# Patient Record
Sex: MALE | Race: BLACK OR AFRICAN AMERICAN | Employment: OTHER | RURAL
[De-identification: names, ages, dates, MRNs, and addresses within clinical notes are randomized per-mention and may not be internally consistent; named-entity substitution may affect disease eponyms.]

---

## 2017-01-04 ENCOUNTER — TELEPHONE (OUTPATIENT)
Dept: CARDIOLOGY CLINIC | Age: 52
End: 2017-01-04

## 2017-01-04 NOTE — TELEPHONE ENCOUNTER
Pt advised (after 2 identifiers) per Dr Cristina Alvarenga \" Needs to go to ER for evaluation\". Pt acknowledged understanding and will go to the ER for evaluation.

## 2017-01-04 NOTE — TELEPHONE ENCOUNTER
Pt called w/cx of \"falling, stumbling, dizziness\" x past 3-4 days. Pt also states lying in bed - everything \"spinning\". Pt has hx of previous clot and heart attack in Jan.    Pt requesting to be \"put in hospital and having testing done\" to find out what is causing these sx's as \"nothing was found to the cause of heart attack in Jan\". Pt requesting c/b.

## 2018-01-30 ENCOUNTER — DOCUMENTATION ONLY (OUTPATIENT)
Dept: CARDIOLOGY CLINIC | Age: 53
End: 2018-01-30

## 2018-01-30 NOTE — PROGRESS NOTES
Per verbal request ( and 2 identifiers) last office note and ekg faxed to [facility]ins.     Ph   [phone]  Fax  [phone]

## 2018-02-20 ENCOUNTER — HOSPITAL ENCOUNTER (OUTPATIENT)
Dept: ULTRASOUND IMAGING | Age: 53
Discharge: HOME | End: 2018-02-20
Attending: ORTHOPAEDIC SURGERY
Payer: MEDICAID

## 2018-02-20 DIAGNOSIS — R20.0 HAND NUMBNESS: ICD-10-CM

## 2018-02-20 PROCEDURE — 76882 US LMTD JT/FCL EVL NVASC XTR: CPT

## 2018-03-06 ENCOUNTER — HOSPITAL ENCOUNTER (OUTPATIENT)
Dept: MRI IMAGING | Age: 53
Discharge: HOME | End: 2018-03-06
Attending: ORTHOPAEDIC SURGERY
Payer: MEDICAID

## 2018-03-06 DIAGNOSIS — R20.0 HAND NUMBNESS: ICD-10-CM

## 2018-03-06 PROCEDURE — 73218 MRI UPPER EXTREMITY W/O DYE: CPT

## 2018-10-18 ENCOUNTER — DOCUMENTATION ONLY (OUTPATIENT)
Dept: CARDIOLOGY CLINIC | Age: 53
End: 2018-10-18

## 2018-10-18 NOTE — PROGRESS NOTES
Per fax request (2 identifiers) last ofc note and ekg faxed to Dr Anitha Gamboa, [facility] and [address], ATTN: Janette.          [phone]  Fax  [phone]

## 2020-03-25 ENCOUNTER — PATIENT OUTREACH (OUTPATIENT)
Dept: FAMILY MEDICINE CLINIC | Age: 55
End: 2020-03-25

## 2020-03-25 NOTE — PROGRESS NOTES
COVID-19 Screening Initial Follow-up Note    Patient contacted regarding COVID-19  risk. Care Transition Nurse/ Ambulatory Care Manager contacted the patient by telephone to perform post discharge assessment. Verified name and  with patient as identifiers. Provided introduction to self, and explanation of the CTN/ACM role, and reason for call due to risk factors for infection and/or exposure to COVID-19. Symptoms reviewed with patient who verbalized the following symptoms: cough, shortness of breath, no new/worsening symptoms       Due to no new or worsening symptoms encounter was not routed to provider for escalation. Patient has following risk factors of: COPD. CTN/ACM reviewed discharge instructions, medical action plan and red flags such as increased shortness of breath, increasing fever and signs of decompensation with patient who verbalized understanding. Discussed exposure protocols and quarantine with CDC Guidelines What to do if you are sick with coronavirus disease 2019 Patient who was given an opportunity for questions and concerns. The patient agrees to contact the [facility] exposure line [phone], [facility] department  [facility]  [phone] and PCP office for questions related to their healthcare. CTN/ACM provided contact information for future reference.     Reviewed and educated patient on any new and changed medications related to discharge diagnosis     Plan for follow-up call in 14 days based on severity of symptoms and risk factors

## 2020-04-08 ENCOUNTER — PATIENT OUTREACH (OUTPATIENT)
Dept: FAMILY MEDICINE CLINIC | Age: 55
End: 2020-04-08

## 2020-04-08 NOTE — PROGRESS NOTES
Patient resolved from Transition of Care episode on 04/08/20  Patient/family has been provided the following resources and education related to COVID-19:                         Signs, symptoms and red flags related to COVID-19            CDC exposure and quarantine guidelines            [facility] exposure contact - [phone]            Contact for their local Department of Health                 Patient currently reports that the following symptoms have improved:  shortness of breath, no new/worsening symptoms     No further outreach scheduled with this CTN/ACM. Episode of Care resolved. Patient has this CTN/ACM contact information if future needs arise.

## 2020-06-16 PROBLEM — M17.0 TRICOMPARTMENT OSTEOARTHRITIS OF BOTH KNEES: Status: ACTIVE | Noted: 2018-11-01

## 2020-06-16 PROBLEM — G89.28 CHRONIC PAIN FOLLOWING SURGERY OR PROCEDURE: Status: ACTIVE | Noted: 2017-03-11

## 2020-07-07 ENCOUNTER — PATIENT OUTREACH (OUTPATIENT)
Dept: FAMILY MEDICINE CLINIC | Age: 55
End: 2020-07-07

## 2020-07-07 NOTE — PROGRESS NOTES
Patient contacted regarding recent discharge and COVID-19 risk. Discussed COVID-19 related testing which was not done at this time. Test results were not done. Patient informed of results, if available? n/a    Care Transition Nurse/ Ambulatory Care Manager contacted the patient by telephone to perform post discharge assessment. Verified name and  with patient as identifiers. Patient has following risk factors of:HTN, Asthma, COPD . CTN/ACM reviewed discharge instructions, medical action plan and red flags related to discharge diagnosis. Reviewed and educated them on any new and changed medications related to discharge diagnosis. Advised obtaining a 90-day supply of all daily and as-needed medications. Education provided regarding infection prevention, and signs and symptoms of COVID-19 and when to seek medical attention with patient who verbalized understanding. Discussed exposure protocols and quarantine from [address] you at higher risk for severe illness  and given an opportunity for questions and concerns. The patient agrees to contact the COVID-19 hotline [phone] or PCP office for questions related to their healthcare. CTN/ACM provided contact information for future reference. From CDC: Are you at higher risk for severe illness?  Wash your hands often.  Avoid close contact (6 feet, which is about two arm lengths) with people who are sick.  Put distance between yourself and other people if COVID-19 is spreading in your community.  Clean and disinfect frequently touched surfaces.  Avoid all cruise travel and non-essential air travel.  Call your healthcare professional if you have concerns about COVID-19 and your underlying condition or if you are sick.     For more information on steps you can take to protect yourself, see CDC's How to Protect Yourself      Patient/family/caregiver given information for Ros[facility] Malave and agrees to enroll yes  Patient's preferred e-mail:  [email or web address]  Patient's preferred phone number: [phone]  Based on Loop alert triggers, patient will be contacted by nurse care manager for worsening symptoms. Pt will be further monitored by COVID Loop Team based on severity of symptoms and risk factors. Patient will call for follow up today.

## 2021-04-26 ENCOUNTER — HOSPITAL ENCOUNTER (EMERGENCY)
Age: 56
Discharge: HOME | End: 2021-04-26
Attending: EMERGENCY MEDICINE
Payer: MEDICAID

## 2021-04-26 ENCOUNTER — APPOINTMENT (OUTPATIENT)
Dept: CT IMAGING | Age: 56
End: 2021-04-26
Attending: EMERGENCY MEDICINE
Payer: MEDICAID

## 2021-04-26 VITALS
TEMPERATURE: 98.3 F | BODY MASS INDEX: 24 KG/M2 | SYSTOLIC BLOOD PRESSURE: 138 MMHG | HEIGHT: 74 IN | RESPIRATION RATE: 16 BRPM | DIASTOLIC BLOOD PRESSURE: 86 MMHG | HEART RATE: 62 BPM | WEIGHT: 187 LBS | OXYGEN SATURATION: 95 %

## 2021-04-26 DIAGNOSIS — R10.31 ABDOMINAL PAIN, RIGHT LOWER QUADRANT: ICD-10-CM

## 2021-04-26 DIAGNOSIS — K59.00 CONSTIPATION, UNSPECIFIED CONSTIPATION TYPE: Primary | ICD-10-CM

## 2021-04-26 LAB
ALBUMIN SERPL-MCNC: 3.4 G/DL (ref 3.5–5)
ALBUMIN/GLOB SERPL: 1.1 {RATIO} (ref 1.1–2.2)
ALP SERPL-CCNC: 60 U/L (ref 45–117)
ALT SERPL-CCNC: 20 U/L (ref 12–78)
ANION GAP SERPL CALC-SCNC: 10 MMOL/L (ref 5–15)
AST SERPL-CCNC: 13 U/L (ref 15–37)
BASOPHILS # BLD: 0 K/UL (ref 0–0.1)
BASOPHILS NFR BLD: 1 % (ref 0–1)
BILIRUB SERPL-MCNC: 0.3 MG/DL (ref 0.2–1)
BUN SERPL-MCNC: 12 MG/DL (ref 6–20)
BUN/CREAT SERPL: 8 (ref 12–20)
CALCIUM SERPL-MCNC: 8.4 MG/DL (ref 8.5–10.1)
CHLORIDE SERPL-SCNC: 104 MMOL/L (ref 97–108)
CO2 SERPL-SCNC: 27 MMOL/L (ref 21–32)
COMMENT, HOLDF: NORMAL
CREAT SERPL-MCNC: 1.46 MG/DL (ref 0.7–1.3)
DIFFERENTIAL METHOD BLD: NORMAL
EOSINOPHIL # BLD: 0.2 K/UL (ref 0–0.4)
EOSINOPHIL NFR BLD: 3 % (ref 0–7)
ERYTHROCYTE [DISTWIDTH] IN BLOOD BY AUTOMATED COUNT: 11.8 % (ref 11.5–14.5)
GLOBULIN SER CALC-MCNC: 3 G/DL (ref 2–4)
GLUCOSE SERPL-MCNC: 95 MG/DL (ref 65–100)
HCT VFR BLD AUTO: 41.4 % (ref 36.6–50.3)
HGB BLD-MCNC: 14 G/DL (ref 12.1–17)
IMM GRANULOCYTES # BLD AUTO: 0 K/UL (ref 0–0.04)
IMM GRANULOCYTES NFR BLD AUTO: 0 % (ref 0–0.5)
INR PPP: 1 (ref 0.9–1.1)
LIPASE SERPL-CCNC: 137 U/L (ref 73–393)
LYMPHOCYTES # BLD: 2.1 K/UL (ref 0.8–3.5)
LYMPHOCYTES NFR BLD: 42 % (ref 12–49)
MAGNESIUM SERPL-MCNC: 1.8 MG/DL (ref 1.6–2.4)
MCH RBC QN AUTO: 32 PG (ref 26–34)
MCHC RBC AUTO-ENTMCNC: 33.8 G/DL (ref 30–36.5)
MCV RBC AUTO: 94.7 FL (ref 80–99)
MONOCYTES # BLD: 0.6 K/UL (ref 0–1)
MONOCYTES NFR BLD: 11 % (ref 5–13)
NEUTS SEG # BLD: 2.2 K/UL (ref 1.8–8)
NEUTS SEG NFR BLD: 43 % (ref 32–75)
NRBC # BLD: 0 K/UL (ref 0–0.01)
NRBC BLD-RTO: 0 PER 100 WBC
PLATELET # BLD AUTO: 220 K/UL (ref 150–400)
PMV BLD AUTO: 9.5 FL (ref 8.9–12.9)
POTASSIUM SERPL-SCNC: 4 MMOL/L (ref 3.5–5.1)
PROT SERPL-MCNC: 6.4 G/DL (ref 6.4–8.2)
PROTHROMBIN TIME: 10.2 SEC (ref 9–11.1)
RBC # BLD AUTO: 4.37 M/UL (ref 4.1–5.7)
SAMPLES BEING HELD,HOLD: NORMAL
SODIUM SERPL-SCNC: 141 MMOL/L (ref 136–145)
WBC # BLD AUTO: 5.1 K/UL (ref 4.1–11.1)

## 2021-04-26 PROCEDURE — 96376 TX/PRO/DX INJ SAME DRUG ADON: CPT

## 2021-04-26 PROCEDURE — 96374 THER/PROPH/DIAG INJ IV PUSH: CPT

## 2021-04-26 PROCEDURE — 96375 TX/PRO/DX INJ NEW DRUG ADDON: CPT

## 2021-04-26 PROCEDURE — 74011250637 HC RX REV CODE- 250/637: Performed by: EMERGENCY MEDICINE

## 2021-04-26 PROCEDURE — 74177 CT ABD & PELVIS W/CONTRAST: CPT

## 2021-04-26 PROCEDURE — 85025 COMPLETE CBC W/AUTO DIFF WBC: CPT

## 2021-04-26 PROCEDURE — 36415 COLL VENOUS BLD VENIPUNCTURE: CPT

## 2021-04-26 PROCEDURE — 83735 ASSAY OF MAGNESIUM: CPT

## 2021-04-26 PROCEDURE — 74011000636 HC RX REV CODE- 636: Performed by: EMERGENCY MEDICINE

## 2021-04-26 PROCEDURE — 83690 ASSAY OF LIPASE: CPT

## 2021-04-26 PROCEDURE — 85610 PROTHROMBIN TIME: CPT

## 2021-04-26 PROCEDURE — 99284 EMERGENCY DEPT VISIT MOD MDM: CPT

## 2021-04-26 PROCEDURE — 74011250636 HC RX REV CODE- 250/636: Performed by: EMERGENCY MEDICINE

## 2021-04-26 PROCEDURE — 80053 COMPREHEN METABOLIC PANEL: CPT

## 2021-04-26 RX ORDER — DICYCLOMINE HYDROCHLORIDE 20 MG/1
20 TABLET ORAL
Status: COMPLETED | OUTPATIENT
Start: 2021-04-26 | End: 2021-04-26

## 2021-04-26 RX ORDER — ONDANSETRON 4 MG/1
4 TABLET, ORALLY DISINTEGRATING ORAL
Qty: 6 TAB | Refills: 0 | Status: SHIPPED | OUTPATIENT
Start: 2021-04-26 | End: 2022-03-09

## 2021-04-26 RX ORDER — MORPHINE SULFATE 4 MG/ML
8 INJECTION INTRAVENOUS ONCE
Status: COMPLETED | OUTPATIENT
Start: 2021-04-26 | End: 2021-04-26

## 2021-04-26 RX ORDER — DICYCLOMINE HYDROCHLORIDE 10 MG/1
10 CAPSULE ORAL
Qty: 12 CAP | Refills: 0 | Status: SHIPPED | OUTPATIENT
Start: 2021-04-26 | End: 2022-01-28

## 2021-04-26 RX ORDER — SODIUM CHLORIDE 0.9 % (FLUSH) 0.9 %
5-10 SYRINGE (ML) INJECTION ONCE
Status: DISCONTINUED | OUTPATIENT
Start: 2021-04-26 | End: 2021-04-26 | Stop reason: HOSPADM

## 2021-04-26 RX ORDER — ONDANSETRON 2 MG/ML
8 INJECTION INTRAMUSCULAR; INTRAVENOUS ONCE
Status: COMPLETED | OUTPATIENT
Start: 2021-04-26 | End: 2021-04-26

## 2021-04-26 RX ORDER — POLYETHYLENE GLYCOL 3350 17 G/17G
17 POWDER, FOR SOLUTION ORAL DAILY
Qty: 235 G | Refills: 0 | Status: SHIPPED | OUTPATIENT
Start: 2021-04-26 | End: 2022-03-09

## 2021-04-26 RX ADMIN — MORPHINE SULFATE 8 MG: 4 INJECTION INTRAVENOUS at 16:11

## 2021-04-26 RX ADMIN — ONDANSETRON 8 MG: 2 INJECTION INTRAMUSCULAR; INTRAVENOUS at 14:40

## 2021-04-26 RX ADMIN — DICYCLOMINE HYDROCHLORIDE 20 MG: 20 TABLET ORAL at 16:12

## 2021-04-26 RX ADMIN — IOPAMIDOL 100 ML: 612 INJECTION, SOLUTION INTRAVENOUS at 15:05

## 2021-04-26 RX ADMIN — SODIUM CHLORIDE 1000 ML: 9 INJECTION, SOLUTION INTRAVENOUS at 14:35

## 2021-04-26 RX ADMIN — MORPHINE SULFATE 8 MG: 4 INJECTION INTRAVENOUS at 14:40

## 2021-04-26 NOTE — ED PROVIDER NOTES
EMERGENCY DEPARTMENT HISTORY AND PHYSICAL EXAM          Date: 4/26/2021  Patient Name: Dariana Grant    History of Presenting Illness     Chief Complaint   Patient presents with    Abdominal Pain       History Provided By: Patient    HPI: Dariana Grant is a 60 y.o. male, pmhx hypertension, COPD, CAD status post MI with stents, PTSD, chronic pain, who presents dilatory to the ED c/o abdominal pain    Patient explains that recently has been having pain for about a few months which is currently being evaluated by GI and colorectal specialists for diagnosis. He was diagnosed with diverticulitis in December. He states over the past few days his symptoms of worsening and his girlfriend decided to bring him to the ER today. Patient explains he has diffuse pain which seems worse on the right side and he notes he has been having bloody stools. He states the stools are dark in color with some bright blood on it and sometimes blood in the toilet with clots. He denies any fevers, chills, shortness of breath, vomiting as well as any chest pain with the symptoms. Pain is currently listed at a 9/10. PCP: Tricia White MD    Allergies: Penicillin  Social Hx: +tobacco, +EtOH, -Illicit Drugs; There are no other complaints, changes, or physical findings at this time. Current Outpatient Medications   Medication Sig Dispense Refill    dicyclomine (BENTYL) 10 mg capsule Take 1 Cap by mouth four (4) times daily as needed for Abdominal Cramps. 12 Cap 0    ondansetron (ZOFRAN ODT) 4 mg disintegrating tablet Take 1 Tab by mouth every eight (8) hours as needed for Nausea. 6 Tab 0    polyethylene glycol (Miralax) 17 gram/dose powder Take 17 g by mouth daily. 1 tablespoon with 8 oz of water daily 235 g 0    cyclobenzaprine (FLEXERIL) 10 mg tablet Take 1 Tab by mouth three (3) times daily as needed for Muscle Spasm(s).  21 Tab 0    predniSONE (DELTASONE) 10 mg tablet 5 tab p.o. daily x2 days then 4 tabs daily p.o. x2 days, then 3 tabs daily p.o. x2 days then 2 tabs daily p.o. x2 days then 1 tab daily for 3 days  Indications: worsening asthma 30 Tab 0    ipratropium (ATROVENT HFA) 17 mcg/actuation inhaler Take 1 Puff by inhalation three (3) times daily. 1 Inhaler 5    ipratropium (ATROVENT) 0.02 % soln 2.5 mL by Nebulization route every four (4) hours as needed. 30 Vial 0    benzonatate (TESSALON PERLES) 100 mg capsule Take 100 mg by mouth three (3) times daily as needed for Cough.  lansoprazole (PREVACID PO) Take 1 Each by mouth daily.  carvedilol (COREG) 6.25 mg tablet Take 1 Tab by mouth two (2) times daily (with meals). 60 Tab 0    clopidogrel (PLAVIX) 75 mg tab Take 1 Tab by mouth daily. 30 Tab 0    isosorbide mononitrate ER (IMDUR) 30 mg tablet Take 1 Tab by mouth daily. 30 Tab 0    budesonide-formoterol (SYMBICORT) 160-4.5 mcg/actuation HFA inhaler Take 2 Puffs by inhalation two (2) times a day. 1 Inhaler 5    albuterol (PROVENTIL HFA, VENTOLIN HFA, PROAIR HFA) 90 mcg/actuation inhaler Take 1 Puff by inhalation every six (6) hours as needed for Wheezing. 1 Inhaler 5    albuterol (PROVENTIL VENTOLIN) 2.5 mg /3 mL (0.083 %) nebulizer solution 3 mL by Nebulization route as needed. 2 Package 0    aspirin delayed-release 81 mg tablet Take 1 Tab by mouth daily. 30 Tab 0    atorvastatin (LIPITOR) 80 mg tablet Take 1 Tab by mouth daily. 30 Tab 0    lisinopril (PRINIVIL, ZESTRIL) 20 mg tablet Take 1 Tab by mouth daily. 30 Tab 0    lidocaine (LIDODERM) 5 % 3 Patches by TransDERmal route every twelve (12) hours. 90 Patch 0    traZODone (DESYREL) 150 mg tablet Take 2 Tabs by mouth nightly. 60 Tab 3    nitroglycerin (NITROSTAT) 0.4 mg SL tablet 1 Tab by SubLINGual route every five (5) minutes as needed for Chest Pain.  1 Bottle 2    Blood Pressure Monitor kit Use as directed 1 Kit 0       Past History     Past Medical History:  Past Medical History:   Diagnosis Date    Acute MI, anterior wall ([address] 1/9/16    VF arrest/lytics/[facility]    Asthma     Chronic obstructive pulmonary disease (HCC)     Chronic pain     Coronary heart disease     GERD (gastroesophageal reflux disease)     Hypertension     Pneumonia     PTSD (post-traumatic stress disorder)     Shingles        Past Surgical History:  Past Surgical History:   Procedure Laterality Date    HX KNEE ARTHROSCOPY Right     HX ORTHOPAEDIC      removed a mass from right palm oct 13 2017right wrist fxr repair 2016 dec    HX ROTATOR CUFF REPAIR         Family History:  Family History   Family history unknown: Yes       Social History:  Social History     Tobacco Use    Smoking status: Current Every Day Smoker     Packs/day: 0.10     Types: Cigarettes    Smokeless tobacco: Never Used   Substance Use Topics    Alcohol use: Yes     Alcohol/week: 1.0 standard drinks     Types: 1 Shots of liquor per week     Comment: social    Drug use: No       Allergies: Allergies   Allergen Reactions    Pcn [Penicillins] Unknown (comments)         Review of Systems   Review of Systems   Constitutional: Negative for activity change, appetite change, chills, fever and unexpected weight change. HENT: Negative for congestion. Eyes: Negative for pain and visual disturbance. Respiratory: Negative for cough and shortness of breath. Cardiovascular: Negative for chest pain. Gastrointestinal: Positive for abdominal pain, anal bleeding and blood in stool. Negative for diarrhea, nausea and vomiting. Genitourinary: Negative for dysuria. Musculoskeletal: Negative for back pain. Skin: Negative for rash. Neurological: Negative for headaches. Physical Exam   Physical Exam  Vitals signs and nursing note reviewed. Constitutional:       Appearance: He is well-developed. He is not diaphoretic. Comments: This is a thin middle-aged male who appears uncomfortable currently in moderate distress   HENT:      Head: Normocephalic and atraumatic.    Eyes: General:         Right eye: No discharge. Left eye: No discharge. Conjunctiva/sclera: Conjunctivae normal.      Pupils: Pupils are equal, round, and reactive to light. Neck:      Musculoskeletal: Normal range of motion and neck supple. Cardiovascular:      Rate and Rhythm: Normal rate and regular rhythm. Heart sounds: Normal heart sounds. No murmur. Pulmonary:      Effort: Pulmonary effort is normal. No respiratory distress. Breath sounds: Normal breath sounds. No wheezing or rales. Abdominal:      General: Abdomen is flat. Bowel sounds are normal. There is no distension. Palpations: Abdomen is soft. There is no fluid wave or hepatomegaly. Tenderness: There is abdominal tenderness in the right lower quadrant. There is guarding. Musculoskeletal: Normal range of motion. Skin:     General: Skin is warm and dry. Capillary Refill: Capillary refill takes less than 2 seconds. Findings: No rash. Neurological:      Mental Status: He is alert and oriented to person, place, and time. Cranial Nerves: No cranial nerve deficit. Motor: No abnormal muscle tone. Diagnostic Study Results     Labs -   No results found for this or any previous visit (from the past 12 hour(s)). Radiologic Studies -   CT ABD PELV W CONT   Final Result   1. Evidence of fatty infiltration involving the liver. 2. Relative enlargement of the prostate gland. CT Results  (Last 48 hours)               04/26/21 1507  CT ABD PELV W CONT Final result    Impression:  1. Evidence of fatty infiltration involving the liver. 2. Relative enlargement of the prostate gland. Narrative:  EXAM: CT ABD PELV W CONT       INDICATION: RLQ pain       COMPARISON: CT abdomen and pelvis dated 9/9/2018        CONTRAST: 100 mL of Isovue-300.        TECHNIQUE:    Following the uneventful intravenous administration of contrast, thin axial   images were obtained through the abdomen and pelvis. Coronal and sagittal   reconstructions were generated. Oral contrast was not administered. CT dose   reduction was achieved through use of a standardized protocol tailored for this   examination and automatic exposure control for dose modulation. FINDINGS:    Lung bases: Clear. Incidentally imaged heart and mediastinum: Unremarkable. Liver: Decreased attenuation compatible with fatty infiltration. No focal   hepatic lesions identified. No mass or biliary dilatation. Gallbladder: Unremarkable. Spleen: Within normal limits. Pancreas: No mass or ductal dilatation. Adrenals: Unremarkable. Kidneys: No mass or hydronephrosis. Stomach: Unremarkable. Small bowel: No dilatation or wall thickening. Colon: No dilatation or wall thickening. A moderately large amount of gas and   fecal material is noted within the colon. There is no evidence of intestinal   obstruction or free intraperitoneal air. Appendix: Normal in appearance (see axial image 68). Peritoneum: No ascites or pneumoperitoneum. Retroperitoneum: No lymphadenopathy or aortic aneurysm. Reproductive organs: Relative enlargement of the prostate gland. Urinary bladder: Suboptimal distention of the urinary bladder. Bones: No destructive bone lesion. Additional comments: N/A               CXR Results  (Last 48 hours)    None            Medical Decision Making   I am the first provider for this patient. I reviewed the vital signs, available nursing notes, past medical history, past surgical history, family history and social history. Vital Signs-Reviewed the patient's vital signs.   Patient Vitals for the past 24 hrs:   Temp Pulse Resp BP SpO2   04/26/21 1634  62 16 138/86 95 %   04/26/21 1605  62 18 (!) 137/92 98 %   04/26/21 1522  60 18 137/77 99 %   04/26/21 1437  71 20 110/77 99 %   04/26/21 1356 98.3 °F (36.8 °C) 76 18 118/87 95 %     Pulse Oximetry Analysis - 95% on RA    Records Reviewed: Nursing Notes, Old Medical Records, Previous Radiology Studies and Previous Laboratory Studies    Provider Notes (Medical Decision Making):   MDM: Middle-aged male presenting with known history of diverticulitis for rectal bleeding and abdominal pain. Exam concerning for guarding with right lower quadrant tenderness. However, his vitals are normal without any concern for sepsis or bacteremia. Will initiate symptom management with broad evaluation to rule out abdominal pathology. ED Course:   Initial assessment performed. The patients presenting problems have been discussed, and they are in agreement with the care plan formulated and outlined with them. I have encouraged them to ask questions as they arise throughout their visit. PROGRESS NOTE:  3:30 PM  Pt continues with pain. Second dose of medication to be provided. Labs reviewed and notable for appearing at baseline without any evidence of white blood cell elevation or increase in creatinine. Electrolytes appear normal.  Await CT scan results. 4 PM  Patient continues with pain but has yet to receive medication. Bentyl ordered on attempt to prevent cramping given the amount of constipation. Discussed with patient the importance of bowel regimen with him while awaiting for his colorectal appointment. Return precautions reviewed for GI bleeding/anemia or worsening abdominal pain. Discharge note:  Pt re-evaluated and noted to be feeling better, ready for discharge. Updated pt on all final lab and CT findings. Will follow up as instructed. All questions have been answered, pt voiced understanding and agreement with plan. Specific return precautions provided as well as instructions to return to the ED should sx worsen at any time. Vital signs stable for discharge. Critical Care Time:   0      Diagnosis     Clinical Impression:   1. Constipation, unspecified constipation type    2. Abdominal pain, right lower quadrant        PLAN:  1.    Discharge Medication List as of 4/26/2021  4:33 PM      START taking these medications    Details   dicyclomine (BENTYL) 10 mg capsule Take 1 Cap by mouth four (4) times daily as needed for Abdominal Cramps., Normal, Disp-12 Cap, R-0      ondansetron (ZOFRAN ODT) 4 mg disintegrating tablet Take 1 Tab by mouth every eight (8) hours as needed for Nausea., Normal, Disp-6 Tab, R-0      polyethylene glycol (Miralax) 17 gram/dose powder Take 17 g by mouth daily. 1 tablespoon with 8 oz of water daily, Normal, Disp-235 g, R-0         CONTINUE these medications which have NOT CHANGED    Details   cyclobenzaprine (FLEXERIL) 10 mg tablet Take 1 Tab by mouth three (3) times daily as needed for Muscle Spasm(s). , Normal, Disp-21 Tab, R-0      predniSONE (DELTASONE) 10 mg tablet 5 tab p.o. daily x2 days then 4 tabs daily p.o. x2 days, then 3 tabs daily p.o. x2 days then 2 tabs daily p.o. x2 days then 1 tab daily for 3 days  Indications: worsening asthma, Print, Disp-30 Tab, R-0      ipratropium (ATROVENT HFA) 17 mcg/actuation inhaler Take 1 Puff by inhalation three (3) times daily. , Normal, Disp-1 Inhaler, R-5      ipratropium (ATROVENT) 0.02 % soln 2.5 mL by Nebulization route every four (4) hours as needed. , Print, Disp-30 Vial, R-0      benzonatate (TESSALON PERLES) 100 mg capsule Take 100 mg by mouth three (3) times daily as needed for Cough., Historical Med      lansoprazole (PREVACID PO) Take 1 Each by mouth daily. , Historical Med      carvedilol (COREG) 6.25 mg tablet Take 1 Tab by mouth two (2) times daily (with meals). , Normal, Disp-60 Tab, R-0      clopidogrel (PLAVIX) 75 mg tab Take 1 Tab by mouth daily. , Normal, Disp-30 Tab, R-0      isosorbide mononitrate ER (IMDUR) 30 mg tablet Take 1 Tab by mouth daily. , Normal, Disp-30 Tab, R-0      budesonide-formoterol (SYMBICORT) 160-4.5 mcg/actuation HFA inhaler Take 2 Puffs by inhalation two (2) times a day., Normal, Disp-1 Inhaler, R-5      albuterol (PROVENTIL HFA, VENTOLIN HFA, PROAIR HFA) 90 mcg/actuation inhaler Take 1 Puff by inhalation every six (6) hours as needed for Wheezing., Normal, Disp-1 Inhaler, R-5      albuterol (PROVENTIL VENTOLIN) 2.5 mg /3 mL (0.083 %) nebulizer solution 3 mL by Nebulization route as needed., Normal, Disp-2 Package, R-0      aspirin delayed-release 81 mg tablet Take 1 Tab by mouth daily. , Normal, Disp-30 Tab, R-0      atorvastatin (LIPITOR) 80 mg tablet Take 1 Tab by mouth daily. , Normal, Disp-30 Tab, R-0      lisinopril (PRINIVIL, ZESTRIL) 20 mg tablet Take 1 Tab by mouth daily. , Normal, Disp-30 Tab, R-0      lidocaine (LIDODERM) 5 % 3 Patches by TransDERmal route every twelve (12) hours. , Normal, Disp-90 Patch, R-0      traZODone (DESYREL) 150 mg tablet Take 2 Tabs by mouth nightly., Normal, Disp-60 Tab, R-3      nitroglycerin (NITROSTAT) 0.4 mg SL tablet 1 Tab by SubLINGual route every five (5) minutes as needed for Chest Pain., Normal, Disp-1 Bottle, R-2      Blood Pressure Monitor kit Use as directed, Normal, Disp-1 Kit, R-0           2. Follow-up Information     Follow up With Specialties Details Why Contact Info    Mariano Castro MD Family Medicine Schedule an appointment as soon as possible for a visit   [facility] [address]  [phone]      [address] [facility]  If symptoms worsen [address]  [address]        Return to ED if worse     Disposition:  Home       Please note, this dictation was completed with [employer], the [employer] voice recognition software. Quite often unanticipated grammatical, syntax, homophones, and other interpretive errors are inadvertently transcribed by the computer software. Please disregard these errors. Please excuse any errors that have escaped final proof reading.

## 2021-04-26 NOTE — ED NOTES
Discharge instructions are reviewed with patient who denies questions or concerns. Leaves amb to care of girlfriend.

## 2021-06-16 ENCOUNTER — OFFICE VISIT (OUTPATIENT)
Dept: CARDIOLOGY CLINIC | Age: 56
End: 2021-06-16
Payer: MEDICAID

## 2021-06-16 VITALS
SYSTOLIC BLOOD PRESSURE: 110 MMHG | WEIGHT: 189 LBS | TEMPERATURE: 98.3 F | BODY MASS INDEX: 24.26 KG/M2 | HEART RATE: 80 BPM | RESPIRATION RATE: 16 BRPM | HEIGHT: 74 IN | OXYGEN SATURATION: 98 % | DIASTOLIC BLOOD PRESSURE: 80 MMHG

## 2021-06-16 DIAGNOSIS — J41.0 SIMPLE CHRONIC BRONCHITIS (HCC): ICD-10-CM

## 2021-06-16 DIAGNOSIS — I25.118 CORONARY ARTERY DISEASE INVOLVING NATIVE CORONARY ARTERY OF NATIVE HEART WITH OTHER FORM OF ANGINA PECTORIS (HCC): Primary | ICD-10-CM

## 2021-06-16 DIAGNOSIS — I25.83 CORONARY ARTERY DISEASE DUE TO LIPID RICH PLAQUE: ICD-10-CM

## 2021-06-16 DIAGNOSIS — G89.28 CHRONIC PAIN FOLLOWING SURGERY OR PROCEDURE: ICD-10-CM

## 2021-06-16 DIAGNOSIS — E78.5 DYSLIPIDEMIA: ICD-10-CM

## 2021-06-16 DIAGNOSIS — I25.10 CORONARY ARTERY DISEASE DUE TO LIPID RICH PLAQUE: ICD-10-CM

## 2021-06-16 DIAGNOSIS — I10 ESSENTIAL HYPERTENSION: ICD-10-CM

## 2021-06-16 DIAGNOSIS — J45.20 MILD INTERMITTENT ASTHMA WITHOUT COMPLICATION: ICD-10-CM

## 2021-06-16 DIAGNOSIS — K21.9 GASTROESOPHAGEAL REFLUX DISEASE WITHOUT ESOPHAGITIS: ICD-10-CM

## 2021-06-16 PROCEDURE — 99204 OFFICE O/P NEW MOD 45 MIN: CPT | Performed by: INTERNAL MEDICINE

## 2021-06-16 PROCEDURE — 93000 ELECTROCARDIOGRAM COMPLETE: CPT | Performed by: INTERNAL MEDICINE

## 2021-06-16 RX ORDER — ASPIRIN 81 MG/1
81 TABLET ORAL DAILY
Qty: 90 TABLET | Refills: 1
Start: 2021-06-16 | End: 2022-02-11

## 2021-06-16 NOTE — PROGRESS NOTES
Ousmane Tovar is a 60 y.o. male is here for cardiac evaluation, last seen by me 11/16. S/p anterior wall STEMI 1/9/16, VF arrest [facility] ER, lytics/medflight to [facility], cath but no PCI (apparently non-obstructive CAD).   Had LifeVest and on meds as listed--ASA, plavix, high dose statin, ACEI, diltiazem (UDS + cocaine)--changed to Coreg at last visit.  Echo 2/5/16--mild LVH, LVEF 55-60, D1, trace MRDanuta  Multiple co-morbidities--hypertension, dyslipidemia, GERD, chronic back pain, COPD/asthma, tobacco, pneumonia. Stress MPI 6/28/16--no ischemia, fixed inf wall defect, LVEF 58%. , hypetension, dyslipidemia, asthma/COPD, DJD, cervical radiculopathy, chronic pain, GERD, tubular adenoma, diveriticulosis. Mild HOLLY, occ chest tightness. No recent cardiac testing. Continues to see GI. The patient denies orthopnea, PND, LE edema, palpitations, syncope, presyncope or fatigue.        Patient Active Problem List    Diagnosis Date Noted    Tubular adenoma     Cervical radiculopathy     Diverticulosis     Tricompartment osteoarthritis of both knees 11/01/2018    Chronic pain following surgery or procedure 03/11/2017    Closed head injury 08/09/2016    CAD (coronary artery disease) 02/04/2016    MI (myocardial infarction) ([address] 02/04/2016    Essential hypertension 02/04/2016    COPD (chronic obstructive pulmonary disease) ([address] 02/04/2016    Dyslipidemia 02/04/2016    GERD (gastroesophageal reflux disease) 02/04/2016    Asthma 02/23/2010      Jeremiah Blank MD  Past Medical History:   Diagnosis Date    Acute MI, anterior wall ([address] 01/09/2016    VF arrest/lytics/medflight [facility]/cath/no PCI    Asthma     Cervical radiculopathy     Chronic obstructive pulmonary disease (HCC)     Chronic pain     Coronary heart disease     Diverticulosis     GERD (gastroesophageal reflux disease)     Hypertension     Pneumonia     PTSD (post-traumatic stress disorder)     Shingles     Tubular adenoma       Past Surgical History:   Procedure Laterality Date    HX KNEE ARTHROSCOPY Right     HX ORTHOPAEDIC      removed a mass from right palm oct 13 2017right wrist fxr repair 2016 dec    HX ROTATOR CUFF REPAIR       Allergies   Allergen Reactions    Pcn [Penicillins] Unknown (comments)      Family History   Family history unknown: Yes      Social History     Socioeconomic History    Marital status: SINGLE     Spouse name: Not on file    Number of children: 10    Years of education: Not on file    Highest education level: Not on file   Occupational History    Occupation: disability   Tobacco Use    Smoking status: Current Every Day Smoker     Packs/day: 0.25     Types: Cigarettes    Smokeless tobacco: Never Used    Tobacco comment: 4-5 ciggs per day. Substance and Sexual Activity    Alcohol use: Yes     Alcohol/week: 1.0 standard drinks     Types: 1 Shots of liquor per week     Comment: social    Drug use: No    Sexual activity: Not Currently     Partners: Female   Other Topics Concern    Not on file   Social History Narrative    Not on file     Social Determinants of Health     Financial Resource Strain:     Difficulty of Paying Living Expenses:    Food Insecurity:     Worried About Running Out of Food in the Last Year:     [address] in the Last Year:    Transportation Needs:     Lack of Transportation (Medical):      Lack of Transportation (Non-Medical):    Physical Activity:     Days of Exercise per Week:     Minutes of Exercise per Session:    Stress:     Feeling of Stress :    Social Connections:     Frequency of Communication with Friends and Family:     Frequency of Social Gatherings with Friends and Family:     Attends Voodoo Services:     Active Member of Clubs or Organizations:     Attends Club or Organization Meetings:     Marital Status:    Intimate Partner Violence:     Fear of Current or Ex-Partner:     Emotionally Abused:     Physically Abused:     Sexually Abused: Current Outpatient Medications   Medication Sig    dicyclomine (BENTYL) 10 mg capsule Take 1 Cap by mouth four (4) times daily as needed for Abdominal Cramps.  ondansetron (ZOFRAN ODT) 4 mg disintegrating tablet Take 1 Tab by mouth every eight (8) hours as needed for Nausea.  polyethylene glycol (Miralax) 17 gram/dose powder Take 17 g by mouth daily. 1 tablespoon with 8 oz of water daily    cyclobenzaprine (FLEXERIL) 10 mg tablet Take 1 Tab by mouth three (3) times daily as needed for Muscle Spasm(s).  ipratropium (ATROVENT HFA) 17 mcg/actuation inhaler Take 1 Puff by inhalation three (3) times daily.  ipratropium (ATROVENT) 0.02 % soln 2.5 mL by Nebulization route every four (4) hours as needed.  benzonatate (TESSALON PERLES) 100 mg capsule Take 100 mg by mouth three (3) times daily as needed for Cough.  lansoprazole (PREVACID PO) Take 1 Each by mouth daily.  carvedilol (COREG) 6.25 mg tablet Take 1 Tab by mouth two (2) times daily (with meals).  isosorbide mononitrate ER (IMDUR) 30 mg tablet Take 1 Tab by mouth daily.  budesonide-formoterol (SYMBICORT) 160-4.5 mcg/actuation HFA inhaler Take 2 Puffs by inhalation two (2) times a day.  albuterol (PROVENTIL HFA, VENTOLIN HFA, PROAIR HFA) 90 mcg/actuation inhaler Take 1 Puff by inhalation every six (6) hours as needed for Wheezing.  albuterol (PROVENTIL VENTOLIN) 2.5 mg /3 mL (0.083 %) nebulizer solution 3 mL by Nebulization route as needed.  atorvastatin (LIPITOR) 80 mg tablet Take 1 Tab by mouth daily.  lisinopril (PRINIVIL, ZESTRIL) 20 mg tablet Take 1 Tab by mouth daily.  traZODone (DESYREL) 150 mg tablet Take 2 Tabs by mouth nightly.  nitroglycerin (NITROSTAT) 0.4 mg SL tablet 1 Tab by SubLINGual route every five (5) minutes as needed for Chest Pain.  Blood Pressure Monitor kit Use as directed    aspirin delayed-release 81 mg tablet Take 1 Tab by mouth daily.  (Patient not taking: Reported on 6/16/2021)     No current facility-administered medications for this visit. Review of Symptoms:    CONST  No weight change. No fever, chills, sweats    ENT No visual changes, URI sx, sore throat    CV  See HPI   RESP  No cough, or sputum, wheezing. Also see HPI   GI  No abdominal pain or change in bowel habits. No heartburn or dysphagia. No melena or rectal bleeding.   No dysuria, urgency, frequency, hematuria   MSKEL  No joint pain, swelling. No muscle pain. SKIN  No rash or lesions. NEURO  No headache, syncope, or seizure. No weakness, loss of sensation, or paresthesias. PSYCH  No low mood or depression  No anxiety. HE/LYMPH  No easy bruising, abnormal bleeding, or enlarged glands. Physical ExamPhysical Exam:    Visit Vitals  /80 (BP 1 Location: Left upper arm, BP Patient Position: Sitting, BP Cuff Size: Adult)   Pulse 80   Temp 98.3 °F (36.8 °C) (Temporal)   Resp 16   Ht 6' 2\" (1.88 m)   Wt 189 lb (85.7 kg)   SpO2 98% Comment: ra   BMI 24.27 kg/m²     Gen: NAD  HEENT:  PERRL, throat clear  Neck: no adenopathy, no thyromegaly, no JVD   Heart:  Regular,Nl S1S2,  no murmur, gallop or rub. Lungs:  clear  Abdomen:   Soft, non-tender, bowel sounds are active.    Extremities:  No edema  Pulse: symmetric  Neuro: A&O times 3, No focal neuro deficits    Cardiographics    ECG: normal EKG, normal sinus rhythm, unchanged from previous tracings      Labs:   Lab Results   Component Value Date/Time    Sodium 141 04/26/2021 02:24 PM    Sodium 139 07/06/2020 12:05 AM    Sodium 139 10/18/2019 03:49 PM    Sodium 140 09/09/2018 09:15 PM    Sodium 136 09/01/2018 12:30 PM    Potassium 4.0 04/26/2021 02:24 PM    Potassium 3.8 07/06/2020 12:05 AM    Potassium 4.1 10/18/2019 03:49 PM    Potassium 4.0 09/09/2018 09:15 PM    Potassium 3.5 09/01/2018 12:30 PM    Chloride 104 04/26/2021 02:24 PM    Chloride 104 07/06/2020 12:05 AM    Chloride 102 10/18/2019 03:49 PM    Chloride 104 09/09/2018 09:15 PM    Chloride 101 09/01/2018 12:30 PM    CO2 27 04/26/2021 02:24 PM    CO2 27 07/06/2020 12:05 AM    CO2 26 10/18/2019 03:49 PM    CO2 29 09/09/2018 09:15 PM    CO2 25 09/01/2018 12:30 PM    Anion gap 10 04/26/2021 02:24 PM    Anion gap 8 07/06/2020 12:05 AM    Anion gap 11 10/18/2019 03:49 PM    Anion gap 7 09/09/2018 09:15 PM    Anion gap 10 09/01/2018 12:30 PM    Glucose 95 04/26/2021 02:24 PM    Glucose 89 07/06/2020 12:05 AM    Glucose 85 10/18/2019 03:49 PM    Glucose 91 09/09/2018 09:15 PM    Glucose 115 (H) 09/01/2018 12:30 PM    BUN 12 04/26/2021 02:24 PM    BUN 13 07/06/2020 12:05 AM    BUN 11 10/18/2019 03:49 PM    BUN 9 09/09/2018 09:15 PM    BUN 11 09/01/2018 12:30 PM    Creatinine 1.46 (H) 04/26/2021 02:24 PM    Creatinine 1.41 (H) 07/06/2020 12:05 AM    Creatinine 1.60 (H) 10/18/2019 03:49 PM    Creatinine 1.33 (H) 09/09/2018 09:15 PM    Creatinine 1.36 (H) 09/01/2018 12:30 PM    BUN/Creatinine ratio 8 (L) 04/26/2021 02:24 PM    BUN/Creatinine ratio 9 (L) 07/06/2020 12:05 AM    BUN/Creatinine ratio 7 (L) 10/18/2019 03:49 PM    BUN/Creatinine ratio 7 (L) 09/09/2018 09:15 PM    BUN/Creatinine ratio 8 (L) 09/01/2018 12:30 PM    GFR est AA >60 04/26/2021 02:24 PM    GFR est AA >60 07/06/2020 12:05 AM    GFR est AA 54 (L) 10/18/2019 03:49 PM    GFR est AA >60 09/09/2018 09:15 PM    GFR est AA >60 09/01/2018 12:30 PM    GFR est non-AA 50 (L) 04/26/2021 02:24 PM    GFR est non-AA 52 (L) 07/06/2020 12:05 AM    GFR est non-AA 45 (L) 10/18/2019 03:49 PM    GFR est non-AA 56 (L) 09/09/2018 09:15 PM    GFR est non-AA 55 (L) 09/01/2018 12:30 PM    Calcium 8.4 (L) 04/26/2021 02:24 PM    Calcium 8.4 (L) 07/06/2020 12:05 AM    Calcium 8.9 10/18/2019 03:49 PM    Calcium 8.7 09/09/2018 09:15 PM    Calcium 9.5 09/01/2018 12:30 PM    Bilirubin, total 0.3 04/26/2021 02:24 PM    Bilirubin, total 0.4 07/06/2020 12:05 AM    Bilirubin, total 0.8 10/18/2019 03:49 PM    Bilirubin, total 0.2 09/09/2018 09:15 PM    Bilirubin, total 0.7 09/01/2018 12:30 PM    Alk. phosphatase 60 04/26/2021 02:24 PM    Alk. phosphatase 66 07/06/2020 12:05 AM    Alk. phosphatase 64 10/18/2019 03:49 PM    Alk. phosphatase 68 09/09/2018 09:15 PM    Alk. phosphatase 79 09/01/2018 12:30 PM    Protein, total 6.4 04/26/2021 02:24 PM    Protein, total 7.0 07/06/2020 12:05 AM    Protein, total 7.2 10/18/2019 03:49 PM    Protein, total 6.7 09/09/2018 09:15 PM    Protein, total 7.5 09/01/2018 12:30 PM    Albumin 3.4 (L) 04/26/2021 02:24 PM    Albumin 3.8 07/06/2020 12:05 AM    Albumin 4.1 10/18/2019 03:49 PM    Albumin 3.5 09/09/2018 09:15 PM    Albumin 3.9 09/01/2018 12:30 PM    Globulin 3.0 04/26/2021 02:24 PM    Globulin 3.2 07/06/2020 12:05 AM    Globulin 3.1 10/18/2019 03:49 PM    Globulin 3.2 09/09/2018 09:15 PM    Globulin 3.6 09/01/2018 12:30 PM    A-G Ratio 1.1 04/26/2021 02:24 PM    A-G Ratio 1.2 07/06/2020 12:05 AM    A-G Ratio 1.3 10/18/2019 03:49 PM    A-G Ratio 1.1 09/09/2018 09:15 PM    A-G Ratio 1.1 09/01/2018 12:30 PM    ALT (SGPT) 20 04/26/2021 02:24 PM    ALT (SGPT) 40 07/06/2020 12:05 AM    ALT (SGPT) 25 10/18/2019 03:49 PM    ALT (SGPT) 29 09/09/2018 09:15 PM    ALT (SGPT) 33 09/01/2018 12:30 PM     Lab Results   Component Value Date/Time     10/18/2019 03:49 PM     Lab Results   Component Value Date/Time    Cholesterol, total 287 (H) 02/23/2010 09:06 AM    HDL Cholesterol 37 (L) 02/23/2010 09:06 AM    LDL, calculated 205.6 (H) 02/23/2010 09:06 AM    Triglyceride 222 (H) 02/23/2010 09:06 AM    CHOL/HDL Ratio 7.8 (H) 02/23/2010 09:06 AM     No results found for this or any previous visit.     Assessment:         Patient Active Problem List    Diagnosis Date Noted    Tubular adenoma     Cervical radiculopathy     Diverticulosis     Tricompartment osteoarthritis of both knees 11/01/2018    Chronic pain following surgery or procedure 03/11/2017    Closed head injury 08/09/2016    CAD (coronary artery disease) 02/04/2016    MI [facility]) [address] [facility] 02/04/2016    Essential hypertension 02/04/2016    COPD (chronic obstructive pulmonary disease) ([facility] 02/04/2016    Dyslipidemia 02/04/2016    GERD (gastroesophageal reflux disease) 02/04/2016    Asthma 02/23/2010     60 y.o. male is here for cardiac evaluation, last seen by me 11/16. S/p anterior wall STEMI 1/9/16, VF arrest [facility] ER, lytics/medflight to [facility], cath but no PCI (apparently non-obstructive CAD).   Had LifeVest and on meds as listed--ASA, plavix, high dose statin, ACEI, diltiazem (UDS + cocaine)--changed to Coreg at last visit.  Echo 2/5/16--mild LVH, LVEF 55-60, D1, trace MR.  Multiple co-morbidities--hypertension, dyslipidemia, GERD, chronic back pain, COPD/asthma, tobacco, pneumonia. Stress MPI 6/28/16--no ischemia, fixed inf wall defect, LVEF 58%. , hypetension, dyslipidemia, asthma/COPD, DJD, cervical radiculopathy, chronic pain, GERD, tubular adenoma, diveriticulosis. Mild HOLLY, occ chest tightness. No recent cardiac testing. Continues to see GI.       Plan:     Restart ASA 80  Echo/doppler  Stress MPI (HOLLY, prior MI/VF arrest, CAD)  Continue current meds/rx (consider stopping imdur)  GI complaints with w/u in progress--hx does not sound like mesenteric ischemia (could consider CTA or angio but unlikely)  Smoking cessation discussed  Fu after testing    Jennifer Rodgers MD

## 2021-06-16 NOTE — PROGRESS NOTES
Identified pt with two pt identifiers(name and ). Reviewed record in preparation for visit and have obtained necessary documentation. Chief Complaint   Patient presents with    New Patient     Referred by Dr. Richa Lake (gastroenterologist)    Coronary Artery Disease     \"massive heart attack in 2016\"    Hypertension    Cholesterol Problem      Vitals:    21 1013   BP: 110/80   Pulse: 80   Resp: 16   Temp: 98.3 °F (36.8 °C)   TempSrc: Temporal   SpO2: 98%   Weight: 189 lb (85.7 kg)   Height: 6' 2\" (1.88 m)   PainSc:   9   PainLoc: Abdomen       Medications reviewed/approved by Dr. Quynh Armenta. A verbal order from Dr. Quynh Armenta was received with [facility]B to remove any medications that were deleted during the visit. Medication(s) removed  predniSONE (DELTASONE) 10 mg tablet      Health Maintenance Review: Patient reminded of \"due or due soon\" health maintenance. I have asked the patient to contact his/her primary care provider (PCP) for follow-up on his/her health maintenance. Coordination of Care Questionnaire:  :   1) Have you been to an emergency room, urgent care, or hospitalized since your last visit? If yes, where when, and reason for visit? no - new pt      2. Have seen or consulted any other health care provider since your last visit? If yes, where when, and reason for visit? NO - new pt      Patient is accompanied by self I have received verbal consent from Jesse Arvizu. to discuss any/all medical information while they are present in the room.

## 2021-06-23 ENCOUNTER — HOSPITAL ENCOUNTER (OUTPATIENT)
Dept: ULTRASOUND IMAGING | Age: 56
Discharge: HOME | End: 2021-06-23
Attending: INTERNAL MEDICINE
Payer: MEDICAID

## 2021-06-23 DIAGNOSIS — I10 ESSENTIAL HYPERTENSION: ICD-10-CM

## 2021-06-23 DIAGNOSIS — E78.5 DYSLIPIDEMIA: ICD-10-CM

## 2021-06-23 DIAGNOSIS — I25.118 CORONARY ARTERY DISEASE INVOLVING NATIVE CORONARY ARTERY OF NATIVE HEART WITH OTHER FORM OF ANGINA PECTORIS (HCC): ICD-10-CM

## 2021-06-23 DIAGNOSIS — J41.0 SIMPLE CHRONIC BRONCHITIS (HCC): ICD-10-CM

## 2021-06-23 LAB
ECHO AO ROOT DIAM: 3.95 CM
ECHO AV PEAK GRADIENT: 3.58 MMHG
ECHO AV PEAK VELOCITY: 94.65 CM/S
ECHO EST RA PRESSURE: 10 MMHG
ECHO LA MAJOR AXIS: 3.28 CM
ECHO LV E' SEPTAL VELOCITY: 7.87 CM/S
ECHO LV INTERNAL DIMENSION DIASTOLIC: 4.1 CM (ref 4.2–5.9)
ECHO LV INTERNAL DIMENSION SYSTOLIC: 2.83 CM
ECHO LV IVSD: 1.3 CM (ref 0.6–1)
ECHO LV MASS 2D: 187.9 G (ref 88–224)
ECHO LV POSTERIOR WALL DIASTOLIC: 1.25 CM (ref 0.6–1)
ECHO LVOT DIAM: 2.22 CM
ECHO MV A VELOCITY: 62.68 CM/S
ECHO MV E DECELERATION TIME (DT): 275.95 MS
ECHO MV E VELOCITY: 92.69 CM/S
ECHO MV E/A RATIO: 1.48
ECHO MV E/E' SEPTAL: 11.78
ECHO RIGHT VENTRICULAR SYSTOLIC PRESSURE (RVSP): 25.94 MMHG
ECHO TV REGURGITANT MAX VELOCITY: 199.19 CM/S
ECHO TV REGURGITANT PEAK GRADIENT: 15.94 MMHG

## 2021-06-23 PROCEDURE — 93306 TTE W/DOPPLER COMPLETE: CPT

## 2021-06-23 PROCEDURE — 93306 TTE W/DOPPLER COMPLETE: CPT | Performed by: INTERNAL MEDICINE

## 2021-06-24 ENCOUNTER — TELEPHONE (OUTPATIENT)
Dept: CARDIOLOGY CLINIC | Age: 56
End: 2021-06-24

## 2021-06-24 NOTE — TELEPHONE ENCOUNTER
Spoke with the patient. Verified patient with two patient identifiers. Results given and questions answered. Patient verbalized understanding. NMST scheduled for July.

## 2021-06-24 NOTE — TELEPHONE ENCOUNTER
----- Message from Kd Ty MD sent at 6/23/2021 12:58 PM EDT -----  Regarding: echo  Advise echo shows normal LV pumping function, only mild tricuspid valve regurg--no concerns. Stress MPI not yet done.   Thanks [facility]

## 2021-07-15 ENCOUNTER — TELEPHONE (OUTPATIENT)
Dept: NON INVASIVE DIAGNOSTICS | Age: 56
End: 2021-07-15

## 2021-09-20 ENCOUNTER — HOSPITAL ENCOUNTER (EMERGENCY)
Age: 56
Discharge: HOME | End: 2021-09-20
Attending: FAMILY MEDICINE
Payer: MEDICAID

## 2021-09-20 ENCOUNTER — APPOINTMENT (OUTPATIENT)
Dept: GENERAL RADIOLOGY | Age: 56
End: 2021-09-20
Attending: EMERGENCY MEDICINE
Payer: MEDICAID

## 2021-09-20 VITALS
BODY MASS INDEX: 23.61 KG/M2 | HEIGHT: 74 IN | SYSTOLIC BLOOD PRESSURE: 146 MMHG | TEMPERATURE: 98.9 F | OXYGEN SATURATION: 97 % | WEIGHT: 184 LBS | RESPIRATION RATE: 16 BRPM | HEART RATE: 68 BPM | DIASTOLIC BLOOD PRESSURE: 97 MMHG

## 2021-09-20 DIAGNOSIS — S22.42XA CLOSED FRACTURE OF MULTIPLE RIBS OF LEFT SIDE, INITIAL ENCOUNTER: Primary | ICD-10-CM

## 2021-09-20 PROCEDURE — 74011250637 HC RX REV CODE- 250/637: Performed by: EMERGENCY MEDICINE

## 2021-09-20 PROCEDURE — 71101 X-RAY EXAM UNILAT RIBS/CHEST: CPT

## 2021-09-20 PROCEDURE — 77030027138 HC INCENT SPIROMETER -A

## 2021-09-20 PROCEDURE — 99283 EMERGENCY DEPT VISIT LOW MDM: CPT

## 2021-09-20 RX ORDER — OXYCODONE HYDROCHLORIDE 5 MG/1
5 TABLET ORAL
Status: COMPLETED | OUTPATIENT
Start: 2021-09-20 | End: 2021-09-20

## 2021-09-20 RX ORDER — OXYCODONE AND ACETAMINOPHEN 5; 325 MG/1; MG/1
1 TABLET ORAL
Qty: 12 TABLET | Refills: 0 | Status: SHIPPED | OUTPATIENT
Start: 2021-09-20 | End: 2021-09-23

## 2021-09-20 RX ADMIN — OXYCODONE HYDROCHLORIDE 5 MG: 5 TABLET ORAL at 07:45

## 2021-09-20 NOTE — ED NOTES
I have reviewed discharge instructions with the patient. The patient verbalized understanding. Discharge medications discussed with patient. No questions at this time. Able to dress self with discomfort. Encouraged the splinting and no smoking and limitations with the narcotics.  To car via wheelchair at patient's request

## 2021-09-20 NOTE — ED NOTES
Patient educated on medications to be administered. Verified  Allergies. Pain medication administered as ordered. Bed rails up and call bell within reach.
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
## 2021-12-28 ENCOUNTER — TRANSCRIBE ORDER (OUTPATIENT)
Dept: REGISTRATION | Age: 56
End: 2021-12-28

## 2021-12-28 ENCOUNTER — HOSPITAL ENCOUNTER (OUTPATIENT)
Dept: GENERAL RADIOLOGY | Age: 56
Discharge: HOME | End: 2021-12-28
Payer: MEDICAID

## 2021-12-28 DIAGNOSIS — M25.561 RIGHT KNEE PAIN: ICD-10-CM

## 2021-12-28 DIAGNOSIS — M25.561 RIGHT KNEE PAIN: Primary | ICD-10-CM

## 2021-12-28 PROCEDURE — 73564 X-RAY EXAM KNEE 4 OR MORE: CPT

## 2022-01-27 NOTE — PROGRESS NOTES
Sreekanth Renteria NP      Subjective/HPI:     Danielle Campbell is a 60 y.o. male is here for f/u appt and cardiac clearance for erika knee surgery with Dr Rafael Duarte. Past med hx of CAD with anterior wall STEMI 1/9/16, VF arrest [facility] ER, lytics/medflight to [facility], cath but no PCI (apparently non-obstructive CAD). [facility] had a LifeVest and on meds- Echo 2/5/16--mild LVH, LVEF 55-60%, D1, trace MRDanuta  Multiple co-morbidities--hypertension, dyslipidemia, GERD, chronic back pain, COPD/asthma, and tobacco use. At his last appt 6/16/21 he was reporting mild HOLLY. Echo obtained with EF 55-60%. Today  He denies chest pain, SOB/HOLLY, orthopnea, PND, or LE edema. Denies palpitations, syncope, presyncope or fatigue. PCP Provider  Heather Arrieta MD  Past Medical History:   Diagnosis Date    Acute MI, anterior wall ([address] 01/09/2016    VF arrest/lytics/[facility]flight [facility]/cath/no PCI    Asthma     Cervical radiculopathy     Chronic obstructive pulmonary disease (HCC)     Chronic pain     Coronary heart disease     Diverticulosis     GERD (gastroesophageal reflux disease)     Hypertension     Pneumonia     PTSD (post-traumatic stress disorder)     Shingles     Tubular adenoma       Past Surgical History:   Procedure Laterality Date    HX KNEE ARTHROSCOPY Right     HX ORTHOPAEDIC      removed a mass from right palm oct 13 2017right wrist fxr repair 2016 dec    HX ROTATOR CUFF REPAIR       Allergies   Allergen Reactions    Pcn [Penicillins] Unknown (comments)      Family History   Family history unknown: Yes      Current Outpatient Medications   Medication Sig    aspirin delayed-release 81 mg tablet Take 1 Tablet by mouth daily.  dicyclomine (BENTYL) 10 mg capsule Take 1 Cap by mouth four (4) times daily as needed for Abdominal Cramps.  ondansetron (ZOFRAN ODT) 4 mg disintegrating tablet Take 1 Tab by mouth every eight (8) hours as needed for Nausea.     polyethylene glycol (Miralax) 17 gram/dose powder Take 17 g by mouth daily. 1 tablespoon with 8 oz of water daily    cyclobenzaprine (FLEXERIL) 10 mg tablet Take 1 Tab by mouth three (3) times daily as needed for Muscle Spasm(s).  ipratropium (ATROVENT HFA) 17 mcg/actuation inhaler Take 1 Puff by inhalation three (3) times daily.  ipratropium (ATROVENT) 0.02 % soln 2.5 mL by Nebulization route every four (4) hours as needed.  benzonatate (TESSALON PERLES) 100 mg capsule Take 100 mg by mouth three (3) times daily as needed for Cough.  lansoprazole (PREVACID PO) Take 1 Each by mouth daily.  carvedilol (COREG) 6.25 mg tablet Take 1 Tab by mouth two (2) times daily (with meals).  isosorbide mononitrate ER (IMDUR) 30 mg tablet Take 1 Tab by mouth daily.  budesonide-formoterol (SYMBICORT) 160-4.5 mcg/actuation HFA inhaler Take 2 Puffs by inhalation two (2) times a day.  albuterol (PROVENTIL HFA, VENTOLIN HFA, PROAIR HFA) 90 mcg/actuation inhaler Take 1 Puff by inhalation every six (6) hours as needed for Wheezing.  albuterol (PROVENTIL VENTOLIN) 2.5 mg /3 mL (0.083 %) nebulizer solution 3 mL by Nebulization route as needed.  atorvastatin (LIPITOR) 80 mg tablet Take 1 Tab by mouth daily.  lisinopril (PRINIVIL, ZESTRIL) 20 mg tablet Take 1 Tab by mouth daily.  traZODone (DESYREL) 150 mg tablet Take 2 Tabs by mouth nightly.  nitroglycerin (NITROSTAT) 0.4 mg SL tablet 1 Tab by SubLINGual route every five (5) minutes as needed for Chest Pain.  Blood Pressure Monitor kit Use as directed     No current facility-administered medications for this visit. There were no vitals filed for this visit.   Social History     Socioeconomic History    Marital status: SINGLE     Spouse name: Not on file    Number of children: 10    Years of education: Not on file    Highest education level: Not on file   Occupational History    Occupation: disability   Tobacco Use    Smoking status: Current Every Day Smoker Packs/day: 0.25     Types: Cigarettes    Smokeless tobacco: Never Used    Tobacco comment: 4-5 ciggs per day. Substance and Sexual Activity    Alcohol use: Yes     Alcohol/week: 1.0 standard drink     Types: 1 Shots of liquor per week     Comment: social    Drug use: No    Sexual activity: Not Currently     Partners: Female   Other Topics Concern    Not on file   Social History Narrative    Not on file     Social Determinants of Health     Financial Resource Strain:     Difficulty of Paying Living Expenses: Not on file   Food Insecurity:     Worried About Running Out of Food in the Last Year: Not on file    [place] of Food in the Last Year: Not on file   Transportation Needs:     Lack of Transportation (Medical): Not on file    Lack of Transportation (Non-Medical): Not on file   Physical Activity:     Days of Exercise per Week: Not on file    Minutes of Exercise per Session: Not on file   Stress:     Feeling of Stress : Not on file   Social Connections:     Frequency of Communication with Friends and Family: Not on file    Frequency of Social Gatherings with Friends and Family: Not on file    Attends Jew Services: Not on file    Active Member of [address] [place] [address] or Organizations: Not on file    Attends Club or Organization Meetings: Not on file    Marital Status: Not on file   Intimate Partner Violence:     Fear of Current or Ex-Partner: Not on file    Emotionally Abused: Not on file    Physically Abused: Not on file    Sexually Abused: Not on file   Housing Stability:     Unable to Pay for Housing in the Last Year: Not on file    Number of [place] in the Last Year: Not on file    Unstable Housing in the Last Year: Not on file       I have reviewed the nurses notes, vitals, problem list, allergy list, medical history, family, social history and medications.     Review of Symptoms:  11 systems reviewed, negative other than as stated in the HPI    VS obtained in office  98.3 HR 87 BP 132/80 weight 199lbs sat 100%    Physical Exam:    General PE  Gen:  NAD  Mental Status - Alert. General Appearance - Not in acute distress. HEENT:  PER, EOM, no JVD  Chest and Lung Exam   Inspection: Accessory muscles - No use of accessory muscles in breathing. No audible wheezing. Normal effort in breathing. Symmetric excursion. Cardiovascular:  No JVD  Upper Extremity: Inspection - Bilateral - No Cyanotic nailbeds or Digital clubbing. Lower Extremity:   Palpation: Edema - Bilateral - No edema. Neuro: A&O times 3, CN and motor grossly WNL  Skin: no rashes or lesions on exposed skin, dry, intact  Psych: normal mood, affect. Speech clear. Cardiographics    ECG: SR     6/23/21 echo EF 55-60%, mild concentric hypertrophy. Mild TR    Stress MPI 6/28/16--no ischemia, fixed inf wall defect, LVEF 58%. Cardiology Labs:  Lab Results   Component Value Date/Time    Cholesterol, total 287 (H) 02/23/2010 09:06 AM    HDL Cholesterol 37 (L) 02/23/2010 09:06 AM    LDL, calculated 205.6 (H) 02/23/2010 09:06 AM    VLDL, calculated 44.4 02/23/2010 09:06 AM    CHOL/HDL Ratio 7.8 (H) 02/23/2010 09:06 AM     Lab Results   Component Value Date/Time    Sodium 141 04/26/2021 02:24 PM    Potassium 4.0 04/26/2021 02:24 PM    Chloride 104 04/26/2021 02:24 PM    CO2 27 04/26/2021 02:24 PM    Glucose 95 04/26/2021 02:24 PM    BUN 12 04/26/2021 02:24 PM    Creatinine 1.46 (H) 04/26/2021 02:24 PM    BUN/Creatinine ratio 8 (L) 04/26/2021 02:24 PM    GFR est AA >60 04/26/2021 02:24 PM    GFR est non-AA 50 (L) 04/26/2021 02:24 PM    Calcium 8.4 (L) 04/26/2021 02:24 PM    Anion gap 10 04/26/2021 02:24 PM    Bilirubin, total 0.3 04/26/2021 02:24 PM    ALT (SGPT) 20 04/26/2021 02:24 PM    Alk.  phosphatase 60 04/26/2021 02:24 PM    Protein, total 6.4 04/26/2021 02:24 PM    Albumin 3.4 (L) 04/26/2021 02:24 PM    Globulin 3.0 04/26/2021 02:24 PM    A-G Ratio 1.1 04/26/2021 02:24 PM     No results found for: HBA1C, HOL3TFIA, WIM8ZRUL, TAR9IDKJ     Assessment:         ICD-10-CM ICD-9-CM    1. Coronary artery disease involving native coronary artery of native heart without angina pectoris  I25.10 414.01    2. Essential hypertension  I10 401.9    3. Dyslipidemia  E78.5 272.4    4. Gastroesophageal reflux disease without esophagitis  K21.9 530.81    5. Mild intermittent asthma without complication  X06.79 994.01    6. Hx of ventricular fibrillation  Z86.79 V12.59    7. Hx of cardiac arrest  Z86.74 V12.53           Plan:     Denies angina or anginal equivalent complaints. He is able to perform his normal activities without symptoms. ECG SR. Echo 2021 with EF 55-60%. He is taking ASA,  Lipitor, and Lisinopril. 4/26/21 Creatinine 1.46. Lipids and labs managed by PCP. He has been off Coreg for years d/t side effect, at this time he cant remember what that was. No longer on Imdur. He can be cleared for his surgery holding asa for 7 days prior to procedure, resuming once stable to do so. F/u in 6 months, sooner NAZ Ordoñez, was evaluated through a hybrid synchronous (real-time) audio-video encounter. The patient (or guardian if applicable) is aware that this is a billable service. Verbal consent to proceed has been obtained within the past 12 months. The visit was conducted pursuant to the emergency declaration under the [address] [place] [address] and the [email or web address] and [employer] General Act. Patient identification was verified, and a caregiver was present when appropriate. The patient was located in our [place] [address] office where a nurse obtained VS and EKG and I was present in our [address] location where I am credentialed to provide care.     On this date 1/27/2022  I have spent 33 minutes reviewing previous notes, test results and face to face with the patient discussing the diagnosis and importance of compliance with the treatment plan as well as documenting on the day of the visit.

## 2022-01-28 ENCOUNTER — VIRTUAL VISIT (OUTPATIENT)
Dept: CARDIOLOGY CLINIC | Age: 57
End: 2022-01-28
Payer: MEDICAID

## 2022-01-28 DIAGNOSIS — I25.10 CORONARY ARTERY DISEASE INVOLVING NATIVE CORONARY ARTERY OF NATIVE HEART WITHOUT ANGINA PECTORIS: Primary | ICD-10-CM

## 2022-01-28 DIAGNOSIS — Z86.79 HX OF VENTRICULAR FIBRILLATION: ICD-10-CM

## 2022-01-28 DIAGNOSIS — J45.20 MILD INTERMITTENT ASTHMA WITHOUT COMPLICATION: ICD-10-CM

## 2022-01-28 DIAGNOSIS — E78.5 DYSLIPIDEMIA: ICD-10-CM

## 2022-01-28 DIAGNOSIS — Z86.74 HX OF CARDIAC ARREST: ICD-10-CM

## 2022-01-28 DIAGNOSIS — K21.9 GASTROESOPHAGEAL REFLUX DISEASE WITHOUT ESOPHAGITIS: ICD-10-CM

## 2022-01-28 DIAGNOSIS — I10 ESSENTIAL HYPERTENSION: ICD-10-CM

## 2022-01-28 PROCEDURE — 93000 ELECTROCARDIOGRAM COMPLETE: CPT | Performed by: NURSE PRACTITIONER

## 2022-01-28 PROCEDURE — 99214 OFFICE O/P EST MOD 30 MIN: CPT | Performed by: NURSE PRACTITIONER

## 2022-01-28 NOTE — PROGRESS NOTES
Identified pt with two pt identifiers(name and ). Reviewed record in preparation for visit and have obtained necessary documentation. Chief Complaint   Patient presents with    Surgical Clearance     knee surery  Dr. Andrea Florez Systolic (Top) [id number] Systolic (Top). 132. The comment is Patient in office. Taken on 22 1005   Patient-Reported Diastolic (Bottom) [id number] Diastolic (Bottom). 80. The comment is Patient in office. Taken on 22 1005   BP Observation    Patient-Reported Pulse 87Patient-Reported Pulse. 87. The comment is Patient in office. Taken on 22 1005   Patient-Reported Temperature 98.3Patient-Reported Temperature. 98.3. The comment is Patient in office. Taken on 22 1005   Patient-Reported Weight 199Patient-Reported Weight. 199. The comment is Patient in office. Taken on 22 1005   Patient-Reported Pulse Oximetry 100Patient-Reported Pulse Oximetry. 100. The comment is Patient in office. Taken on 22 1005   Patient-Reported Peak Flow            Medications reviewed/approved by provider. Health Maintenance Review: Patient reminded of \"due or due soon\" health maintenance. I have asked the patient to contact his/her primary care provider (PCP) for follow-up on his/her health maintenance. Coordination of Care Questionnaire:  :   1) Have you been to an emergency room, urgent care, or hospitalized since your last visit? If yes, where when, and reason for visit? no    2. Have seen or consulted any other health care provider since your last visit? If yes, where when, and reason for visit? YES      Patient is accompanied by self I have received verbal consent from Bobbi Anna to discuss any/all medical information while they are present in the room.

## 2022-02-08 ENCOUNTER — TRANSCRIBE ORDER (OUTPATIENT)
Dept: SCHEDULING | Age: 57
End: 2022-02-08

## 2022-02-08 DIAGNOSIS — M25.561 RIGHT KNEE PAIN: Primary | ICD-10-CM

## 2022-02-08 DIAGNOSIS — M25.562 LEFT KNEE PAIN: ICD-10-CM

## 2022-02-11 ENCOUNTER — HOSPITAL ENCOUNTER (OUTPATIENT)
Dept: PREADMISSION TESTING | Age: 57
Discharge: HOME | End: 2022-02-11

## 2022-02-11 RX ORDER — PROMETHAZINE HYDROCHLORIDE AND CODEINE PHOSPHATE 6.25; 1 MG/5ML; MG/5ML
5 SOLUTION ORAL
COMMUNITY
End: 2022-03-09

## 2022-02-11 RX ORDER — PREGABALIN 150 MG/1
150 CAPSULE ORAL 2 TIMES DAILY
COMMUNITY
Start: 2021-06-04 | End: 2022-03-09

## 2022-02-11 RX ORDER — DICLOFENAC SODIUM 10 MG/G
2 GEL TOPICAL
COMMUNITY
Start: 2021-09-24

## 2022-02-16 ENCOUNTER — ANESTHESIA EVENT (OUTPATIENT)
Dept: SURGERY | Age: 57
End: 2022-02-16
Payer: MEDICAID
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
## 2022-02-18 ENCOUNTER — HOSPITAL ENCOUNTER (OUTPATIENT)
Dept: PREADMISSION TESTING | Age: 57
Discharge: HOME | End: 2022-02-18
Attending: ORTHOPAEDIC SURGERY
Payer: MEDICAID

## 2022-02-18 PROCEDURE — U0005 INFEC AGEN DETEC AMPLI PROBE: HCPCS

## 2022-02-19 LAB
SARS-COV-2, XPLCVT: NOT DETECTED
SOURCE, COVRS: NORMAL

## 2022-02-20 PROBLEM — M17.11 PRIMARY OSTEOARTHRITIS OF RIGHT KNEE: Status: ACTIVE | Noted: 2022-02-20

## 2022-02-20 NOTE — H&P
History and Physical    Patient: Mary Lou Peoples MRN: [id number]  SSN: [id number]    YOB: 1965  Age: 60 y.o. Sex: male      Subjective:      Mary Lou Peoples is a 60 y.o. male who  I have evaluated in my office on 01/17/2022. He presented to the office with complaints of bilateral knee pain. Right greater than left. He has had a history of osteoarthritis of both knees. He has had trials of anti-inflammatory medication without relief. He has undergone intermittent steroid injections providing very limited relief of his knee pain. He has had progressive worsening activity-related pain both knees to the point where he is unable to get around but for short distances at a time. He has a significant cardiac history and has undergone cardiac evaluation and clearance for surgical treatment. He is also to undergo arterial Dopplers to the lower extremity since he does have a history of vascular disease preoperatively. I have documented palpable pulses in the office in both lower extremities. He has been on opiate pain medications in the past and we have discussed the adverse effects of these medications on postoperative pain control. He is a daily smoker. We have discussed the adverse effects of tobacco smoking on healing and vascular disease. We have discussed the risk of surgery including infection, damage to nerves and blood vessels, blood loss possibly requiring transfusion, blood clots in the lower extremities, loosening of the components, and potential loss of limb. Arterial dopplers show satisfactory flow in B LE. Reports a piece of glass in right foot. Small epithilialzed area, no infection. Does not appear to represent anything that would contraindicate surgery.     Past Medical History:   Diagnosis Date    Acute MI, anterior wall ([facility] [address] 01/09/2016    VF arrest/lytics/[facility]/cath/no PCI    Asthma     Cervical radiculopathy     Chronic obstructive pulmonary disease ([facility] [address]     Chronic pain     Coronary heart disease     Diverticulosis     GERD (gastroesophageal reflux disease)     Hypertension     Pneumonia     PTSD (post-traumatic stress disorder)     Shingles     Tubular adenoma      Past Surgical History:   Procedure Laterality Date    HX KNEE ARTHROSCOPY Right     HX ORTHOPAEDIC      removed a mass from right palm oct 13 2017right wrist fxr repair 2016 dec    HX ROTATOR CUFF REPAIR        Family History   Family history unknown: Yes     Social History     Tobacco Use    Smoking status: Current Every Day Smoker     Packs/day: 0.25     Types: Cigarettes    Smokeless tobacco: Never Used    Tobacco comment: 4-5 ciggs per day. Substance Use Topics    Alcohol use: Yes     Alcohol/week: 1.0 standard drink     Types: 1 Shots of liquor per week     Comment: social      Prior to Admission medications    Medication Sig Start Date End Date Taking? Authorizing Provider   diclofenac (VOLTAREN) 1 % gel Apply 2 g to affected area. 9/24/21   Provider, Historical   pregabalin (LYRICA) 150 mg capsule Take 150 mg by mouth two (2) times a day. 6/4/21   Provider, Historical   promethazine-codeine (PHENERGAN with CODEINE) 6.25-10 mg/5 mL syrup Take 5 mL by mouth every four (4) hours as needed. Provider, Historical   ondansetron (ZOFRAN ODT) 4 mg disintegrating tablet Take 1 Tab by mouth every eight (8) hours as needed for Nausea. 4/26/21   Pretty Mckinney MD   polyethylene glycol (Miralax) 17 gram/dose powder Take 17 g by mouth daily. 1 tablespoon with 8 oz of water daily 4/26/21   Pretty Mckinney MD   ipratropium (ATROVENT HFA) 17 mcg/actuation inhaler Take 1 Puff by inhalation three (3) times daily. 8/8/18   Genie Villalpando MD   ipratropium (ATROVENT) 0.02 % soln 2.5 mL by Nebulization route every four (4) hours as needed.  8/8/18   Genie Villalpando MD   budesonide-formoterol [facility]) 160-4.5 mcg/actuation HFA inhaler Take 2 Puffs by inhalation two (2) times a day. 8/1/16   Janet Bolton MD   albuterol (PROVENTIL HFA, VENTOLIN HFA, PROAIR HFA) 90 mcg/actuation inhaler Take 1 Puff by inhalation every six (6) hours as needed for Wheezing. 7/26/16   Janet Bolton MD   albuterol (PROVENTIL VENTOLIN) 2.5 mg /3 mL (0.083 %) nebulizer solution 3 mL by Nebulization route as needed. 7/1/16   Casimiro Jones NP   atorvastatin (LIPITOR) 80 mg tablet Take 1 Tab by mouth daily. 7/1/16   Casimiro Jones NP   lisinopril (PRINIVIL, ZESTRIL) 20 mg tablet Take 1 Tab by mouth daily. 7/1/16   Casimiro Jones NP   traZODone (DESYREL) 150 mg tablet Take 2 Tabs by mouth nightly. 6/23/16   Casimiro Jones NP   nitroglycerin (NITROSTAT) 0.4 mg SL tablet 1 Tab by SubLINGual route every five (5) minutes as needed for Chest Pain. 6/1/16   Desiree Moran MD   Blood Pressure Monitor kit Use as directed 2/10/16   Desiree Moran MD        Allergies   Allergen Reactions    Pcn [Penicillins] Unknown (comments)       Review of Systems:  A comprehensive review of systems was negative. Objective: There were no vitals filed for this visit. Physical Exam:  GENERAL: alert, cooperative, no distress, appears stated age  LUNG: clear to auscultation bilaterally  HEART: regular rate and rhythm, S1, S2 normal, no murmur, click, rub or gallop  ABDOMEN: soft, non-tender. Bowel sounds normal. No masses,  no organomegaly  EXTREMITIES:  extremities normal, atraumatic, no cyanosis or edema, Strong palpable pulses, Foot xray - for foreign body. SKIN: Normal. Intact over foot with no open areas  NEUROLOGIC: negative    Assessment:     Hospital Problems  Date Reviewed: 1/28/2022          Codes Class Noted POA    * (Principal) Primary osteoarthritis of right knee ICD-10-CM: M17.11  ICD-9-CM: 715.16  2/20/2022 Yes              Plan:       Right total knee arthroplasty.     Signed By: Taylor Jacobs MD     February 20, 2022

## 2022-02-21 NOTE — PROGRESS NOTES
Contacted the patient to discuss discharge plans following TRKR scheduled for 2/22/22, but patient informed me that he had to have more tests done and the surgery was cancelled and will be rescheduled in 2 weeks.

## 2022-02-22 ENCOUNTER — ANESTHESIA (OUTPATIENT)
Dept: SURGERY | Age: 57
End: 2022-02-22
Payer: MEDICAID

## 2022-02-24 ENCOUNTER — HOSPITAL ENCOUNTER (OUTPATIENT)
Dept: ULTRASOUND IMAGING | Age: 57
Discharge: HOME | End: 2022-02-24
Attending: ORTHOPAEDIC SURGERY
Payer: MEDICAID

## 2022-02-24 DIAGNOSIS — M25.562 LEFT KNEE PAIN: ICD-10-CM

## 2022-02-24 DIAGNOSIS — M25.561 RIGHT KNEE PAIN: ICD-10-CM

## 2022-02-24 LAB
LEFT ABI: 1.04
LEFT ARM BP: 148 MMHG
LEFT CALF PRESSURE: 204 MMHG
LEFT HIGH THIGH PRESSURE: 195 MMHG
LEFT LOW THIGH PRESSURE: 177 MMHG
LEFT POSTERIOR TIBIAL: 163 MMHG
RIGHT ABI: 1.11
RIGHT ARM BP: 156 MMHG
RIGHT CALF PRESSURE: 182 MMHG
RIGHT HIGH THIGH PRESSURE: 195 MMHG
RIGHT LOW THIGH PRESSURE: 190 MMHG
RIGHT POSTERIOR TIBIAL: 173 MMHG
VAS LEFT DORSALIS PEDIS BP: 161 MMHG
VAS RIGHT DORSALIS PEDIS BP: 170 MMHG

## 2022-02-24 PROCEDURE — 93923 UPR/LXTR ART STDY 3+ LVLS: CPT

## 2022-03-04 ENCOUNTER — APPOINTMENT (OUTPATIENT)
Dept: GENERAL RADIOLOGY | Age: 57
End: 2022-03-04
Attending: EMERGENCY MEDICINE
Payer: MEDICAID

## 2022-03-04 ENCOUNTER — HOSPITAL ENCOUNTER (OUTPATIENT)
Dept: PREADMISSION TESTING | Age: 57
Discharge: HOME | End: 2022-03-04
Attending: ORTHOPAEDIC SURGERY
Payer: MEDICAID

## 2022-03-04 ENCOUNTER — HOSPITAL ENCOUNTER (EMERGENCY)
Age: 57
Discharge: HOME | End: 2022-03-04
Attending: EMERGENCY MEDICINE
Payer: MEDICAID

## 2022-03-04 VITALS
SYSTOLIC BLOOD PRESSURE: 120 MMHG | DIASTOLIC BLOOD PRESSURE: 77 MMHG | RESPIRATION RATE: 16 BRPM | HEART RATE: 75 BPM | OXYGEN SATURATION: 100 % | TEMPERATURE: 97.9 F

## 2022-03-04 DIAGNOSIS — M79.671 RIGHT FOOT PAIN: Primary | ICD-10-CM

## 2022-03-04 PROCEDURE — U0005 INFEC AGEN DETEC AMPLI PROBE: HCPCS

## 2022-03-04 PROCEDURE — 99283 EMERGENCY DEPT VISIT LOW MDM: CPT

## 2022-03-04 PROCEDURE — 73620 X-RAY EXAM OF FOOT: CPT

## 2022-03-04 RX ORDER — CEPHALEXIN 500 MG/1
500 CAPSULE ORAL 3 TIMES DAILY
Qty: 15 CAPSULE | Refills: 0 | Status: SHIPPED | OUTPATIENT
Start: 2022-03-04 | End: 2022-03-09

## 2022-03-04 RX ORDER — BACITRACIN ZINC 500 [USP'U]/G
1 CREAM TOPICAL 2 TIMES DAILY
Status: DISCONTINUED | OUTPATIENT
Start: 2022-03-04 | End: 2022-03-04 | Stop reason: HOSPADM

## 2022-03-04 NOTE — ED PROVIDER NOTES
EMERGENCY DEPARTMENT HISTORY AND PHYSICAL EXAM          Date: 3/4/2022  Patient Name: Rashmi Cervantes. History of Presenting Illness     Chief Complaint   Patient presents with    Foreign Body Removal       History Provided By: Patient    HPI: Rashmi Rodriguez is a 60 y.o. male, pmhx hypertension, COPD, asthma, CAD, PTSD, presents ambulatory to the ER complaining of right foot pain. Patient states a month ago he stepped on some and he thought his wife got out the foreign body. He noticed for the past week its been hurting every time he steps on it. He has not had any fevers, chills or redness of the skin. Pt was not sure what he stepped on he was walking barefoot on carpet. PCP: Zofia Young MD    Allergies: Penicillin    There are no other complaints, changes, or physical findings at this time. Current Facility-Administered Medications   Medication Dose Route Frequency Provider Last Rate Last Admin    diph,Pertuss(AC),Tet Vac-PF (BOOSTRIX) suspension 0.5 mL  0.5 mL IntraMUSCular PRIOR TO DISCHARGE TermeerTessy MD         Current Outpatient Medications   Medication Sig Dispense Refill    cephALEXin (Keflex) 500 mg capsule Take 1 Capsule by mouth three (3) times daily. 15 Capsule 0    diclofenac (VOLTAREN) 1 % gel Apply 2 g to affected area.  pregabalin (LYRICA) 150 mg capsule Take 150 mg by mouth two (2) times a day.  promethazine-codeine (PHENERGAN with CODEINE) 6.25-10 mg/5 mL syrup Take 5 mL by mouth every four (4) hours as needed.  ondansetron (ZOFRAN ODT) 4 mg disintegrating tablet Take 1 Tab by mouth every eight (8) hours as needed for Nausea. 6 Tab 0    polyethylene glycol (Miralax) 17 gram/dose powder Take 17 g by mouth daily. 1 tablespoon with 8 oz of water daily 235 g 0    ipratropium (ATROVENT HFA) 17 mcg/actuation inhaler Take 1 Puff by inhalation three (3) times daily.  1 Inhaler 5    ipratropium (ATROVENT) 0.02 % soln 2.5 mL by Nebulization route every four (4) hours as needed. 30 Vial 0    budesonide-formoterol (SYMBICORT) 160-4.5 mcg/actuation HFA inhaler Take 2 Puffs by inhalation two (2) times a day. 1 Inhaler 5    albuterol (PROVENTIL HFA, VENTOLIN HFA, PROAIR HFA) 90 mcg/actuation inhaler Take 1 Puff by inhalation every six (6) hours as needed for Wheezing. 1 Inhaler 5    albuterol (PROVENTIL VENTOLIN) 2.5 mg /3 mL (0.083 %) nebulizer solution 3 mL by Nebulization route as needed. 2 Package 0    atorvastatin (LIPITOR) 80 mg tablet Take 1 Tab by mouth daily. 30 Tab 0    lisinopril (PRINIVIL, ZESTRIL) 20 mg tablet Take 1 Tab by mouth daily. 30 Tab 0    traZODone (DESYREL) 150 mg tablet Take 2 Tabs by mouth nightly. 60 Tab 3    nitroglycerin (NITROSTAT) 0.4 mg SL tablet 1 Tab by SubLINGual route every five (5) minutes as needed for Chest Pain. 1 Bottle 2    Blood Pressure Monitor kit Use as directed 1 Kit 0       Past History     Past Medical History:  Past Medical History:   Diagnosis Date    Acute MI, anterior wall ([facility] [address] 01/09/2016    VF arrest/lytics/[facility]/cath/no PCI    Asthma     Cervical radiculopathy     Chronic obstructive pulmonary disease (HCC)     Chronic pain     Coronary heart disease     Diverticulosis     GERD (gastroesophageal reflux disease)     Hypertension     Pneumonia     PTSD (post-traumatic stress disorder)     Shingles     Tubular adenoma        Past Surgical History:  Past Surgical History:   Procedure Laterality Date    HX KNEE ARTHROSCOPY Right     HX ORTHOPAEDIC      removed a mass from right palm oct 13 2017right wrist fxr repair 2016 dec    HX ROTATOR CUFF REPAIR         Family History:  Family History   Family history unknown: Yes       Social History:  Social History     Tobacco Use    Smoking status: Current Every Day Smoker     Packs/day: 0.25     Types: Cigarettes    Smokeless tobacco: Never Used    Tobacco comment: 4-5 ciggs per day. Substance Use Topics    Alcohol use:  Yes     Alcohol/week: 1.0 standard drink     Types: 1 Shots of liquor per week     Comment: social    Drug use: No       Allergies: Allergies   Allergen Reactions    Pcn [Penicillins] Unknown (comments)         Review of Systems   Review of Systems   Constitutional: Negative for activity change, appetite change, chills, fever and unexpected weight change. HENT: Negative for congestion. Eyes: Negative for pain and visual disturbance. Respiratory: Negative for cough and shortness of breath. Cardiovascular: Negative for chest pain. Gastrointestinal: Negative for abdominal pain, diarrhea, nausea and vomiting. Genitourinary: Negative for dysuria. Musculoskeletal: Negative for back pain. Skin: Positive for wound. Negative for rash. Neurological: Negative for headaches. Physical Exam   Physical Exam  Vitals and nursing note reviewed. Constitutional:       Appearance: He is well-developed. He is not diaphoretic. Comments: Thin male appearing comfortable slightly elevated blood pressure in minimal acute distress   HENT:      Head: Normocephalic and atraumatic. Eyes:      General:         Right eye: No discharge. Left eye: No discharge. Conjunctiva/sclera: Conjunctivae normal.      Pupils: Pupils are equal, round, and reactive to light. Cardiovascular:      Rate and Rhythm: Normal rate and regular rhythm. Pulmonary:      Effort: Pulmonary effort is normal. No respiratory distress. Musculoskeletal:         General: Normal range of motion. Cervical back: Normal range of motion and neck supple. Skin:     General: Skin is warm and dry. Findings: No rash. Comments: Plantar surface of the right foot with a small 0.25 cm round area where the skin is slightly raised, hard and tender. There is no surrounding erythema. Neurological:      Mental Status: He is alert and oriented to person, place, and time. Cranial Nerves: No cranial nerve deficit.       Motor: No abnormal muscle tone.       Diagnostic Study Results     Labs -   No results found for this or any previous visit (from the past 12 hour(s)). Radiologic Studies -   XR FOOT RT AP/LAT   Final Result   No acute abnormality. CT Results  (Last 48 hours)    None        CXR Results  (Last 48 hours)    None            Medical Decision Making   I am the first provider for this patient. I reviewed the vital signs, available nursing notes, past medical history, past surgical history, family history and social history. Vital Signs-Reviewed the patient's vital signs. Patient Vitals for the past 12 hrs:   Temp Pulse Resp BP SpO2   03/04/22 0946 97.9 °F (36.6 °C) 75 16 120/77 100 %       Pulse Oximetry Analysis - 100% on RA    Records Reviewed: Nursing Notes and Old Medical Records    Provider Notes (Medical Decision Making):   MDM: Middle-aged male presenting ambulatory to the ER with right foot pain on ambulation. Feels he has a foreign body in his skin. There is a small area that is raised and slightly hard but without erythema. We will send him over to x-ray to see if is anything that is radiopaque but I have explained that unless this is superficial, without having the appropriate equipment, I cannot doubt foreign bodies in the emergency room. ED Course:   Initial assessment performed. The patients presenting problems have been discussed, and they are in agreement with the care plan formulated and outlined with them. I have encouraged them to ask questions as they arise throughout their visit. PROGRESS NOTE:  10:23 AM   Pt sitting comfortably in bed. Discussed x-ray results. Covered foot in Betadine and used 1 mL of 1% lidocaine with epi to numb up the area. Opening the superficial layers of the skin does not appear to be any superficial foreign body can be removed. There does not appear to be anything that looks like a plantar wart.   Recommend patient follow-up with general surgery for exploration if his pain worsens. In the meantime we will provide him antibiotics for couple days given his increasing pain over the past week          Discharge note:  Pt re-evaluated, ready for discharge. Updated pt on all final x-ray findings. Will follow up as instructed. All questions have been answered, pt voiced understanding and agreement with plan. Abx were prescribed, pt advised that diarrhea and rash are possible side effects of the medications. Specific return precautions provided as well as instructions to return to the ED should sx worsen at any time. Vital signs stable for discharge. Critical Care Time:   0      Diagnosis     Clinical Impression:   1. Right foot pain        PLAN:  1. Current Discharge Medication List      START taking these medications    Details   cephALEXin (Keflex) 500 mg capsule Take 1 Capsule by mouth three (3) times daily. Qty: 15 Capsule, Refills: 0  Start date: 3/4/2022           2. Follow-up Information     Follow up With Specialties Details Why Contact Brannon Bunch MD General Surgery Schedule an appointment as soon as possible for a visit   [address] [address]  [phone]      [address] [place] [address] Emergency Medicine  If symptoms worsen with sign of infection like redness, swelling or fever [address]  [phone]        Return to ED if worse     Disposition:  Home       Please note, this dictation was completed with Pentaho, the computer voice recognition software. Quite often unanticipated grammatical, syntax, homophones, and other interpretive errors are inadvertently transcribed by the computer software. Please disregard these errors. Please excuse any errors that have escaped final proof reading.

## 2022-03-05 LAB
SARS-COV-2, XPLCVT: NOT DETECTED
SOURCE, COVRS: NORMAL

## 2022-03-08 ENCOUNTER — HOSPITAL ENCOUNTER (OUTPATIENT)
Age: 57
Discharge: HOME | End: 2022-03-09
Attending: ORTHOPAEDIC SURGERY | Admitting: ORTHOPAEDIC SURGERY
Payer: MEDICAID

## 2022-03-08 ENCOUNTER — APPOINTMENT (OUTPATIENT)
Dept: GENERAL RADIOLOGY | Age: 57
End: 2022-03-08
Attending: ORTHOPAEDIC SURGERY
Payer: MEDICAID

## 2022-03-08 DIAGNOSIS — M17.11 PRIMARY OSTEOARTHRITIS OF RIGHT KNEE: Primary | ICD-10-CM

## 2022-03-08 PROCEDURE — 77030008463 HC STPLR SKN PROX J&J -B: Performed by: ORTHOPAEDIC SURGERY

## 2022-03-08 PROCEDURE — 77030028906 HC WRP KNEE W/GEL BGS SOLM -B: Performed by: ORTHOPAEDIC SURGERY

## 2022-03-08 PROCEDURE — 77030013708 HC HNDPC SUC IRR PULS STRY –B: Performed by: ORTHOPAEDIC SURGERY

## 2022-03-08 PROCEDURE — 74011250636 HC RX REV CODE- 250/636: Performed by: ORTHOPAEDIC SURGERY

## 2022-03-08 PROCEDURE — C1713 ANCHOR/SCREW BN/BN,TIS/BN: HCPCS | Performed by: ORTHOPAEDIC SURGERY

## 2022-03-08 PROCEDURE — 77030018673: Performed by: ORTHOPAEDIC SURGERY

## 2022-03-08 PROCEDURE — 76210000006 HC OR PH I REC 0.5 TO 1 HR: Performed by: ORTHOPAEDIC SURGERY

## 2022-03-08 PROCEDURE — 74011250637 HC RX REV CODE- 250/637: Performed by: ORTHOPAEDIC SURGERY

## 2022-03-08 PROCEDURE — 97165 OT EVAL LOW COMPLEX 30 MIN: CPT

## 2022-03-08 PROCEDURE — 77030000032 HC CUF TRNQT ZIMM -B: Performed by: ORTHOPAEDIC SURGERY

## 2022-03-08 PROCEDURE — 77030012935 HC DRSG AQUACEL BMS -B: Performed by: ORTHOPAEDIC SURGERY

## 2022-03-08 PROCEDURE — 76060000035 HC ANESTHESIA 2 TO 2.5 HR: Performed by: ORTHOPAEDIC SURGERY

## 2022-03-08 PROCEDURE — 88311 DECALCIFY TISSUE: CPT

## 2022-03-08 PROCEDURE — 74011000250 HC RX REV CODE- 250: Performed by: ORTHOPAEDIC SURGERY

## 2022-03-08 PROCEDURE — 74011250636 HC RX REV CODE- 250/636: Performed by: ANESTHESIOLOGY

## 2022-03-08 PROCEDURE — 74011000250 HC RX REV CODE- 250: Performed by: ANESTHESIOLOGY

## 2022-03-08 PROCEDURE — 97116 GAIT TRAINING THERAPY: CPT

## 2022-03-08 PROCEDURE — 74011000258 HC RX REV CODE- 258: Performed by: ORTHOPAEDIC SURGERY

## 2022-03-08 PROCEDURE — 77030010783 HC BOWL MX BN CEM J&J -B: Performed by: ORTHOPAEDIC SURGERY

## 2022-03-08 PROCEDURE — 73560 X-RAY EXAM OF KNEE 1 OR 2: CPT

## 2022-03-08 PROCEDURE — 76010000171 HC OR TIME 2 TO 2.5 HR INTENSV-TIER 1: Performed by: ORTHOPAEDIC SURGERY

## 2022-03-08 PROCEDURE — 88304 TISSUE EXAM BY PATHOLOGIST: CPT

## 2022-03-08 PROCEDURE — 74011000258 HC RX REV CODE- 258: Performed by: ANESTHESIOLOGY

## 2022-03-08 PROCEDURE — 77030020268 HC MISC GENERAL SUPPLY: Performed by: ORTHOPAEDIC SURGERY

## 2022-03-08 PROCEDURE — 77030031139 HC SUT VCRL2 J&J -A: Performed by: ORTHOPAEDIC SURGERY

## 2022-03-08 PROCEDURE — 97161 PT EVAL LOW COMPLEX 20 MIN: CPT

## 2022-03-08 PROCEDURE — 77030020813 HC INST SCULP CEM KT DISP S&N -B: Performed by: ORTHOPAEDIC SURGERY

## 2022-03-08 PROCEDURE — 77030040922 HC BLNKT HYPOTHRM STRY -A: Performed by: ORTHOPAEDIC SURGERY

## 2022-03-08 PROCEDURE — 2709999900 HC NON-CHARGEABLE SUPPLY: Performed by: ORTHOPAEDIC SURGERY

## 2022-03-08 PROCEDURE — C1776 JOINT DEVICE (IMPLANTABLE): HCPCS | Performed by: ORTHOPAEDIC SURGERY

## 2022-03-08 DEVICE — P.F.C. SIGMA OVAL DOME PATELLA 3-PEG 41MM CEMENTED
Type: IMPLANTABLE DEVICE | Site: KNEE | Status: FUNCTIONAL
Brand: P.F.C. SIGMA

## 2022-03-08 DEVICE — LCS SIGMA TIBIAL TRAY ROTATING PLATFORM MBT KEEL SIZE 5 CEMENTED
Type: IMPLANTABLE DEVICE | Site: KNEE | Status: FUNCTIONAL
Brand: LCS SIGMA

## 2022-03-08 DEVICE — SIGMA FEMORAL CRUCIATE RETAINING POROCOAT SIZE 5 RIGHT
Type: IMPLANTABLE DEVICE | Site: KNEE | Status: FUNCTIONAL
Brand: SIGMA

## 2022-03-08 DEVICE — CEMENT BNE 40GM FULL DOSE PMMA W/ GENT HI VISC RADPQ LNG: Type: IMPLANTABLE DEVICE | Site: KNEE | Status: FUNCTIONAL

## 2022-03-08 DEVICE — P.F.C. SIGMA TIBIAL INSERT FIXED BEARING CURVED 5 (CVD) 10MM GVF
Type: IMPLANTABLE DEVICE | Site: KNEE | Status: FUNCTIONAL
Brand: P.F.C. SIGMA

## 2022-03-08 DEVICE — KNEE K3 TOT HYBRID IMPL CAPPED SYNTHES: Type: IMPLANTABLE DEVICE | Site: KNEE | Status: FUNCTIONAL

## 2022-03-08 RX ORDER — ACETAMINOPHEN 325 MG/1
650 TABLET ORAL
Status: DISCONTINUED | OUTPATIENT
Start: 2022-03-08 | End: 2022-03-09 | Stop reason: HOSPADM

## 2022-03-08 RX ORDER — FENTANYL CITRATE 50 UG/ML
INJECTION, SOLUTION INTRAMUSCULAR; INTRAVENOUS AS NEEDED
Status: DISCONTINUED | OUTPATIENT
Start: 2022-03-08 | End: 2022-03-08 | Stop reason: HOSPADM

## 2022-03-08 RX ORDER — POLYETHYLENE GLYCOL 3350 17 G/17G
17 POWDER, FOR SOLUTION ORAL DAILY PRN
Status: DISCONTINUED | OUTPATIENT
Start: 2022-03-08 | End: 2022-03-09 | Stop reason: HOSPADM

## 2022-03-08 RX ORDER — PROPOFOL 10 MG/ML
INJECTION, EMULSION INTRAVENOUS AS NEEDED
Status: DISCONTINUED | OUTPATIENT
Start: 2022-03-08 | End: 2022-03-08 | Stop reason: HOSPADM

## 2022-03-08 RX ORDER — ACETAMINOPHEN 500 MG
1000 TABLET ORAL ONCE
Status: COMPLETED | OUTPATIENT
Start: 2022-03-08 | End: 2022-03-08

## 2022-03-08 RX ORDER — SODIUM CHLORIDE 0.9 % (FLUSH) 0.9 %
5-40 SYRINGE (ML) INJECTION EVERY 8 HOURS
Status: DISCONTINUED | OUTPATIENT
Start: 2022-03-08 | End: 2022-03-09 | Stop reason: HOSPADM

## 2022-03-08 RX ORDER — KETOROLAC TROMETHAMINE 30 MG/ML
INJECTION, SOLUTION INTRAMUSCULAR; INTRAVENOUS AS NEEDED
Status: DISCONTINUED | OUTPATIENT
Start: 2022-03-08 | End: 2022-03-08 | Stop reason: HOSPADM

## 2022-03-08 RX ORDER — HYDROMORPHONE HYDROCHLORIDE 1 MG/ML
1 INJECTION, SOLUTION INTRAMUSCULAR; INTRAVENOUS; SUBCUTANEOUS
Status: DISCONTINUED | OUTPATIENT
Start: 2022-03-08 | End: 2022-03-08

## 2022-03-08 RX ORDER — SODIUM CHLORIDE, SODIUM LACTATE, POTASSIUM CHLORIDE, CALCIUM CHLORIDE 600; 310; 30; 20 MG/100ML; MG/100ML; MG/100ML; MG/100ML
1000 INJECTION, SOLUTION INTRAVENOUS CONTINUOUS
Status: DISCONTINUED | OUTPATIENT
Start: 2022-03-08 | End: 2022-03-08

## 2022-03-08 RX ORDER — DEXAMETHASONE SODIUM PHOSPHATE 4 MG/ML
INJECTION, SOLUTION INTRA-ARTICULAR; INTRALESIONAL; INTRAMUSCULAR; INTRAVENOUS; SOFT TISSUE
Status: SHIPPED | OUTPATIENT
Start: 2022-03-08 | End: 2022-03-08

## 2022-03-08 RX ORDER — CEFAZOLIN SODIUM IN 0.9 % NACL 2 G/50 ML
2 INTRAVENOUS SOLUTION, PIGGYBACK (ML) INTRAVENOUS ONCE
Status: COMPLETED | OUTPATIENT
Start: 2022-03-08 | End: 2022-03-08

## 2022-03-08 RX ORDER — OXYCODONE AND ACETAMINOPHEN 5; 325 MG/1; MG/1
1 TABLET ORAL
Status: DISCONTINUED | OUTPATIENT
Start: 2022-03-08 | End: 2022-03-09 | Stop reason: HOSPADM

## 2022-03-08 RX ORDER — TRAZODONE HYDROCHLORIDE 100 MG/1
300 TABLET ORAL
Status: DISCONTINUED | OUTPATIENT
Start: 2022-03-08 | End: 2022-03-09 | Stop reason: HOSPADM

## 2022-03-08 RX ORDER — MIDAZOLAM HYDROCHLORIDE 1 MG/ML
INJECTION, SOLUTION INTRAMUSCULAR; INTRAVENOUS AS NEEDED
Status: DISCONTINUED | OUTPATIENT
Start: 2022-03-08 | End: 2022-03-08 | Stop reason: HOSPADM

## 2022-03-08 RX ORDER — ONDANSETRON 2 MG/ML
4 INJECTION INTRAMUSCULAR; INTRAVENOUS
Status: DISCONTINUED | OUTPATIENT
Start: 2022-03-08 | End: 2022-03-09 | Stop reason: HOSPADM

## 2022-03-08 RX ORDER — GUAIFENESIN 100 MG/5ML
81 LIQUID (ML) ORAL 2 TIMES DAILY
Status: DISCONTINUED | OUTPATIENT
Start: 2022-03-08 | End: 2022-03-09 | Stop reason: HOSPADM

## 2022-03-08 RX ORDER — FLUMAZENIL 0.1 MG/ML
INJECTION INTRAVENOUS AS NEEDED
Status: DISCONTINUED | OUTPATIENT
Start: 2022-03-08 | End: 2022-03-08 | Stop reason: HOSPADM

## 2022-03-08 RX ORDER — ONDANSETRON 2 MG/ML
INJECTION INTRAMUSCULAR; INTRAVENOUS AS NEEDED
Status: DISCONTINUED | OUTPATIENT
Start: 2022-03-08 | End: 2022-03-08 | Stop reason: HOSPADM

## 2022-03-08 RX ORDER — LISINOPRIL 20 MG/1
20 TABLET ORAL DAILY
Status: DISCONTINUED | OUTPATIENT
Start: 2022-03-08 | End: 2022-03-09 | Stop reason: HOSPADM

## 2022-03-08 RX ORDER — CELECOXIB 100 MG/1
200 CAPSULE ORAL ONCE
Status: COMPLETED | OUTPATIENT
Start: 2022-03-08 | End: 2022-03-08

## 2022-03-08 RX ORDER — GABAPENTIN 300 MG/1
900 CAPSULE ORAL ONCE
Status: COMPLETED | OUTPATIENT
Start: 2022-03-08 | End: 2022-03-08

## 2022-03-08 RX ORDER — ROPIVACAINE HYDROCHLORIDE 2 MG/ML
INJECTION, SOLUTION EPIDURAL; INFILTRATION; PERINEURAL
Status: SHIPPED | OUTPATIENT
Start: 2022-03-08 | End: 2022-03-08

## 2022-03-08 RX ORDER — SODIUM CHLORIDE 0.9 % (FLUSH) 0.9 %
5-40 SYRINGE (ML) INJECTION AS NEEDED
Status: DISCONTINUED | OUTPATIENT
Start: 2022-03-08 | End: 2022-03-09 | Stop reason: HOSPADM

## 2022-03-08 RX ORDER — ACETAMINOPHEN 650 MG/1
650 SUPPOSITORY RECTAL
Status: DISCONTINUED | OUTPATIENT
Start: 2022-03-08 | End: 2022-03-09 | Stop reason: HOSPADM

## 2022-03-08 RX ORDER — HYDROMORPHONE HYDROCHLORIDE 1 MG/ML
0.5 INJECTION, SOLUTION INTRAMUSCULAR; INTRAVENOUS; SUBCUTANEOUS
Status: DISCONTINUED | OUTPATIENT
Start: 2022-03-08 | End: 2022-03-09 | Stop reason: HOSPADM

## 2022-03-08 RX ORDER — ATORVASTATIN CALCIUM 40 MG/1
80 TABLET, FILM COATED ORAL DAILY
Status: DISCONTINUED | OUTPATIENT
Start: 2022-03-08 | End: 2022-03-09 | Stop reason: HOSPADM

## 2022-03-08 RX ORDER — HYDROCODONE BITARTRATE AND ACETAMINOPHEN 5; 325 MG/1; MG/1
1 TABLET ORAL
Status: DISCONTINUED | OUTPATIENT
Start: 2022-03-08 | End: 2022-03-08

## 2022-03-08 RX ORDER — CEFAZOLIN SODIUM IN 0.9 % NACL 2 G/50 ML
2 INTRAVENOUS SOLUTION, PIGGYBACK (ML) INTRAVENOUS EVERY 8 HOURS
Status: COMPLETED | OUTPATIENT
Start: 2022-03-08 | End: 2022-03-09

## 2022-03-08 RX ORDER — PROMETHAZINE HYDROCHLORIDE 25 MG/1
12.5 TABLET ORAL
Status: DISCONTINUED | OUTPATIENT
Start: 2022-03-08 | End: 2022-03-09 | Stop reason: HOSPADM

## 2022-03-08 RX ADMIN — MIDAZOLAM 4 MG: 1 INJECTION INTRAMUSCULAR; INTRAVENOUS at 07:45

## 2022-03-08 RX ADMIN — ASPIRIN 81 MG CHEWABLE TABLET 81 MG: 81 TABLET CHEWABLE at 17:02

## 2022-03-08 RX ADMIN — PROPOFOL 200 MG: 10 INJECTION, EMULSION INTRAVENOUS at 08:07

## 2022-03-08 RX ADMIN — SODIUM CHLORIDE, PRESERVATIVE FREE 10 ML: 5 INJECTION INTRAVENOUS at 22:20

## 2022-03-08 RX ADMIN — FENTANYL CITRATE 100 MCG: 50 INJECTION, SOLUTION INTRAMUSCULAR; INTRAVENOUS at 07:45

## 2022-03-08 RX ADMIN — HYDROMORPHONE HYDROCHLORIDE 1 MG: 1 INJECTION, SOLUTION INTRAMUSCULAR; INTRAVENOUS; SUBCUTANEOUS at 11:39

## 2022-03-08 RX ADMIN — GABAPENTIN 900 MG: 300 CAPSULE ORAL at 07:30

## 2022-03-08 RX ADMIN — SODIUM CHLORIDE, PRESERVATIVE FREE 10 ML: 5 INJECTION INTRAVENOUS at 14:49

## 2022-03-08 RX ADMIN — HYDROMORPHONE HYDROCHLORIDE 0.5 MG: 1 INJECTION, SOLUTION INTRAMUSCULAR; INTRAVENOUS; SUBCUTANEOUS at 20:06

## 2022-03-08 RX ADMIN — ATORVASTATIN CALCIUM 80 MG: 40 TABLET, FILM COATED ORAL at 11:35

## 2022-03-08 RX ADMIN — OXYCODONE AND ACETAMINOPHEN 1 TABLET: 5; 325 TABLET ORAL at 14:47

## 2022-03-08 RX ADMIN — SODIUM CHLORIDE, POTASSIUM CHLORIDE, SODIUM LACTATE AND CALCIUM CHLORIDE: 600; 310; 30; 20 INJECTION, SOLUTION INTRAVENOUS at 09:53

## 2022-03-08 RX ADMIN — LISINOPRIL 20 MG: 20 TABLET ORAL at 11:35

## 2022-03-08 RX ADMIN — ACETAMINOPHEN 1000 MG: 500 TABLET ORAL at 07:30

## 2022-03-08 RX ADMIN — HYDROMORPHONE HYDROCHLORIDE 0.5 MG: 1 INJECTION, SOLUTION INTRAMUSCULAR; INTRAVENOUS; SUBCUTANEOUS at 17:04

## 2022-03-08 RX ADMIN — TRAZODONE HYDROCHLORIDE 300 MG: 100 TABLET ORAL at 22:17

## 2022-03-08 RX ADMIN — TRANEXAMIC ACID 1 G: 1 INJECTION, SOLUTION INTRAVENOUS at 08:18

## 2022-03-08 RX ADMIN — CEFAZOLIN 2 G: 10 INJECTION, POWDER, FOR SOLUTION INTRAVENOUS at 07:55

## 2022-03-08 RX ADMIN — SODIUM CHLORIDE, PRESERVATIVE FREE 10 ML: 5 INJECTION INTRAVENOUS at 11:23

## 2022-03-08 RX ADMIN — SODIUM CHLORIDE, POTASSIUM CHLORIDE, SODIUM LACTATE AND CALCIUM CHLORIDE: 600; 310; 30; 20 INJECTION, SOLUTION INTRAVENOUS at 07:35

## 2022-03-08 RX ADMIN — KETOROLAC TROMETHAMINE 30 MG: 30 INJECTION, SOLUTION INTRAMUSCULAR at 09:58

## 2022-03-08 RX ADMIN — CEFAZOLIN 2 G: 10 INJECTION, POWDER, FOR SOLUTION INTRAVENOUS at 15:17

## 2022-03-08 RX ADMIN — CELECOXIB 200 MG: 100 CAPSULE ORAL at 07:30

## 2022-03-08 RX ADMIN — ROPIVACAINE HYDROCHLORIDE 60 MG: 2 INJECTION, SOLUTION EPIDURAL; INFILTRATION; PERINEURAL at 07:50

## 2022-03-08 RX ADMIN — ONDANSETRON 8 MG: 2 INJECTION INTRAMUSCULAR; INTRAVENOUS at 09:58

## 2022-03-08 RX ADMIN — FLUMAZENIL 0.4 MG: 0.1 INJECTION INTRAVENOUS at 10:15

## 2022-03-08 RX ADMIN — DEXAMETHASONE SODIUM PHOSPHATE 8 MG: 4 INJECTION, SOLUTION INTRAMUSCULAR; INTRAVENOUS at 07:50

## 2022-03-08 NOTE — PROGRESS NOTES
Problem: Patient Education: Go to Patient Education Activity  Goal: Patient/Family Education  Outcome: Progressing Towards Goal     Problem: Knee Replacement: Day of Surgery/Unit  Goal: Off Pathway (Use only if patient is Off Pathway)  Outcome: Resolved/Met  Goal: Activity/Safety  Outcome: Resolved/Met  Goal: Consults, if ordered  Outcome: Resolved/Met  Goal: Nutrition/Diet  Outcome: Resolved/Met  Goal: Medications  Outcome: Resolved/Met  Goal: Respiratory  Outcome: Resolved/Met  Goal: Treatments/Interventions/Procedures  Outcome: Resolved/Met  Goal: Psychosocial  Outcome: Resolved/Met  Goal: *Initiate mobility  Outcome: Resolved/Met  Goal: *Optimal pain control at patient's stated goal  Outcome: Resolved/Met  Goal: *Hemodynamically stable  Outcome: Resolved/Met     Problem: Knee Replacement: Post-Op Day 1  Goal: Off Pathway (Use only if patient is Off Pathway)  Outcome: Progressing Towards Goal  Goal: Activity/Safety  Outcome: Progressing Towards Goal  Goal: Diagnostic Test/Procedures  Outcome: Progressing Towards Goal  Goal: Nutrition/Diet  Outcome: Progressing Towards Goal  Goal: Medications  Outcome: Progressing Towards Goal  Goal: Respiratory  Outcome: Progressing Towards Goal  Goal: Treatments/Interventions/Procedures  Outcome: Progressing Towards Goal  Goal: Psychosocial  Outcome: Progressing Towards Goal  Goal: Discharge Planning  Outcome: Progressing Towards Goal  Goal: *Demonstrates progressive activity  Outcome: Progressing Towards Goal  Goal: *Optimal pain control at patient's stated goal  Outcome: Progressing Towards Goal  Goal: *Hemodynamically stable  Outcome: Progressing Towards Goal  Goal: *Discharge plan identified  Outcome: Progressing Towards Goal     Problem: Knee Replacement: Post-Op Day 2  Goal: Off Pathway (Use only if patient is Off Pathway)  Outcome: Progressing Towards Goal  Goal: Activity/Safety  Outcome: Progressing Towards Goal  Goal: Diagnostic Test/Procedures  Outcome: Progressing Towards Goal  Goal: Medications  Outcome: Progressing Towards Goal  Goal: Respiratory  Outcome: Progressing Towards Goal  Goal: Treatments/Interventions/Procedures  Outcome: Progressing Towards Goal  Goal: Psychosocial  Outcome: Progressing Towards Goal  Goal: *Met physical therapy criteria for discharge to the next level of care  Outcome: Progressing Towards Goal  Goal: *Optimal pain control with oral analgesia  Outcome: Progressing Towards Goal  Goal: *Hemodynamically stable  Outcome: Progressing Towards Goal  Goal: *Tolerating diet  Outcome: Progressing Towards Goal  Goal: *Patient verbalizes understanding of discharge instructions  Outcome: Progressing Towards Goal     Problem: Falls - Risk of  Goal: *Absence of Falls  Description: Document Abbi Fall Risk and appropriate interventions in the flowsheet.   Outcome: Progressing Towards Goal  Note: Fall Risk Interventions:            Medication Interventions: Patient to call before getting OOB,Teach patient to arise slowly                   Problem: Patient Education: Go to Patient Education Activity  Goal: Patient/Family Education  Outcome: Progressing Towards Goal     Problem: Pain  Goal: *Control of Pain  Outcome: Progressing Towards Goal     Problem: Patient Education: Go to Patient Education Activity  Goal: Patient/Family Education  Outcome: Progressing Towards Goal     Problem: Hypertension  Goal: *Blood pressure within specified parameters  Outcome: Progressing Towards Goal  Goal: *Fluid volume balance  Outcome: Progressing Towards Goal  Goal: *Labs within defined limits  Outcome: Progressing Towards Goal     Problem: Patient Education: Go to Patient Education Activity  Goal: Patient/Family Education  Outcome: Progressing Towards Goal

## 2022-03-08 NOTE — PROGRESS NOTES
OCCUPATIONAL THERAPY EVALUATION/DISCHARGE  Patient: Anatoly Patterson (57 y.o. male)  Date: 3/8/2022  Primary Diagnosis: Primary osteoarthritis of right knee [M17.11]  Procedure(s) (LRB):  RIGHT TOTAL KNEE ARTHROPLASTY (N/A) Day of Surgery   Precautions: R TKR 3/8/22       ASSESSMENT  Based on the objective data described below, the patient presents with s/p R TKR due to degenerative changes. His wife is present. Pt was independent in ADL/IADL tasks prior. He has never had a joint replaced. Experiencing some dizziness following pain med administration. Orders indicate \"up ad emma\" but no WB restrictions found in record. Pt was given socks and able to lean forward while in bed and get on both feet. He was able to get to EOB without physical assist. His BP was checked at 141/83 in supine and then at EOB was 117/75 which is greater than 10 point drop. Dizziness continued. OTR aborted attempt to stand at this time. Current Level of Function (ADLs/self-care): Unable to fully assess abilities due to inability to stand due to BP concerns. At this time he would need help getting clothing up/down for toileting and set-up sitting long in bed for bathing. Physical therapy will further assess needs based on transfer and ambulation. Functional Outcome Measure: The patient scored 45/100 on the Barthel Index outcome measure which is indicative of moderate need of assist for self care primarily due to inability to ambulate during eval.      Other factors to consider for discharge: lives with wife     PLAN :  Recommend with staff: BSC use if BP allows    Recommendation for discharge: (in order for the patient to meet his/her long term goals)  Occupational therapy at least 2 days/week in the home AND ensure assist and/or supervision for safety with position changes.     This discharge recommendation:  Has been made in collaboration with the attending provider and/or case management    IF patient discharges home will need the following DME: walker: rolling       SUBJECTIVE:   Patient stated I might need to sleep this one off.   Pt noting he's having dizziness and fatigue after pain medication. OBJECTIVE DATA SUMMARY:   HISTORY:   Past Medical History:   Diagnosis Date    Acute MI, anterior wall ([address] 01/09/2016    VF arrest/lytics/[facility]/cath/no PCI    Asthma     Cervical radiculopathy     Chronic obstructive pulmonary disease (HCC)     Chronic pain     Coronary heart disease     Diverticulosis     GERD (gastroesophageal reflux disease)     Hypertension     Pneumonia     PTSD (post-traumatic stress disorder)     Shingles     Tubular adenoma      Past Surgical History:   Procedure Laterality Date    HX KNEE ARTHROSCOPY Right     HX ORTHOPAEDIC      removed a mass from right palm oct 13 2017right wrist fxr repair 2016 dec    HX ROTATOR CUFF REPAIR         Prior Level of Function/Environment/Context: independent in ADL/IADL  Expanded or extensive additional review of patient history:   Home Situation  Home Environment: Private residence  # Steps to Enter: 4  One/Two Story Residence: One story  Living Alone: No  Support Systems: Spouse/Significant Other  Patient Expects to be Discharged to[de-identified] Home  Current DME Used/Available at Home: None  Tub or Shower Type: Shower    Hand dominance: Right    EXAMINATION OF PERFORMANCE DEFICITS:  Cognitive/Behavioral Status:     Perseveration: No perseveration noted     Skin: surgical wound covered    Edema: not significant in RLE and using ice    Hearing: Auditory  Auditory Impairment: None  Hearing Aids/Status: Does not own    Vision/Perceptual:           appears intact, no nystagmus      Range of Motion:    AROM: Within functional limits (BUE)    Strength:    Strength: Within functional limits (BUE)     Coordination:  Coordination: Within functional limits (BUE)  Fine Motor Skills-Upper: Left Intact; Right Intact    Gross Motor Skills-Upper: Left Intact; Right Intact    Tone & Sensation:     Sensation: Intact (BUE but only deep pressure RLE)    Balance:  Sitting: Intact  Standing:  (unbale to perform due to orthostatic change)    Functional Mobility and Transfers for ADLs:  Bed Mobility:  Rolling: Modified independent  Supine to Sit: Modified independent  Sit to Supine: Independent  Scooting: Independent    Transfers:   unable to perform due to BP concerns    ADL Assessment:  Feeding: Independent    Oral Facial Hygiene/Grooming: Setup    Bathing: Setup       Lower Body Dressing: Independent    Functional Measure:    Barthel Index:  Bathin  Bladder: 10  Bowels: 10  Groomin  Dressing: 10  Feeding: 10  Mobility: 0  Stairs: 0  Toilet Use: 5  Transfer (Bed to Chair and Back): 0  Total: 45/100      The Barthel ADL Index: Guidelines  1. The index should be used as a record of what a patient does, not as a record of what a patient could do. 2. The main aim is to establish degree of independence from any help, physical or verbal, however minor and for whatever reason. 3. The need for supervision renders the patient not independent. 4. A patient's performance should be established using the best available evidence. Asking the patient, friends/relatives and nurses are the usual sources, but direct observation and common sense are also important. However direct testing is not needed. 5. Usually the patient's performance over the preceding 24-48 hours is important, but occasionally longer periods will be relevant. 6. Middle categories imply that the patient supplies over 50 per cent of the effort. 7. Use of aids to be independent is allowed. Score Interpretation (from [address] [facility] [address])    Independent   60-79 Minimally independent   40-59 Partially dependent   20-39 Very dependent   <20 Totally dependent     -Luis Whitley., Barthel, D.W. (1965). Functional evaluation: the Barthel Index. [facility] ([address] (1997).  The Barthel activities of daily living index: self-reporting versus actual performance in the old (> or = 75 years). Journal of [address] [place] [address] 45(3), [facility], .J.KRISTINE.F, Audrey Sinclair.Rian. (). Measuring the change in disability after inpatient rehabilitation; comparison of the responsiveness of the Barthel Index and Functional Banks Measure. Journal of Neurology, Neurosurgery, and Psychiatry, 66(4), 362-389. ANATOLIY Muñoz, RODERICK Lind, & Viv Andres M.A. (2004) Assessment of post-stroke quality of life in cost-effectiveness studies: The usefulness of the Barthel Index and the EuroQoL-5D. Quality of Life Research, 15, 277-93         Occupational Therapy Evaluation Charge Determination   History Examination Decision-Making   LOW Complexity : Brief history review  LOW Complexity : 1-3 performance deficits relating to physical, cognitive , or psychosocial skils that result in activity limitations and / or participation restrictions  LOW Complexity : No comorbidities that affect functional and no verbal or physical assistance needed to complete eval tasks       Based on the above components, the patient evaluation is determined to be of the following complexity level: LOW   Pain Ratin-6 of 10 has had pain injection, feeling dizzy, nursing aware    Activity Tolerance:   Poor and signs and symptoms of orthostatic hypotension    After treatment patient left in no apparent distress:    Supine in bed, Call bell within reach and Caregiver / family present    COMMUNICATION/EDUCATION:   The patients plan of care was discussed with: Registered nurse.      Thank you for this referral.  Mona Figueredo OT  Time Calculation: 15 mins

## 2022-03-08 NOTE — ROUTINE PROCESS
TRANSFER - OUT REPORT:    Verbal report given to SUDHIR Calles RN(name) on Brian Ramesh.  being transferred to M/S(unit) for routine progression of care       Report consisted of patients Situation, Background, Assessment and   Recommendations(SBAR). Information from the following report(s) SBAR, Procedure Summary and MAR was reviewed with the receiving nurse. Lines:   Peripheral IV 03/08/22 Anterior;Distal;Right Wrist (Active)   Site Assessment Clean, dry, & intact 03/08/22 1026   Phlebitis Assessment 0 03/08/22 1026   Infiltration Assessment 0 03/08/22 1026   Dressing Status Clean, dry, & intact 03/08/22 1026   Dressing Type Transparent;Tape 03/08/22 1026   Hub Color/Line Status Pink 03/08/22 1026        Opportunity for questions and clarification was provided.       Patient transported with:   Registered Nurse

## 2022-03-08 NOTE — PROGRESS NOTES
Care Management Interventions  PCP Verified by CM: Yes Zayra Hargrove )  Last Visit to PCP: 09/24/21  Palliative Care Criteria Met (RRAT>21 & CHF Dx)?: No (No MD order)  Transition of Care Consult (CM Consult): Discharge Planning  MyChart Signup: No  Discharge Durable Medical Equipment: No  Physical Therapy Consult: Yes  Occupational Therapy Consult: Yes  Speech Therapy Consult: No  Support Systems: Spouse/Significant Other (Fiance)  Confirm Follow Up Transport: Family  The Plan for Transition of Care is Related to the Following Treatment Goals : Treat RTKR  Discharge Location  Patient Expects to be Discharged to[de-identified] Home with home health    Patient lives at home with his fiance Tamiko Bean who is a RN. Patient also states he has a son that is a doctor. Patient states he has NO ACP document but says he would choose his fiance Ms. Taiwo Waldron as his primary healthcare. Told him at this point, under law his son would be his primary healthcare decision maker under next of kin law unless he filled out an advanced care directive. Gave patient ACP pamphlet and template. Told him to look over options. Patient normally independent. Does not use any DME at home. Told him per OT, patient needs walker. Unable to order through [employer] due to out of service area. Told patient he could  a walker at a local pharmacy. Asked patient if he would like home health or outpatient therapy. He states \"I'm a little groggy right now. Sherryle Moder Sherryle Moder I'll think about it\". Will discuss this further with him tomorrow when less groggy. Patient told to care care management if he has any questions or concerns.     Reason for Admission:  RTKR                     RUR Score:   N/A PT IN OBS STATUS           RRAT: 8 LOW             Plan for utilizing home health:      TBD     PCP: First and Last name:  Eric Dueñas MD     Name of Practice: [facility]    Are you a current patient: Yes/No: yes   Approximate date of last visit: \"few months\"    Can you participate in a virtual visit with your PCP: Yes                    Current Advanced Directive/Advance Care Plan: Full Code      Healthcare Decision Maker:   Click here to complete [address] including selection of the Healthcare Decision Maker Relationship (ie \"Primary\")                             Transition of Care Plan:     Home w/HH vs outpatient PT                    Advance Care Planning     General Advance Care Planning (ACP) Conversation      Date of Conversation: 1/17/2022  Conducted with: Patient with Decision Making Capacity    Healthcare Decision Maker:   No healthcare decision makers have been documented. Click here to complete [address] including selection of the Healthcare Decision Maker Relationship (ie \"Primary\")    Today we documented desired Decision Maker(s), who is (are) NOT Legal Next of Kin. ACP documents are required for decision maker authority.     Content/Action Overview:   Has NO ACP documents/care preferences - requested patient complete ACP documents  Reviewed DNR/DNI and patient elects Full Code (Attempt Resuscitation)  Topics discussed: treatment goals  Additional Comments: n/a     Length of Voluntary ACP Conversation in minutes:  16 minutes    Kathia

## 2022-03-08 NOTE — PROGRESS NOTES
Problem: Mobility Impaired (Adult and Pediatric)  Goal: *Acute Goals and Plan of Care (Insert Text)  Note: FUNCTIONAL STATUS PRIOR TO ADMISSION: Patient was independent and active without use of DME.    HOME SUPPORT PRIOR TO ADMISSION: The patient lived with spouse but did not require assist.    Physical Therapy Goals  Initiated 3/8/2022      1. Patient will transfer from bed to chair and chair to bed with modified independence using the least restrictive device within 7 day(s). 2.  Patient will perform sit to stand with modified independence within 7 day(s). 4.  Patient will ambulate with modified independence for 200 feet with the least restrictive device within 7 day(s). 5.  Patient will ascend/descend 5 stairs with 1 handrail(s) with modified independence within 7 day(s). PHYSICAL THERAPY EVALUATION  Patient: Inessa Cooper (57 y.o. male)  Date: 3/8/2022  Primary Diagnosis: Primary osteoarthritis of right knee [M17.11]  Procedure(s) (LRB):  RIGHT TOTAL KNEE ARTHROPLASTY (N/A) Day of Surgery   Precautions: fall risk       ASSESSMENT  Based on the objective data described below, the patient presents with pain, pain limiting function, weakness of operative leg, . Current Level of Function Impacting Discharge (mobility/balance): pain prohibits ambulation    Functional Outcome Measure: The patient scored 55/100 on the barthel index outcome measure which is indicative of partial dependence. This is expected to improve with opportunities to be functionally mobile. Other factors to consider for discharge: wife is an RN, high prior level of function     Patient will benefit from skilled therapy intervention to address the above noted impairments. PLAN :  Recommendations and Planned Interventions: gait training and therapeutic exercises      Frequency/Duration: Patient will be followed by physical therapy: once or twice daily for 4- 6 times a week to address goals.     Recommendation for discharge: (in order for the patient to meet his/her long term goals)  Outpatient physical therapy follow up recommended for strength, and rom when medically approved    This discharge recommendation:  Has not yet been discussed the attending provider and/or case management    IF patient discharges home will need the following DME: rolling walker         SUBJECTIVE:   Patient stated Sue Ac was ready to try PT.    OBJECTIVE DATA SUMMARY:   HISTORY:    Past Medical History:   Diagnosis Date    Acute [address] 01/09/2016    VF arrest/lytics/[facility]/cath/no PCI    Asthma     Cervical radiculopathy     Chronic obstructive pulmonary disease ([facility]     Chronic pain     Coronary heart disease     Diverticulosis     GERD (gastroesophageal reflux disease)     Hypertension     Pneumonia     PTSD (post-traumatic stress disorder)     Shingles     Tubular adenoma      Past Surgical History:   Procedure Laterality Date    HX KNEE ARTHROSCOPY Right     HX ORTHOPAEDIC      removed a mass from right palm oct 13 2017right wrist fxr repair 2016 dec    HX [address] Environment: Private residence  # Steps to Enter: 4  One/Two Story Residence: One story  Living Alone: No  Support Systems: Spouse/Significant Other (Fiance)  Patient Expects to be Discharged to[de-identified] Home with home health  Current DME Used/Available at Home: None  Tub or Shower Type: Shower    EXAMINATION/PRESENTATION/DECISION MAKING:   Critical Behavior:  Neurologic State: Alert  Orientation Level: Oriented X4  Cognition: Follows commands,Appropriate decision making   Range Of Motion:  AROM: Within functional limits (BUE)                       Strength:    Strength:  Within functional limits (BUE)    Right dorsiflexion is less than fair  Right quad and glut are activated and demonstrate appropriate isometric                    Tone & Sensation:       Normotonic with intact protective sensation Sensation: Intact (BUE but only deep pressure RLE)               Coordination:  Coordination: Within functional limits (BUE)  Vision:      Functional Mobility:  Bed Mobility:  Rolling: Modified independent  Supine to Sit: Modified independent  Sit to Supine: Independent  Scooting: Modified independent  Transfers:  Sit to Stand: Contact guard assistance  Stand to Sit: Contact guard assistance  Stand Pivot Transfers: Contact guard assistance                    Balance:   Sitting: Intact  Standing: Impaired  Standing - Static: Good  Standing - Dynamic : Fair          Therapeutic Exercises: Ankle pumps, glut sets, quad sets         Physical Therapy Evaluation Charge Determination   History Examination Presentation Decision-Making   HIGH Complexity :3+ comorbidities / personal factors will impact the outcome/ POC  LOW Complexity : 1-2 Standardized tests and measures addressing body structure, function, activity limitation and / or participation in recreation  LOW Complexity : Stable, uncomplicated  LOW Complexity : FOTO score of       Based on the above components, the patient evaluation is determined to be of the following complexity level: LOW     Pain Ratin/10    Activity Tolerance:   Fair    After treatment patient left in no apparent distress:   Sitting in chair and Call bell within reach    COMMUNICATION/EDUCATION:   The patients plan of care was discussed with: Registered nurse. Patient/family have participated as able in goal setting and plan of care.     Thank you for this referral.  Verónica Bolden, PT   Time Calculation: 26 mins

## 2022-03-08 NOTE — ROUTINE PROCESS
TRANSFER - IN REPORT:    Verbal report received from Madeline Wasserman RN(name) on Charo Tahir.  being received from [employer]) for routine post - op      Report consisted of patients Situation, Background, Assessment and   Recommendations(SBAR). Information from the following report(s) SBAR, OR Summary, Procedure Summary and MAR was reviewed with the receiving nurse. Opportunity for questions and clarification was provided. Assessment completed upon patients arrival to unit and care assumed.

## 2022-03-08 NOTE — ANESTHESIA PROCEDURE NOTES
Peripheral Block    Start time: 3/8/2022 7:48 AM  End time: 3/8/2022 7:56 AM  Performed by: Sean Morrison MD  Authorized by: Sean Morrison MD       Pre-procedure: Indications: at surgeon's request and post-op pain management    Preanesthetic Checklist: patient identified, risks and benefits discussed, site marked, timeout performed, anesthesia consent given and patient being monitored    Timeout Time: 07:48 EST          Block Type:   Block Type:   Adductor canal block  Laterality:  Right  Monitoring:  Standard ASA monitoring, responsive to questions, continuous pulse ox, oxygen, frequent vital sign checks and heart rate  Injection Technique:  Single shot  Procedures: ultrasound guided    Patient Position: seated  Prep: chlorhexidine    Location:  Mid thigh  Needle Type:  Pajunk  Needle Gauge:  21 G  Needle Localization:  Ultrasound guidance  Medication Injected:  Ropivacaine (NAROPIN) 2 mg/mL (0.2 %) injection, 60 mg  dexamethasone (DECADRON) 4 mg/mL injection, 8 mg  Med Admin Time: 3/8/2022 7:50 AM    Assessment:  Number of attempts:  1  Injection Assessment:  Incremental injection every 5 mL, no paresthesia, ultrasound image on chart, local visualized surrounding nerve on ultrasound, negative aspiration for blood, no intravascular symptoms and low pressure verified by pressure monitor  Patient tolerance:  Patient tolerated the procedure well with no immediate complications  Precedex included in block injectate

## 2022-03-08 NOTE — PROGRESS NOTES
Progress Note  Date:3/8/2022       Room:[facility] [place]  Patient Zachary Linton. YOB: 1965     Age:56 y.o. Subjective    Subjective: Activity level: Impaired due to pain. Pain:  He complains of pain that is moderate. Pain is partially controlled. Dizzy postop trying to get to chair. Mildly hypotensive. Required dilaudid IV  Review of Systems Pain right knee  Objective         Vitals Last 24 Hours:  TEMPERATURE:  Temp  Av.6 °F (36.4 °C)  Min: 97.3 °F (36.3 °C)  Max: 98 °F (36.7 °C)  RESPIRATIONS RANGE: Resp  Av.1  Min: 10  Max: 17  PULSE OXIMETRY RANGE: SpO2  Av.7 %  Min: 94 %  Max: 100 %  PULSE RANGE: Pulse  Av.3  Min: 65  Max: 85  BLOOD PRESSURE RANGE: Systolic (18FSD), ZYU:103 , Min:101 , QIX:628   ; Diastolic (39IAT), XTE:80, Min:61, Max:100      I/O (24Hr): Intake/Output Summary (Last 24 hours) at 3/8/2022 1420  Last data filed at 3/8/2022 1243  Gross per 24 hour   Intake 1547 ml   Output --   Net 1547 ml     Objective:  General Appearance:  Uncomfortable. Vital signs: (most recent): Blood pressure 108/64, pulse 73, temperature 97.3 °F (36.3 °C), resp. rate 16, height 6' 2\" (1.88 m), weight 83.5 kg (184 lb), SpO2 97 %. HEENT: Normal HEENT exam.    Lungs:  Normal effort and normal respiratory rate. Heart: Normal rate. Abdomen: Abdomen is soft. Extremities: Decreased range of motion. Pulses: Distal pulses are intact. Neurological: Patient is alert and oriented to person, place and time. Skin:  Warm and dry. Labs/Imaging/Diagnostics    Labs:  CBC:No results for input(s): WBC, RBC, HGB, HCT, MCV, RDW, PLT, HGBEXT, HCTEXT, PLTEXT in the last 72 hours. CHEMISTRIES:No results for input(s): NA, K, CL, CO2, BUN, CREA, CA, PHOS, MG in the last 72 hours. No lab exists for component: GLUCOSEPT/INR:No results for input(s): INR, INREXT in the last 72 hours. No lab exists for component: PROTIME  APTT:No results for input(s):  APTT in the last 72 hours. LIVER PROFILE:No results for input(s): AST, ALT in the last 72 hours. No lab exists for component: ANISA SanchezPHOS  Lab Results   Component Value Date/Time    ALT (SGPT) 20 04/26/2021 02:24 PM    AST (SGOT) 13 (L) 04/26/2021 02:24 PM    Alk. phosphatase 60 04/26/2021 02:24 PM    Bilirubin, total 0.3 04/26/2021 02:24 PM       Imaging Last 24 Hours:  XR KNEE RT MAX 2 VWS    Result Date: 3/8/2022  EXAM: XR KNEE RT MAX 2 VWS INDICATION: postop. COMPARISON: Right knee dated 9/9/2018 FINDINGS: AP and crosstable lateral views of the right knee were obtained. There has been recent placement of a knee prosthesis. Metallic skin staples are noted anteriorly. Recent placement of a knee prosthesis. Assessment//Plan           Patient Active Problem List    Diagnosis Date Noted    Primary osteoarthritis of right knee 03/08/2022    Tubular adenoma     Cervical radiculopathy     Diverticulosis     Tricompartment osteoarthritis of both knees 11/01/2018    Chronic pain following surgery or procedure 03/11/2017    Closed head injury 08/09/2016    CAD (coronary artery disease) 02/04/2016    MI ([facility] infarction) [address] [address] 02/04/2016    Essential hypertension 02/04/2016    COPD (chronic obstructive pulmonary disease) ([facility] 02/04/2016    Dyslipidemia 02/04/2016    GERD (gastroesophageal reflux disease) 02/04/2016    Asthma 02/23/2010     Assessment:  (A: Stable post-op. Hx of prior opiate dependency which will likely make pain mo difficult to control. P: Change norco to percocet due to intolerance. Continue PT today. Looks a little over- medicated on Dilaudid 1mg. (falling asleep while talking), will drop to No flowsheet data found. Mg. Also pressure dropped after dilaudid  Dressing dry. Encourage PO pain meds. ).            Electronically signed by Fadumo Chopra MD on 3/8/2022 at 2:20 PM

## 2022-03-08 NOTE — OP NOTES
3/8/2022  OPERATIVE REPORT      PREOPERATIVE DIAGNOSIS: Osteoarthritis, right knee. POSTOPERATIVE DIAGNOSIS: Osteoarthritis, right knee. OPERATIVE PROCEDURE: right total knee replacement. SURGEON: Taylor Jacobs MD    ASSISTANT SURGEON: Ramiro    ANESTHESIA: regional/lma    INDICATIONS: : A 60 y.o.  male  with end stage osteoarthritis to the right knee, not responsive to conservative management. COMPLICATIONS:None    PROCEDURE: The patient was taken to the operating room, underwent  placement of anesthesia. The knee and leg were prepped and  draped in the usual fashion. The leg was exsanguinated with the Esmarch  bandage and tourniquet inflated to 350 mmHg. A longitudinal midline  incision made down through skin and subcutaneous tissue. A medial  parapatellar arthrotomy was performed, and extended proximally along the  medial border of the quadriceps tendon, distally along the medial border of  the insertion of the patella tendon. Medial release was performed. Retropatellar fat pad was sharply excised. The patella was subluxated  laterally, the knee was flexed to 90 degrees. Drill was used to enter the  intramedullary canal of the distal femur. The 5 degree intramedullary guide  was applied and the distal femoral cut was performed. The femur was sized  to a 5. A 5 cutting block was applied, and the anterior, posterior,  chamfer cuts were performed. The extramedullary tibial guide was applied, and the tibia was cut in neutral alignment. The tibia was sized to a 5. Knee was balanced to varus  and valgus stress. Flexion and extension gaps were equal. Trial reduction  was performed, using 10 mm insert. Excellent range of motion and stability  were noted. The patella was everted, and the patella was cut using   Patella cutting guide. Patella was sized to a 41. Drill holes were placed, and patella  button applied. The patella tracked normally.  All trial components were  removed, and surfaces were prepared using drill and punch. All residual  meniscal tissue was sharply excised. Hemostasis was obtained using Bovie  apparatus. Tibia and patella were cemented in place, taking care to remove all  excess cement. The femoral component was pressfit into place. The knee was maintained in full extension while the cement hardened. The tourniquet was let down after a total tourniquet time of 90 minutes. Hemostasis was obtained using the Bovie apparatus. The joint was extensively irrigated with antibiotic solution. The arthrotomy was repaired  using #2 Vicryl in interrupted figure-of-eight fashion. Subcutaneous tissue  was approximated using 2-0 Vicryl in interrupted fashion. Skin edges were  approximated using stapling apparatus. Joint was infiltrated with 60 mL of  0.5% Marcaine with epinephrine and Toradol. Bulky sterile dressing was applied, and the  patient was transferred to recovery room in stable condition. There were no  complications. ESTIMATED BLOOD LOSS: 30 cc.     SPECIMEN: Judi Ortega M.D.  3/8/2022  10:17 AM

## 2022-03-09 VITALS
OXYGEN SATURATION: 99 % | SYSTOLIC BLOOD PRESSURE: 142 MMHG | HEART RATE: 76 BPM | WEIGHT: 184 LBS | RESPIRATION RATE: 18 BRPM | BODY MASS INDEX: 23.61 KG/M2 | HEIGHT: 74 IN | DIASTOLIC BLOOD PRESSURE: 81 MMHG | TEMPERATURE: 97.2 F

## 2022-03-09 PROBLEM — M17.11 PRIMARY OSTEOARTHRITIS OF RIGHT KNEE: Status: RESOLVED | Noted: 2022-03-08 | Resolved: 2022-03-09

## 2022-03-09 PROCEDURE — 77030027138 HC INCENT SPIROMETER -A

## 2022-03-09 PROCEDURE — 74011000250 HC RX REV CODE- 250: Performed by: ORTHOPAEDIC SURGERY

## 2022-03-09 PROCEDURE — 97116 GAIT TRAINING THERAPY: CPT

## 2022-03-09 PROCEDURE — 74011250637 HC RX REV CODE- 250/637: Performed by: ORTHOPAEDIC SURGERY

## 2022-03-09 PROCEDURE — 74011250636 HC RX REV CODE- 250/636: Performed by: ORTHOPAEDIC SURGERY

## 2022-03-09 PROCEDURE — 74011000258 HC RX REV CODE- 258: Performed by: ORTHOPAEDIC SURGERY

## 2022-03-09 RX ORDER — OXYCODONE AND ACETAMINOPHEN 5; 325 MG/1; MG/1
1 TABLET ORAL
Qty: 20 TABLET | Refills: 0 | Status: SHIPPED | OUTPATIENT
Start: 2022-03-09 | End: 2022-03-12

## 2022-03-09 RX ORDER — GUAIFENESIN 100 MG/5ML
81 LIQUID (ML) ORAL 2 TIMES DAILY
Qty: 40 TABLET | Refills: 2 | Status: SHIPPED | OUTPATIENT
Start: 2022-03-09

## 2022-03-09 RX ORDER — NAPROXEN 500 MG/1
500 TABLET ORAL 2 TIMES DAILY WITH MEALS
Qty: 40 TABLET | Refills: 2 | Status: SHIPPED | OUTPATIENT
Start: 2022-03-09

## 2022-03-09 RX ADMIN — OXYCODONE AND ACETAMINOPHEN 1 TABLET: 5; 325 TABLET ORAL at 00:52

## 2022-03-09 RX ADMIN — CEFAZOLIN 2 G: 10 INJECTION, POWDER, FOR SOLUTION INTRAVENOUS at 00:44

## 2022-03-09 RX ADMIN — ATORVASTATIN CALCIUM 80 MG: 40 TABLET, FILM COATED ORAL at 09:14

## 2022-03-09 RX ADMIN — ASPIRIN 81 MG CHEWABLE TABLET 81 MG: 81 TABLET CHEWABLE at 09:15

## 2022-03-09 RX ADMIN — OXYCODONE AND ACETAMINOPHEN 1 TABLET: 5; 325 TABLET ORAL at 05:38

## 2022-03-09 RX ADMIN — HYDROMORPHONE HYDROCHLORIDE 0.5 MG: 1 INJECTION, SOLUTION INTRAMUSCULAR; INTRAVENOUS; SUBCUTANEOUS at 02:55

## 2022-03-09 RX ADMIN — OXYCODONE AND ACETAMINOPHEN 1 TABLET: 5; 325 TABLET ORAL at 09:14

## 2022-03-09 RX ADMIN — SODIUM CHLORIDE, PRESERVATIVE FREE 10 ML: 5 INJECTION INTRAVENOUS at 07:55

## 2022-03-09 RX ADMIN — LISINOPRIL 20 MG: 20 TABLET ORAL at 09:15

## 2022-03-09 NOTE — DISCHARGE SUMMARY
Discharge Summary     Patient: Bobbi Anna MRN: [id number]  SSN: [id number]    YOB: 1965  Age: 60 y.o. Sex: male       Admit Date: 3/8/2022    Discharge Date: 3/9/2022      Admission Diagnoses: Primary osteoarthritis of right knee [M17.11]    Discharge Diagnoses:   Problem List as of 3/9/2022 Date Reviewed: 3/8/2022          Codes Class Noted - Resolved    Tubular adenoma ICD-10-CM: D36.9  ICD-9-CM: 229.9  Unknown - Present        Cervical radiculopathy ICD-10-CM: M54.12  ICD-9-CM: 723.4  Unknown - Present        Diverticulosis ICD-10-CM: K57.90  ICD-9-CM: 562.10  Unknown - Present        Tricompartment osteoarthritis of both knees ICD-10-CM: M17.0  ICD-9-CM: 715.96  11/1/2018 - Present    Overview Signed 6/16/2020 12:45 PM by Reg Sánchez MD     Overview:   Added automatically from request for surgery [id number]             Chronic pain following surgery or procedure ICD-10-CM: G89.28  ICD-9-CM: 338.28  3/11/2017 - Present        Closed head injury ICD-10-CM: S09. 90XA  ICD-9-CM: 959.01  8/9/2016 - Present        CAD (coronary artery disease) ICD-10-CM: I25.10  ICD-9-CM: 414.00  2/4/2016 - Present        MI (myocardial infarction) [facility]) ICD-10-CM: I21.9  ICD-9-CM: 410.90  2/4/2016 - Present    Overview Signed 2/4/2016  9:23 AM by Mitzi Chahal MD     Ant STEMI 1/9/16--VF arrest, lytics, xfer [facility], PCI             Essential hypertension ICD-10-CM: I10  ICD-9-CM: 401.9  2/4/2016 - Present        COPD (chronic obstructive pulmonary disease) ([facility] [address] ICD-10-CM: J44.9  ICD-9-CM: 496  2/4/2016 - Present        Dyslipidemia ICD-10-CM: E78.5  ICD-9-CM: 272.4  2/4/2016 - Present        GERD (gastroesophageal reflux disease) ICD-10-CM: K21.9  ICD-9-CM: 530.81  2/4/2016 - Present        Asthma ICD-10-CM: J45.909  ICD-9-CM: 493.90  2/23/2010 - Present        RESOLVED: Primary osteoarthritis of right knee ICD-10-CM: M17.11  ICD-9-CM: 715.16  3/8/2022 - 3/9/2022        * (Principal) RESOLVED: Primary osteoarthritis of right knee ICD-10-CM: M17.11  ICD-9-CM: 715.16  2/20/2022 - 3/8/2022               Discharge Condition: Good    Hospital Course: Had right TKA yesterday. Having moderate pain today but feels that he is ready for discharge. N/V stable, xray looks good, dressing is dry and pulses are intact. Consults: None    Disposition: home    Discharge Medications:   Current Discharge Medication List      START taking these medications    Details   aspirin 81 mg chewable tablet Take 1 Tablet by mouth two (2) times a day. Qty: 40 Tablet, Refills: 2    Associated Diagnoses: Primary osteoarthritis of right knee      oxyCODONE-acetaminophen (PERCOCET) 5-325 mg per tablet Take 1 Tablet by mouth every four (4) hours as needed for Pain for up to 3 days. Max Daily Amount: 6 Tablets. Qty: 20 Tablet, Refills: 0    Associated Diagnoses: Primary osteoarthritis of right knee      naproxen (NAPROSYN) 500 mg tablet Take 1 Tablet by mouth two (2) times daily (with meals). Qty: 40 Tablet, Refills: 2    Associated Diagnoses: Primary osteoarthritis of right knee         CONTINUE these medications which have NOT CHANGED    Details   traZODone (DESYREL) 150 mg tablet Take 2 Tabs by mouth nightly. Qty: 60 Tab, Refills: 3    Associated Diagnoses: Insomnia, unspecified type; Anxiety      diclofenac (VOLTAREN) 1 % gel Apply 2 g to affected area. albuterol (PROVENTIL HFA, VENTOLIN HFA, PROAIR HFA) 90 mcg/actuation inhaler Take 1 Puff by inhalation every six (6) hours as needed for Wheezing. Qty: 1 Inhaler, Refills: 5      albuterol (PROVENTIL VENTOLIN) 2.5 mg /3 mL (0.083 %) nebulizer solution 3 mL by Nebulization route as needed. Qty: 2 Package, Refills: 0      atorvastatin (LIPITOR) 80 mg tablet Take 1 Tab by mouth daily. Qty: 30 Tab, Refills: 0    Associated Diagnoses: Coronary artery disease due to lipid rich plaque      lisinopril (PRINIVIL, ZESTRIL) 20 mg tablet Take 1 Tab by mouth daily.   Qty: 30 Tab, Refills: 0 Associated Diagnoses: Coronary artery disease involving native coronary artery of native heart with angina pectoris (HCC); SOB (shortness of breath) on exertion      Blood Pressure Monitor kit Use as directed  Qty: 1 Kit, Refills: 0    Associated Diagnoses: Coronary artery disease due to lipid rich plaque; Essential hypertension         STOP taking these medications       cephALEXin (Keflex) 500 mg capsule Comments:   Reason for Stopping:         pregabalin (LYRICA) 150 mg capsule Comments:   Reason for Stopping:         promethazine-codeine (PHENERGAN with CODEINE) 6.25-10 mg/5 mL syrup Comments:   Reason for Stopping:         ondansetron (ZOFRAN ODT) 4 mg disintegrating tablet Comments:   Reason for Stopping:         polyethylene glycol (Miralax) 17 gram/dose powder Comments:   Reason for Stopping:         ipratropium (ATROVENT HFA) 17 mcg/actuation inhaler Comments:   Reason for Stopping:         ipratropium (ATROVENT) 0.02 % soln Comments:   Reason for Stopping:         budesonide-formoterol (SYMBICORT) 160-4.5 mcg/actuation HFA inhaler Comments:   Reason for Stopping:         nitroglycerin (NITROSTAT) 0.4 mg SL tablet Comments:   Reason for Stopping:               Activity: Activity as tolerated  Diet: Regular Diet  Wound Care: Keep wound clean and dry    Follow-up Appointments   Procedures    FOLLOW UP VISIT Appointment in: Two Weeks     Standing Status:   Standing     Number of Occurrences:   1     Order Specific Question:   Appointment in     Answer:    Two Weeks       Signed By: Louie Ruiz MD     March 9, 2022

## 2022-03-09 NOTE — DISCHARGE INSTRUCTIONS
Giselle Bean MD    After [address]:    Discharge Instructions Knee Replacement- [facility]    Patient Name  Charo Hernandez Date of procedure  3/8/2022    Procedure  Procedure(s):  RIGHT TOTAL KNEE ARTHROPLASTY  Surgeon  Surgeon(s) and Role:     * William Araujo MD - Primary  Date of discharge: No discharge date for patient encounter. PCP: Cecelia Linn MD    Follow up appointments   Follow up with  [facility] in 2 weeks. Call [phone] to make an appointment.  If home health has been arranged for you the agency will contact you to arrange dates/times for visits. Please call them if you do not hear from them within 24 hours after you are discharged    When to call your Orthopaedic Surgeon: Call [phone]. If you call after 5pm or on a weekend, the on call physician will be contacted   Unrelieved pain   Signs of infection-if your incision is red; continues to have drainage; drainage has a foul odor or if you have a persistent fever over 101 degrees   Signs of a blood clot in your leg-calf pain, tenderness, redness, swelling of lower leg    When to call your Primary Care Physician:   Concerns about medical conditions such as diabetes, high blood pressure, asthma, congestive heart failure   Call if blood sugars are elevated, persistent headache or dizziness, coughing or congestion, constipation or diarrhea, burning with urination, abnormal heart rate    When to call [id number] go to the nearest emergency room   Acute onset of chest pain, shortness of breath, difficulty breathing      Activity   Weight bearing as tolerated with walker or crutches.  Complete your Home Exercise Program daily as instructed by your therapist.    Lesle Norton up every one hour and walk (except at night when sleeping)   Do not drive or operate heavy machinery      Incision Care   You will have staples in your knee incision; they will be removed at the surgeons office visit in 2 weeks. Leave the occlusive dressing in place for at least 7 days or for 2 weeks if it remains intact and not causing irritation.  You may shower with the dressing in place. After 7 days, if the dressing has been removed, you may shower and get your incision wet but do not submerge your incision under water in a bath tub, hot tub or swimming pool for 6 weeks after surgery. Preventing blood clots   Take Aspirin 81 mg. twice a day as a blood thinner    Pain management   Take pain medication as prescribed; decrease the amount you use as your pain lessens   Avoid alcoholic beverages while taking pain medication   Please be aware that many medications contain Tylenol. We do not want you to over medicate so please read the information below as a guide. Do not take more than 4 Grams of Tylenol in a 24 hour period. (There are 1000 milligrams in one Gram)  o Percocet contains 325 mg of Tylenol per tablet (do not take more than 12 tablets in 24 hours)  o Norco contains 325 mg of Tylenol per tablet (do not take more than 12 tablets in 24 hours).  You may place an ice bag on your knee for 15-20 minutes after exercising              May use naproxen 500 mg twice a day every 12 hours combined with tylenol at recommended doses for routine pain management. Diet   Resume usual diet; drink plenty of fluids; eat foods high in fiber   You may want to take a stool softener (such as Senokot-S or Colace) to prevent constipation while you are taking pain medication.   If constipation occurs, take a laxative (such as Dulcolax tablets, Milk of Magnesia, or a suppository)    [facility] Protocol (to be followed by [address])  Nursing-per physicians order   Complete head to toe assessment, vital signs   Staples will be removed in the surgeons office at 2 week visit   Medication reconciliation   Review pain management   Manage chronic medical conditions    Physical Therapy-per physicians order  Weight bearing status:           ? Mobility Status:  Supine to Sit: Modified independent  Sit to Stand: Contact guard assistance  Sit to Supine: Independent     Gait:              ADL status overall composite:                Physical Therapy   Assessment and evaluation-bed mobility; functional transfers (bed, chair, bathroom, stairs); ambulation with equipment, car transfers, shower transfers, safety and ability to get out of house in the event of an emergency   Review weight bearing as tolerated, wean from walker or crutches as tolerated   Discuss pain management   Review how to do ADLs. Home Exercise Program  DISCHARGE SUMMARY from Nurse    PATIENT INSTRUCTIONS:    After general anesthesia or intravenous sedation, for 24 hours or while taking prescription Narcotics:  · Limit your activities  · Do not drive and operate hazardous machinery  · Do not make important personal or business decisions  · Do  not drink alcoholic beverages  · If you have not urinated within 8 hours after discharge, please contact your surgeon on call. Report the following to your surgeon:  · Excessive pain, swelling, redness or odor of or around the surgical area  · Temperature over 100.5  · Nausea and vomiting lasting longer than 4 hours or if unable to take medications  · Any signs of decreased circulation or nerve impairment to extremity: change in color, persistent  numbness, tingling, coldness or increase pain  · Any questions    What to do at Home:  Recommended activity: PT/OT Eval and Treat and per Dr. Liao Barefoot instructions,     If you experience any of the following symptoms fever, reness or increased swelling, purulent drainage, etc, please follow up with Dr. Dewayne Chen or come to ED. *  Please give a list of your current medications to your Primary Care Provider. *  Please update this list whenever your medications are discontinued, doses are      changed, or new medications (including over-the-counter products) are added.     * Please carry medication information at all times in case of emergency situations. These are general instructions for a healthy lifestyle:    No smoking/ No tobacco products/ Avoid exposure to second hand smoke  Surgeon General's Warning:  Quitting smoking now greatly reduces serious risk to your health. Obesity, smoking, and sedentary lifestyle greatly increases your risk for illness    A healthy diet, regular physical exercise & weight monitoring are important for maintaining a healthy lifestyle    You may be retaining fluid if you have a history of heart failure or if you experience any of the following symptoms:  Weight gain of 3 pounds or more overnight or 5 pounds in a week, increased swelling in our hands or feet or shortness of breath while lying flat in bed. Please call your doctor as soon as you notice any of these symptoms; do not wait until your next office visit. The discharge information has been reviewed with the patient. The patient verbalized understanding. Discharge medications reviewed with the patient and appropriate educational materials and side effects teaching were provided.   ___________________________________________________________________________________________________________________________________

## 2022-03-09 NOTE — PROGRESS NOTES
Physical Therapy Goals  Initiated 3/8/2022      1. Patient will transfer from bed to chair and chair to bed with modified independence using the least restrictive device within 7 day(s). 2.  Patient will perform sit to stand with modified independence within 7 day(s). 4.  Patient will ambulate with modified independence for 200 feet with the least restrictive device within 7 day(s). 5.  Patient will ascend/descend 5 stairs with 1 handrail(s) with modified independence within 7 day(s). PHYSICAL THERAPY TREATMENT  Patient: Reyes Garden. (57 y.o. male)  Date: 3/9/2022  Diagnosis: Primary osteoarthritis of right knee [M17.11] Primary osteoarthritis of right knee  Procedure(s) (LRB):  RIGHT TOTAL KNEE ARTHROPLASTY (N/A) 1 Day Post-Op  Precautions:    Chart, physical therapy assessment, plan of care and goals were reviewed. ASSESSMENT  Patient continues with skilled PT services and is progressing towards goals. This is evidenced by an increase in amb and stair climbing. He demonstrates impulsive behavior and requires ues to slow own to permit wc lock, leg rest removal, proximity to stairs. He was able to recite 2/3 of previously instructed exercises. Delia Laura He was contacted enroute to out of the building s/p dc instructions and written dc by MD  Current Level of Function Impacting Discharge (mobility/balance): none    Other factors to consider for discharge: impulsive behaviors         PLAN :  Patient continues to benefit from skilled intervention to address the above impairments. Continue treatment per established plan of care. to address goals.     Recommendation for discharge: (in order for the patient to meet his/her long term goals)  Outpatient physical therapy follow up recommended for rom, strengthening and gait training    This discharge recommendation:  Has not yet been discussed the attending provider and/or case management    IF patient discharges home will need the following DME: rolling walker SUBJECTIVE:   Patient stated Bailey Ferreira was on his way home.     OBJECTIVE DATA SUMMARY:   Critical Behavior:  Neurologic State: Alert  Orientation Level: Oriented X4  Cognition: Poor safety awareness,Follows commands,Impulsive     Functional Mobility Training:    Transfers:  Sit to Stand: Stand-by assistance; Other (comment) (hand placement cues)  Stand to Sit: Stand-by assistance; Other (comment) (hand placement cues)        Balance:sitting is intact                 Supine is impaired with occasional support dynamically     Ambulation/Gait Training:  Distance (ft): 125 Feet (ft)  Assistive Device: Walker, rolling        Gait Description (WDL): Exceptions to WDL     Right Side Weight Bearing: As tolerated        Stance: Right decreased  Speed/Roya: Other (comment) (appropriate)                      Stairs:  Number of Stairs Trained: 5  Stairs - Level of Assistance: Contact guard assistance   Rail Use: Right     Therapeutic Exercises:   Verbal review of 3/3 ex previously instructed with pt recall of 2/3 and cues for final ex        Activity Tolerance:   Fair    After treatment patient left in no apparent distress:   Pt transported to car and assisted to it for transport home    COMMUNICATION/COLLABORATION:   The patients plan of care was discussed with: Registered nurse.      Levi Luna, PT   Time Calculation: 24 mins

## 2022-03-09 NOTE — ROUTINE PROCESS
Bedside and Verbal shift change report given to José Miguel Carbajal RN (oncoming nurse) by Kika Hutchins RN (offgoing nurse). Report included the following information SBAR, OR Summary, MAR and Recent Results. Echeverria score 1  Bed/chair alarm is not in use. If in use it is set at the highest volume.     SL  Patient Vitals for the past 12 hrs:   Temp Pulse Resp BP SpO2   03/08/22 1740 98.1 °F (36.7 °C) 82 16 122/75 98 %   03/08/22 1640 98.3 °F (36.8 °C) 81 16 111/68 97 %   03/08/22 1540 98.3 °F (36.8 °C) 80 16 111/85 99 %   03/08/22 1440 98.1 °F (36.7 °C) 80 16 108/73 97 %   03/08/22 1340 -- 73 16 108/64 97 %   03/08/22 1310 -- 73 16 101/61 --   03/08/22 1244 -- -- -- 117/75 --   03/08/22 1240 97.3 °F (36.3 °C) 80 16 (!) 141/83 96 %   03/08/22 1210 -- 81 16 122/89 99 %   03/08/22 1140 97.6 °F (36.4 °C) 80 16 137/88 100 %   03/08/22 1125 -- 72 16 138/82 99 %   03/08/22 1113 -- 74 16 (!) 156/94 97 %   03/08/22 1104 97.8 °F (36.6 °C) 71 16 (!) 131/94 98 %   03/08/22 1051 -- 69 13 -- 99 %   03/08/22 1050 -- 68 12 (!) 157/94 97 %   03/08/22 1046 -- 69 17 -- 100 %   03/08/22 1045 -- 71 14 137/89 99 %   03/08/22 1041 -- 70 15 -- 99 %   03/08/22 1040 -- 72 10 (!) 135/100 98 %   03/08/22 1036 -- 65 10 -- 100 %   03/08/22 1035 -- 67 10 (!) 146/97 100 %   03/08/22 1031 -- 73 14 -- 94 %   03/08/22 1030 -- 70 12 (!) 157/98 97 %   03/08/22 1026 -- 70 11 -- 96 %   03/08/22 1025 -- 68 12 (!) 160/96 96 %   03/08/22 1022 97.5 °F (36.4 °C) 69 16 (!) 147/100 97 %   03/08/22 1021 -- -- -- (!) 147/100 96 %

## 2022-03-09 NOTE — PROGRESS NOTES
Lungs are clear, but slightly diminished. Mr. Daquan Martinez is usually very active at home. He does mention a history of COPD. Incentive spirometer provided and after instructions were given, he uses it with good technique.

## 2022-03-09 NOTE — PROGRESS NOTES
Bedside shift change report given to [address] [facility] Devang Kirk (oncoming nurse) by Tony Morejon RN (offgoing nurse). Report included the following information Post op status, ortho assessment, SBAR, MAR VS and pain management.

## 2022-03-09 NOTE — PROGRESS NOTES
Problem: Patient Education: Go to Patient Education Activity  Goal: Patient/Family Education  Outcome: Resolved/Met     Problem: Knee Replacement: Post-Op Day 1  Goal: Off Pathway (Use only if patient is Off Pathway)  Outcome: Resolved/Met  Goal: Activity/Safety  Outcome: Resolved/Met  Goal: Diagnostic Test/Procedures  Outcome: Resolved/Met  Goal: Nutrition/Diet  Outcome: Resolved/Met  Goal: Medications  Outcome: Resolved/Met  Goal: Respiratory  Outcome: Resolved/Met  Goal: Treatments/Interventions/Procedures  Outcome: Resolved/Met  Goal: Psychosocial  Outcome: Resolved/Met  Goal: Discharge Planning  Outcome: Resolved/Met  Goal: *Demonstrates progressive activity  Outcome: Resolved/Met  Goal: *Optimal pain control at patient's stated goal  Outcome: Resolved/Met  Goal: *Hemodynamically stable  Outcome: Resolved/Met  Goal: *Discharge plan identified  Outcome: Resolved/Met     Problem: Knee Replacement: Post-Op Day 2  Goal: Off Pathway (Use only if patient is Off Pathway)  Outcome: Resolved/Met  Goal: Activity/Safety  Outcome: Resolved/Met  Goal: Diagnostic Test/Procedures  Outcome: Resolved/Met  Goal: Medications  Outcome: Resolved/Met  Goal: Respiratory  Outcome: Resolved/Met  Goal: Treatments/Interventions/Procedures  Outcome: Resolved/Met  Goal: Psychosocial  Outcome: Resolved/Met  Goal: *Met physical therapy criteria for discharge to the next level of care  Outcome: Resolved/Met  Goal: *Optimal pain control with oral analgesia  Outcome: Resolved/Met  Goal: *Hemodynamically stable  Outcome: Resolved/Met  Goal: *Tolerating diet  Outcome: Resolved/Met  Goal: *Patient verbalizes understanding of discharge instructions  Outcome: Resolved/Met     Problem: Falls - Risk of  Goal: *Absence of Falls  Description: Document Abbi Fall Risk and appropriate interventions in the flowsheet.   Outcome: Resolved/Met     Problem: Patient Education: Go to Patient Education Activity  Goal: Patient/Family Education  Outcome: Resolved/Met     Problem: Pain  Goal: *Control of Pain  Outcome: Resolved/Met     Problem: Patient Education: Go to Patient Education Activity  Goal: Patient/Family Education  Outcome: Resolved/Met     Problem: Hypertension  Goal: *Blood pressure within specified parameters  Outcome: Resolved/Met  Goal: *Fluid volume balance  Outcome: Resolved/Met  Goal: *Labs within defined limits  Outcome: Resolved/Met     Problem: Patient Education: Go to Patient Education Activity  Goal: Patient/Family Education  Outcome: Resolved/Met     Problem: Patient Education: Go to Patient Education Activity  Goal: Patient/Family Education  Outcome: Resolved/Met

## 2022-03-09 NOTE — PROGRESS NOTES
Discharge instructions reviewed with patient and spouse  Personal belongings returned: n/a  Home meds returned: n/a  To front entrance via w/c  Discharged home with  spouse @ 9879    Patient and spouse are able to teach back that he must attend the follow-up visit with Surgeon, and also to  prescriptions at pharmacy. He stopped by physical therapy department to practice with stairs, and then was taken to car for discharge at 0955.

## 2022-03-09 NOTE — PROGRESS NOTES
Care Management Interventions  PCP Verified by CM: Yes Melody Thomas )  Last Visit to PCP: 09/24/21  Palliative Care Criteria Met (RRAT>21 & CHF Dx)?: No (No MD order)  Transition of Care Consult (CM Consult): Discharge Planning  MyChart Signup: No  Discharge Durable Medical Equipment: No  Physical Therapy Consult: Yes  Occupational Therapy Consult: Yes  Speech Therapy Consult: No  Support Systems: Spouse/Significant Other (Fiance)  Confirm Follow Up Transport: Family  The Plan for Transition of Care is Related to the Following Treatment Goals : Treat RTKR  The Patient and/or Patient Representative was Provided with a Choice of Provider and Agrees with the Discharge Plan?: Yes  Freedom of Choice List was Provided with Basic Dialogue that Supports the Patient's Individualized Plan of Care/Goals, Treatment Preferences and Shares the Quality Data Associated with the Providers?: Yes  Discharge Location  Patient Expects to be Discharged to[de-identified] Home with outpatient services    Patient is being discharged home today. Patient decided he wants to do outpatient PT through [facility]). Patient was explained the Freedom of Choice form and signed. Copy given to patient and signed original placed in chart. Called Fabi with [facility]. Made jonna for 3/10 at 1:45pm. Patient is aware and agrees with discharge plan. Patient told to call care management for any questions or concerns after discharge. RRAT: 8 LOW     Transition of Care (CARLOS A) Plan:  Home w/outpatient PT     CARLOS A Transportation:       How is patient being transported at discharge? POV      When? Today      Is transport scheduled? N/a     Follow-up appointment and transportation:     PCP? Melody Thomas MD      Specialist? Rod Robertson MD      Who is transporting to the follow-up appointment?  POV      Is transport for follow up appointment scheduled? n/a    Communication plan (with patient/family): Patient is aware and agrees with discharge plan       Who is being called? Patient or Next of Kin? Responsible party? Patient       What number(s) is to be used? [phone]      What service provider is calling for [facility] services? Dr. Leigh Murry office and Dr. Nabeel Barron office       When are they calling?  24--48 hours prior to jonna       Click here to complete [address] including selection of the Healthcare Decision Maker Relationship (ie \"Primary\")  [email or web address]

## 2022-03-14 ENCOUNTER — TRANSCRIBE ORDER (OUTPATIENT)
Dept: SCHEDULING | Age: 57
End: 2022-03-14

## 2022-03-14 DIAGNOSIS — M25.561 RIGHT KNEE PAIN: Primary | ICD-10-CM

## 2022-03-14 RX ORDER — OXYCODONE AND ACETAMINOPHEN 5; 325 MG/1; MG/1
1 TABLET ORAL
Qty: 20 TABLET | Refills: 0 | Status: SHIPPED | OUTPATIENT
Start: 2022-03-14 | End: 2022-03-17

## 2022-03-16 ENCOUNTER — HOSPITAL ENCOUNTER (OUTPATIENT)
Dept: ULTRASOUND IMAGING | Age: 57
Discharge: HOME | End: 2022-03-16
Attending: ORTHOPAEDIC SURGERY
Payer: MEDICAID

## 2022-03-16 DIAGNOSIS — M25.561 RIGHT KNEE PAIN: ICD-10-CM

## 2022-03-16 PROCEDURE — 93971 EXTREMITY STUDY: CPT

## 2022-03-19 PROBLEM — G89.28 CHRONIC PAIN FOLLOWING SURGERY OR PROCEDURE: Status: ACTIVE | Noted: 2017-03-11

## 2022-03-20 PROBLEM — M17.0 TRICOMPARTMENT OSTEOARTHRITIS OF BOTH KNEES: Status: ACTIVE | Noted: 2018-11-01

## 2022-03-21 RX ORDER — HYDROCODONE BITARTRATE AND ACETAMINOPHEN 5; 325 MG/1; MG/1
1 TABLET ORAL
Qty: 14 TABLET | Refills: 0 | Status: SHIPPED | OUTPATIENT
Start: 2022-03-21 | End: 2022-03-24

## 2022-03-22 ENCOUNTER — HOSPITAL ENCOUNTER (OUTPATIENT)
Dept: GENERAL RADIOLOGY | Age: 57
Discharge: HOME | End: 2022-03-22
Payer: MEDICAID

## 2022-03-22 ENCOUNTER — TRANSCRIBE ORDER (OUTPATIENT)
Dept: REGISTRATION | Age: 57
End: 2022-03-22

## 2022-03-22 DIAGNOSIS — M25.561 RIGHT KNEE PAIN: ICD-10-CM

## 2022-03-22 DIAGNOSIS — M25.561 RIGHT KNEE PAIN: Primary | ICD-10-CM

## 2022-03-22 PROCEDURE — 73560 X-RAY EXAM OF KNEE 1 OR 2: CPT

## 2022-03-22 PROCEDURE — 73562 X-RAY EXAM OF KNEE 3: CPT

## 2022-03-31 RX ORDER — TRAMADOL HYDROCHLORIDE 50 MG/1
50 TABLET ORAL
Qty: 20 TABLET | Refills: 0 | Status: SHIPPED | OUTPATIENT
Start: 2022-03-31 | End: 2022-04-03

## 2022-07-21 ENCOUNTER — HOSPITAL ENCOUNTER (OUTPATIENT)
Dept: GENERAL RADIOLOGY | Age: 57
Discharge: HOME | End: 2022-07-21
Payer: MEDICAID

## 2022-07-21 ENCOUNTER — TRANSCRIBE ORDER (OUTPATIENT)
Dept: REGISTRATION | Age: 57
End: 2022-07-21

## 2022-07-21 DIAGNOSIS — M25.561 RIGHT KNEE PAIN: Primary | ICD-10-CM

## 2022-07-21 DIAGNOSIS — M25.561 RIGHT KNEE PAIN: ICD-10-CM

## 2022-07-21 PROCEDURE — 73564 X-RAY EXAM KNEE 4 OR MORE: CPT

## 2022-07-25 ENCOUNTER — HOSPITAL ENCOUNTER (OUTPATIENT)
Dept: LAB | Age: 57
Discharge: HOME | End: 2022-07-25
Payer: MEDICAID

## 2022-07-25 LAB
APPEARANCE SNV: ABNORMAL
COLOR SNV: ABNORMAL
LYMPHOCYTES NFR SNV MANUAL: 72 % (ref 0–15)
NEUTROPHILS NFR SNV MANUAL: 28 % (ref 0–20)
RBC # SNV: >100 /CU MM
SPECIMEN SOURCE FLD: ABNORMAL
WBC # SNV: 497 /CU MM (ref 0–150)

## 2022-07-25 PROCEDURE — 87205 SMEAR GRAM STAIN: CPT

## 2022-07-25 PROCEDURE — 89050 BODY FLUID CELL COUNT: CPT

## 2022-07-26 ENCOUNTER — HOSPITAL ENCOUNTER (OUTPATIENT)
Dept: LAB | Age: 57
Discharge: HOME | End: 2022-07-26
Payer: MEDICAID

## 2022-07-26 LAB
BASOPHILS # BLD: 0.1 K/UL (ref 0–0.1)
BASOPHILS NFR BLD: 2 % (ref 0–1)
CRP SERPL-MCNC: <0.29 MG/DL (ref 0–0.6)
DIFFERENTIAL METHOD BLD: ABNORMAL
EOSINOPHIL # BLD: 0 K/UL (ref 0–0.4)
EOSINOPHIL NFR BLD: 0 % (ref 0–7)
ERYTHROCYTE [DISTWIDTH] IN BLOOD BY AUTOMATED COUNT: 11.8 % (ref 11.5–14.5)
ERYTHROCYTE [SEDIMENTATION RATE] IN BLOOD: 3 MM/HR (ref 0–20)
HCT VFR BLD AUTO: 40.5 % (ref 36.6–50.3)
HGB BLD-MCNC: 14 G/DL (ref 12.1–17)
IMM GRANULOCYTES # BLD AUTO: 0 K/UL (ref 0–0.04)
IMM GRANULOCYTES NFR BLD AUTO: 0 % (ref 0–0.5)
LYMPHOCYTES # BLD: 2.8 K/UL (ref 0.8–3.5)
LYMPHOCYTES NFR BLD: 43 % (ref 12–49)
MCH RBC QN AUTO: 32.3 PG (ref 26–34)
MCHC RBC AUTO-ENTMCNC: 34.6 G/DL (ref 30–36.5)
MCV RBC AUTO: 93.5 FL (ref 80–99)
MONOCYTES # BLD: 0.2 K/UL (ref 0–1)
MONOCYTES NFR BLD: 3 % (ref 5–13)
NEUTS SEG # BLD: 3.3 K/UL (ref 1.8–8)
NEUTS SEG NFR BLD: 52 % (ref 32–75)
NRBC # BLD: 0 K/UL (ref 0–0.01)
NRBC BLD-RTO: 0 PER 100 WBC
PLATELET # BLD AUTO: 195 K/UL (ref 150–400)
PMV BLD AUTO: 9.9 FL (ref 8.9–12.9)
RBC # BLD AUTO: 4.33 M/UL (ref 4.1–5.7)
RBC MORPH BLD: ABNORMAL
WBC # BLD AUTO: 6.4 K/UL (ref 4.1–11.1)

## 2022-07-26 PROCEDURE — 36415 COLL VENOUS BLD VENIPUNCTURE: CPT

## 2022-07-26 PROCEDURE — 86140 C-REACTIVE PROTEIN: CPT

## 2022-07-26 PROCEDURE — 85025 COMPLETE CBC W/AUTO DIFF WBC: CPT

## 2022-07-26 PROCEDURE — 85652 RBC SED RATE AUTOMATED: CPT

## 2022-07-26 RX ORDER — TRAMADOL HYDROCHLORIDE 50 MG/1
50 TABLET ORAL
Qty: 18 TABLET | Refills: 0 | Status: SHIPPED | OUTPATIENT
Start: 2022-07-26 | End: 2022-07-29

## 2022-07-29 LAB
BACTERIA SPEC CULT: NORMAL
GRAM STN SPEC: NORMAL
GRAM STN SPEC: NORMAL
SERVICE CMNT-IMP: NORMAL

## 2022-08-10 ENCOUNTER — HOSPITAL ENCOUNTER (EMERGENCY)
Age: 57
Discharge: HOME | End: 2022-08-10
Attending: EMERGENCY MEDICINE
Payer: MEDICAID

## 2022-08-10 VITALS
OXYGEN SATURATION: 96 % | DIASTOLIC BLOOD PRESSURE: 88 MMHG | SYSTOLIC BLOOD PRESSURE: 120 MMHG | HEART RATE: 81 BPM | BODY MASS INDEX: 23.62 KG/M2 | RESPIRATION RATE: 20 BRPM | TEMPERATURE: 98.3 F | WEIGHT: 184 LBS

## 2022-08-10 DIAGNOSIS — M25.561 CHRONIC PAIN OF RIGHT KNEE: Primary | ICD-10-CM

## 2022-08-10 DIAGNOSIS — G89.29 CHRONIC PAIN OF RIGHT KNEE: Primary | ICD-10-CM

## 2022-08-10 PROCEDURE — 99283 EMERGENCY DEPT VISIT LOW MDM: CPT

## 2022-08-10 RX ORDER — OXYCODONE HYDROCHLORIDE 5 MG/1
5 TABLET ORAL
Qty: 15 TABLET | Refills: 0 | Status: SHIPPED | OUTPATIENT
Start: 2022-08-10 | End: 2022-08-15

## 2022-08-10 NOTE — ED TRIAGE NOTES
Pt reports pain after RTKR in March 2022. He has seen his ortho that performed the sx 2 times for this and has had fluid removed. He took tramadol last nite.

## 2022-08-16 NOTE — ED PROVIDER NOTES
EMERGENCY DEPARTMENT HISTORY AND PHYSICAL EXAM      Date: 8/10/2022  Patient Name: Bolivar Cook. History of Presenting Illness     Chief Complaint   Patient presents with    Knee Pain       History Provided By: Patient    HPI: Bolivar Carver, 60 y.o. male with PMHx as noted below presents the emergency department chief complaint of chronic knee pain. Patient reports chronic right knee pain since TKA in March. Patient notes that he has had some burning and paresthesias around the knee since the procedure. States that he has had some fluid drained off several times and has seen several different orthopedic doctors about this. Patient presenting today requesting pain medication refill. Patient notes no change in the pain, no swelling, warmth or systemic symptoms have developed. Chronic pain that is been constant since March, worse with weightbearing. Denies any new injuries. PCP: Bailee Montague MD    Current Outpatient Medications   Medication Sig Dispense Refill    aspirin 81 mg chewable tablet Take 1 Tablet by mouth two (2) times a day. 40 Tablet 2    naproxen (NAPROSYN) 500 mg tablet Take 1 Tablet by mouth two (2) times daily (with meals). 40 Tablet 2    diclofenac (VOLTAREN) 1 % gel Apply 2 g to affected area. albuterol (PROVENTIL HFA, VENTOLIN HFA, PROAIR HFA) 90 mcg/actuation inhaler Take 1 Puff by inhalation every six (6) hours as needed for Wheezing. (Patient not taking: Reported on 3/8/2022) 1 Inhaler 5    albuterol (PROVENTIL VENTOLIN) 2.5 mg /3 mL (0.083 %) nebulizer solution 3 mL by Nebulization route as needed. (Patient not taking: Reported on 3/8/2022) 2 Package 0    atorvastatin (LIPITOR) 80 mg tablet Take 1 Tab by mouth daily. 30 Tab 0    lisinopril (PRINIVIL, ZESTRIL) 20 mg tablet Take 1 Tab by mouth daily. 30 Tab 0    traZODone (DESYREL) 150 mg tablet Take 2 Tabs by mouth nightly.  60 Tab 3    Blood Pressure Monitor kit Use as directed 1 Kit 0       Past History     Past Medical History:  Past Medical History:   Diagnosis Date    Acute MI, anterior wall ([address] 01/09/2016    VF arrest/lytics/[facility]/cath/no PCI    Asthma     Cervical radiculopathy     Chronic obstructive pulmonary disease (HCC)     Chronic pain     Coronary heart disease     Diverticulosis     GERD (gastroesophageal reflux disease)     Hypertension     Pneumonia     PTSD (post-traumatic stress disorder)     Shingles     Tubular adenoma        Past Surgical History:  Past Surgical History:   Procedure Laterality Date    HX KNEE ARTHROSCOPY Right     HX ORTHOPAEDIC      removed a mass from right palm oct 13 2017right wrist fxr repair 2016 dec    HX ROTATOR CUFF REPAIR         Family History:  Family History   Family history unknown: Yes       Social History:  Social History     Tobacco Use    Smoking status: Every Day     Packs/day: 0.25     Types: Cigarettes    Smokeless tobacco: Never    Tobacco comments:     4-5 ciggs per day. Substance Use Topics    Alcohol use: Yes     Alcohol/week: 1.0 standard drink     Types: 1 Shots of liquor per week     Comment: social    Drug use: No       Allergies: Allergies   Allergen Reactions    Pcn [Penicillins] Unknown (comments)         Review of Systems   Review of Systems  Constitutional: Negative for fever, chills, and fatigue. Musculoskeletal: Positive knee pain, negative leg pain    Physical Exam   Physical Exam    GENERAL: alert and oriented, no acute distress  EYES: PEERL, No injection, discharge or icterus. ENT: Mucous membranes pink and moist.  NECK: Supple  LUNGS: Airway patent. Non-labored respirations. HEART: Regular rate and rhythm. ABDOMEN: Non-distended  SKIN:  warm, dry  MSK/EXTREMITIES: Without obvious deformity, warmth or erythema.   He does have some diffuse tenderness to the right knee, pain with passive range of motion  NEUROLOGICAL: Alert, oriented      Diagnostic Study Results     Labs -   No results found for this or any previous visit (from the past 12 hour(s)). Radiologic Studies -   No orders to display     CT Results  (Last 48 hours)      None          CXR Results  (Last 48 hours)      None              Medical Decision Making   I, Jeffery Reilly MD am the first provider for this patient and am the attending of record for this patient encounter. I reviewed the vital signs, available nursing notes, past medical history, past surgical history, family history and social history. Vital Signs-Reviewed the patient's vital signs. No data found. Records Reviewed: Nursing Notes and Old Medical Records    Provider Notes (Medical Decision Making): On presentation, the patient is well appearing, in no acute distress with normal vital signs. Patient presenting with chronic right knee pain for several months, requesting medication refill until a follow-up with orthopedics in the coming weeks. There are no changes in the patient's symptoms, no reason to suspect a septic joint or any acute trauma based on his history. Exam is also not consistent with a septic joint. Will provide short course of pain medication and instructed to follow-up with orthopedics. ED Course:   Initial assessment performed. The patients presenting problems have been discussed, and they are in agreement with the care plan formulated and outlined with them. I have encouraged them to ask questions as they arise throughout their visit. [address]  results have been reviewed with him. He has been counseled regarding his diagnosis. He verbally conveys understanding and agreement of the signs, symptoms, diagnosis, treatment and prognosis and additionally agrees to follow up as recommended with Dr. Yanci Lacey MD in 24 - 48 hours. He also agrees with the care-plan and conveys that all of his questions have been answered.   I have also put together some discharge instructions for him that include: 1) educational information regarding their diagnosis, 2) how to care for their diagnosis at home, as well a 3) list of reasons why they would want to return to the ED prior to their follow-up appointment, should their condition change. Disposition:  home    PLAN:  1. Discharge Medication List as of 8/10/2022  1:03 PM        START taking these medications    Details   oxyCODONE IR (Roxicodone) 5 mg immediate release tablet Take 1 Tablet by mouth every six (6) hours as needed for Pain for up to 5 days. Max Daily Amount: 20 mg., Normal, Disp-15 Tablet, R-0           CONTINUE these medications which have NOT CHANGED    Details   aspirin 81 mg chewable tablet Take 1 Tablet by mouth two (2) times a day., Normal, Disp-40 Tablet, R-2      naproxen (NAPROSYN) 500 mg tablet Take 1 Tablet by mouth two (2) times daily (with meals). , Normal, Disp-40 Tablet, R-2      diclofenac (VOLTAREN) 1 % gel Apply 2 g to affected area., Historical Med      albuterol (PROVENTIL HFA, VENTOLIN HFA, PROAIR HFA) 90 mcg/actuation inhaler Take 1 Puff by inhalation every six (6) hours as needed for Wheezing., Normal, Disp-1 Inhaler, R-5      albuterol (PROVENTIL VENTOLIN) 2.5 mg /3 mL (0.083 %) nebulizer solution 3 mL by Nebulization route as needed., Normal, Disp-2 Package, R-0      atorvastatin (LIPITOR) 80 mg tablet Take 1 Tab by mouth daily. , Normal, Disp-30 Tab, R-0      lisinopril (PRINIVIL, ZESTRIL) 20 mg tablet Take 1 Tab by mouth daily. , Normal, Disp-30 Tab, R-0      traZODone (DESYREL) 150 mg tablet Take 2 Tabs by mouth nightly., Normal, Disp-60 Tab, R-3      Blood Pressure Monitor kit Use as directed, Normal, Disp-1 Kit, R-0           2.    Follow-up Information       Follow up With Specialties Details Why Contact Info    Deo Brown MD Family Medicine Schedule an appointment as soon as possible for a visit in 2 days  [address] [facility] Emergency Medicine  If symptoms worsen [address] [place] [address] [address] [address]  [phone]    Karis Rodriguez MD Orthopedic Surgery Go to   [address]  [address] [facility] [address]  [address] [address] [phone]            Return to ED if worse     Diagnosis     Clinical Impression:   1. Chronic pain of right knee        Please note that this dictation was completed with Dragon, computer voice recognition software. Quite often unanticipated grammatical, syntax, homophones, and other interpretive errors are inadvertently transcribed by the computer software. Please disregard these errors. Additionally, please excuse any errors that have escaped final proofreading.

## 2022-10-17 ENCOUNTER — HOSPITAL ENCOUNTER (EMERGENCY)
Age: 57
Discharge: HOME | End: 2022-10-17
Attending: EMERGENCY MEDICINE
Payer: MEDICAID

## 2022-10-17 ENCOUNTER — APPOINTMENT (OUTPATIENT)
Dept: GENERAL RADIOLOGY | Age: 57
End: 2022-10-17
Attending: EMERGENCY MEDICINE
Payer: MEDICAID

## 2022-10-17 VITALS
HEIGHT: 74 IN | RESPIRATION RATE: 18 BRPM | HEART RATE: 79 BPM | TEMPERATURE: 98 F | OXYGEN SATURATION: 98 % | BODY MASS INDEX: 25.03 KG/M2 | WEIGHT: 195 LBS | DIASTOLIC BLOOD PRESSURE: 99 MMHG | SYSTOLIC BLOOD PRESSURE: 134 MMHG

## 2022-10-17 DIAGNOSIS — S70.02XA CONTUSION OF LEFT HIP, INITIAL ENCOUNTER: ICD-10-CM

## 2022-10-17 DIAGNOSIS — S40.012A CONTUSION OF LEFT SHOULDER, INITIAL ENCOUNTER: Primary | ICD-10-CM

## 2022-10-17 PROCEDURE — 73030 X-RAY EXAM OF SHOULDER: CPT

## 2022-10-17 PROCEDURE — 99283 EMERGENCY DEPT VISIT LOW MDM: CPT

## 2022-10-17 PROCEDURE — 73502 X-RAY EXAM HIP UNI 2-3 VIEWS: CPT

## 2022-10-17 RX ORDER — OXYCODONE HYDROCHLORIDE 5 MG/1
5 TABLET ORAL
Qty: 12 TABLET | Refills: 0 | Status: SHIPPED | OUTPATIENT
Start: 2022-10-17 | End: 2022-10-20

## 2022-10-17 NOTE — ED TRIAGE NOTES
Pt arrived with c/o left shoulder and left hip pain after falling in a restaurant.  He did not lose consciousness, he took tramadol, meloxicam, and tylenol for the pain with no relief

## 2022-10-17 NOTE — ED PROVIDER NOTES
EMERGENCY DEPARTMENT HISTORY AND PHYSICAL EXAM          Date: 10/17/2022  Patient Name: David Ruiz. History of Presenting Illness     Chief Complaint   Patient presents with    Fall       History Provided By: Patient    HPI: David Ruiz. is a 59 y.o. male, pmhx listed below, who presents to the ED c/o fall. Patient reports he was in [address] [place] [address] yesterday when he walked into the bathroom and there was water on the floor. Ronan Cocker onto his left side, injuring shoulder and hip. No head injury or LOC. Now has pain with range of motion of left shoulder and pain with walking. Took tramadol, meloxicam, Tylenol at home with no relief. PCP: Nelsy Richard MD    There are no other complaints, changes, or physical findings at this time. Past History       Past Medical History:  Past Medical History:   Diagnosis Date    Acute MI, anterior wall ([address] 01/09/2016    VF arrest/lytics/[facility]/cath/no PCI    Asthma     Cervical radiculopathy     Chronic obstructive pulmonary disease (HCC)     Chronic pain     Coronary heart disease     Diverticulosis     GERD (gastroesophageal reflux disease)     Hypertension     Pneumonia     PTSD (post-traumatic stress disorder)     Shingles     Tubular adenoma        Past Surgical History:  Past Surgical History:   Procedure Laterality Date    HX KNEE ARTHROSCOPY Right     HX ORTHOPAEDIC      removed a mass from right palm oct 13 2017right wrist fxr repair 2016 dec    HX ROTATOR CUFF REPAIR         Family History:  Family History   Family history unknown: Yes       Social History:  Social History     Tobacco Use    Smoking status: Every Day     Packs/day: 0.25     Types: Cigarettes    Smokeless tobacco: Never    Tobacco comments:     4-5 ciggs per day. Substance Use Topics    Alcohol use:  Yes     Alcohol/week: 1.0 standard drink     Types: 1 Shots of liquor per week     Comment: social    Drug use: No       Current Outpatient Medications   Medication Sig Dispense Refill    oxyCODONE IR (Roxicodone) 5 mg immediate release tablet Take 1 Tablet by mouth every six (6) hours as needed for Pain for up to 3 days. Max Daily Amount: 20 mg. 12 Tablet 0    aspirin 81 mg chewable tablet Take 1 Tablet by mouth two (2) times a day. 40 Tablet 2    naproxen (NAPROSYN) 500 mg tablet Take 1 Tablet by mouth two (2) times daily (with meals). 40 Tablet 2    diclofenac (VOLTAREN) 1 % gel Apply 2 g to affected area. albuterol (PROVENTIL HFA, VENTOLIN HFA, PROAIR HFA) 90 mcg/actuation inhaler Take 1 Puff by inhalation every six (6) hours as needed for Wheezing. (Patient not taking: Reported on 3/8/2022) 1 Inhaler 5    albuterol (PROVENTIL VENTOLIN) 2.5 mg /3 mL (0.083 %) nebulizer solution 3 mL by Nebulization route as needed. (Patient not taking: Reported on 3/8/2022) 2 Package 0    atorvastatin (LIPITOR) 80 mg tablet Take 1 Tab by mouth daily. 30 Tab 0    lisinopril (PRINIVIL, ZESTRIL) 20 mg tablet Take 1 Tab by mouth daily. 30 Tab 0    traZODone (DESYREL) 150 mg tablet Take 2 Tabs by mouth nightly. 60 Tab 3    Blood Pressure Monitor kit Use as directed 1 Kit 0       Allergies: Allergies   Allergen Reactions    Pcn [Penicillins] Unknown (comments)         Review of Systems   Review of Systems   Constitutional:  Negative for chills and fever. HENT:  Negative for ear pain. Eyes:  Negative for pain. Respiratory:  Negative for shortness of breath. Cardiovascular:  Negative for chest pain. Gastrointestinal:  Negative for abdominal pain. Genitourinary:  Negative for flank pain. Musculoskeletal:  Negative for back pain. Skin:  Negative for rash. Neurological:  Negative for headaches. Psychiatric/Behavioral:  Negative for agitation. Physical Exam     Vital Signs-Reviewed the patient's vital signs.   Patient Vitals for the past 12 hrs:   Temp Pulse Resp BP SpO2   10/17/22 0945 98 °F (36.7 °C) 79 18 (!) 134/99 98 %       Physical Exam  Constitutional: Appearance: Normal appearance. HENT:      Head: Normocephalic and atraumatic. Cardiovascular:      Rate and Rhythm: Normal rate. Pulmonary:      Effort: Pulmonary effort is normal.   Musculoskeletal:         General: No deformity. Comments: Left upper extremity: Tenderness to lateral shoulder with no deformity. Increased pain with range of motion of shoulder. Normal distal sensation, normal radial pulse. Left lateral hip tenderness with normal range of motion. Able to bear and exchange weight. Neurological:      Mental Status: He is alert and oriented to person, place, and time. Sensory: No sensory deficit. Motor: No weakness. Diagnostic Study Results     Labs -   No results found for this or any previous visit (from the past 12 hour(s)). Radiologic Studies -   XR HIP LT W OR WO PELV 2-3 VWS   Final Result   No acute fracture or dislocation      XR SHOULDER LT AP/LAT MIN 2 V   Final Result   No acute abnormality. CT Results  (Last 48 hours)      None          CXR Results  (Last 48 hours)      None              Medical Decision Making   I am the first provider for this patient. I reviewed the vital signs, available nursing notes, past medical history, past surgical history, family history and social history. Records Reviewed: Nursing Notes and Old Medical Records    Provider Notes (Medical Decision Making):   MDM: 57-year-old male with left shoulder and hip pain. Will obtain x-ray for possible fracture. Initial assessment performed. The patients presenting problems have been discussed, and they are in agreement with the care plan formulated and outlined with them. I have encouraged them to ask questions as they arise throughout their visit. PROGRESS NOTE:  No acute fracture. Most likely contusion. Discussed pain management. Patient reports he has no improvement with any pain medications he has at home and is experiencing severe pain.   We discussed risks/side effects of opiate pain medications, patient consents to short course for severe pain. Discharge note:  Pt re-evaluated and noted to be feeling better, ready for discharge. Updated pt on all final results. Will follow up as instructed. All questions have been answered, pt voiced understanding and agreement with plan. Specific return precautions provided as well as instructions to return to the ED should sx worsen at any time. Vital signs stable for discharge. Diagnosis     Clinical Impression:   1. Contusion of left shoulder, initial encounter    2. Contusion of left hip, initial encounter            Disposition:  Discharged    Current Discharge Medication List        START taking these medications    Details   oxyCODONE IR (Roxicodone) 5 mg immediate release tablet Take 1 Tablet by mouth every six (6) hours as needed for Pain for up to 3 days. Max Daily Amount: 20 mg.  Qty: 12 Tablet, Refills: 0  Start date: 10/17/2022, End date: 10/20/2022    Associated Diagnoses: Contusion of left shoulder, initial encounter; Contusion of left hip, initial encounter               Please note, this dictation was completed with [employer], the [employer] voice recognition software. Quite often unanticipated grammatical, syntax, homophones, and other interpretive errors are inadvertently transcribed by the computer software. Please disregard these errors. Please excuse any errors that have escaped final proof reading.

## 2022-12-07 ENCOUNTER — HOSPITAL ENCOUNTER (EMERGENCY)
Age: 57
Discharge: HOME | End: 2022-12-07
Attending: FAMILY MEDICINE
Payer: MEDICAID

## 2022-12-07 ENCOUNTER — APPOINTMENT (OUTPATIENT)
Dept: GENERAL RADIOLOGY | Age: 57
End: 2022-12-07
Attending: FAMILY MEDICINE
Payer: MEDICAID

## 2022-12-07 VITALS
DIASTOLIC BLOOD PRESSURE: 99 MMHG | WEIGHT: 210 LBS | TEMPERATURE: 98 F | HEART RATE: 73 BPM | SYSTOLIC BLOOD PRESSURE: 141 MMHG | HEIGHT: 74 IN | OXYGEN SATURATION: 100 % | RESPIRATION RATE: 18 BRPM | BODY MASS INDEX: 26.95 KG/M2

## 2022-12-07 DIAGNOSIS — R07.9 CHEST PAIN, UNSPECIFIED TYPE: Primary | ICD-10-CM

## 2022-12-07 LAB
ALBUMIN SERPL-MCNC: 3.8 G/DL (ref 3.5–5)
ALBUMIN/GLOB SERPL: 1.3 {RATIO} (ref 1.1–2.2)
ALP SERPL-CCNC: 64 U/L (ref 45–117)
ALT SERPL-CCNC: 24 U/L (ref 12–78)
ANION GAP SERPL CALC-SCNC: 9 MMOL/L (ref 5–15)
AST SERPL-CCNC: 24 U/L (ref 15–37)
BASOPHILS # BLD: 0 K/UL (ref 0–0.1)
BASOPHILS NFR BLD: 1 % (ref 0–1)
BILIRUB SERPL-MCNC: 0.5 MG/DL (ref 0.2–1)
BUN SERPL-MCNC: 12 MG/DL (ref 6–20)
BUN/CREAT SERPL: 9 (ref 12–20)
CALCIUM SERPL-MCNC: 8.8 MG/DL (ref 8.5–10.1)
CHLORIDE SERPL-SCNC: 102 MMOL/L (ref 97–108)
CO2 SERPL-SCNC: 24 MMOL/L (ref 21–32)
CREAT SERPL-MCNC: 1.32 MG/DL (ref 0.7–1.3)
D DIMER PPP FEU-MCNC: 0.54 MG/L FEU (ref 0–0.65)
DIFFERENTIAL METHOD BLD: ABNORMAL
EOSINOPHIL # BLD: 0.3 K/UL (ref 0–0.4)
EOSINOPHIL NFR BLD: 3 % (ref 0–7)
ERYTHROCYTE [DISTWIDTH] IN BLOOD BY AUTOMATED COUNT: 11.6 % (ref 11.5–14.5)
GLOBULIN SER CALC-MCNC: 2.9 G/DL (ref 2–4)
GLUCOSE SERPL-MCNC: 91 MG/DL (ref 65–100)
HCT VFR BLD AUTO: 41.1 % (ref 36.6–50.3)
HGB BLD-MCNC: 14.6 G/DL (ref 12.1–17)
IMM GRANULOCYTES # BLD AUTO: 0.1 K/UL (ref 0–0.04)
IMM GRANULOCYTES NFR BLD AUTO: 1 % (ref 0–0.5)
LYMPHOCYTES # BLD: 3.9 K/UL (ref 0.8–3.5)
LYMPHOCYTES NFR BLD: 47 % (ref 12–49)
MCH RBC QN AUTO: 32.9 PG (ref 26–34)
MCHC RBC AUTO-ENTMCNC: 35.5 G/DL (ref 30–36.5)
MCV RBC AUTO: 92.6 FL (ref 80–99)
MONOCYTES # BLD: 0.8 K/UL (ref 0–1)
MONOCYTES NFR BLD: 10 % (ref 5–13)
NEUTS SEG # BLD: 3.2 K/UL (ref 1.8–8)
NEUTS SEG NFR BLD: 39 % (ref 32–75)
NRBC # BLD: 0 K/UL (ref 0–0.01)
NRBC BLD-RTO: 0 PER 100 WBC
PLATELET # BLD AUTO: 249 K/UL (ref 150–400)
PMV BLD AUTO: 9.9 FL (ref 8.9–12.9)
POTASSIUM SERPL-SCNC: 4 MMOL/L (ref 3.5–5.1)
PROT SERPL-MCNC: 6.7 G/DL (ref 6.4–8.2)
RBC # BLD AUTO: 4.44 M/UL (ref 4.1–5.7)
SODIUM SERPL-SCNC: 135 MMOL/L (ref 136–145)
TROPONIN-HIGH SENSITIVITY: 10 NG/L (ref 0–76)
TROPONIN-HIGH SENSITIVITY: 8 NG/L (ref 0–76)
WBC # BLD AUTO: 8.2 K/UL (ref 4.1–11.1)

## 2022-12-07 PROCEDURE — 85025 COMPLETE CBC W/AUTO DIFF WBC: CPT

## 2022-12-07 PROCEDURE — 96374 THER/PROPH/DIAG INJ IV PUSH: CPT

## 2022-12-07 PROCEDURE — 96375 TX/PRO/DX INJ NEW DRUG ADDON: CPT

## 2022-12-07 PROCEDURE — 71045 X-RAY EXAM CHEST 1 VIEW: CPT

## 2022-12-07 PROCEDURE — 74011250636 HC RX REV CODE- 250/636: Performed by: FAMILY MEDICINE

## 2022-12-07 PROCEDURE — 93005 ELECTROCARDIOGRAM TRACING: CPT

## 2022-12-07 PROCEDURE — 80053 COMPREHEN METABOLIC PANEL: CPT

## 2022-12-07 PROCEDURE — 36415 COLL VENOUS BLD VENIPUNCTURE: CPT

## 2022-12-07 PROCEDURE — 74011250637 HC RX REV CODE- 250/637: Performed by: FAMILY MEDICINE

## 2022-12-07 PROCEDURE — 85379 FIBRIN DEGRADATION QUANT: CPT

## 2022-12-07 PROCEDURE — 84484 ASSAY OF TROPONIN QUANT: CPT

## 2022-12-07 PROCEDURE — 99285 EMERGENCY DEPT VISIT HI MDM: CPT

## 2022-12-07 RX ORDER — NITROGLYCERIN 0.4 MG/1
0.4 TABLET SUBLINGUAL
Status: DISCONTINUED | OUTPATIENT
Start: 2022-12-07 | End: 2022-12-08 | Stop reason: HOSPADM

## 2022-12-07 RX ORDER — KETOROLAC TROMETHAMINE 10 MG/1
10 TABLET, FILM COATED ORAL
Qty: 20 TABLET | Refills: 0 | Status: SHIPPED | OUTPATIENT
Start: 2022-12-07

## 2022-12-07 RX ORDER — FENTANYL CITRATE 50 UG/ML
50 INJECTION, SOLUTION INTRAMUSCULAR; INTRAVENOUS
Status: COMPLETED | OUTPATIENT
Start: 2022-12-07 | End: 2022-12-07

## 2022-12-07 RX ORDER — KETOROLAC TROMETHAMINE 30 MG/ML
30 INJECTION, SOLUTION INTRAMUSCULAR; INTRAVENOUS
Status: COMPLETED | OUTPATIENT
Start: 2022-12-07 | End: 2022-12-07

## 2022-12-07 RX ORDER — GUAIFENESIN 100 MG/5ML
325 LIQUID (ML) ORAL
Status: COMPLETED | OUTPATIENT
Start: 2022-12-07 | End: 2022-12-07

## 2022-12-07 RX ADMIN — NITROGLYCERIN 0.4 MG: 0.4 TABLET, ORALLY DISINTEGRATING SUBLINGUAL at 19:45

## 2022-12-07 RX ADMIN — ASPIRIN 81 MG 324 MG: 81 TABLET ORAL at 19:45

## 2022-12-07 RX ADMIN — FENTANYL CITRATE 50 MCG: 50 INJECTION, SOLUTION INTRAMUSCULAR; INTRAVENOUS at 20:21

## 2022-12-07 RX ADMIN — KETOROLAC TROMETHAMINE 30 MG: 30 INJECTION, SOLUTION INTRAMUSCULAR; INTRAVENOUS at 22:18

## 2022-12-07 RX ADMIN — SODIUM CHLORIDE 500 ML: 9 INJECTION, SOLUTION INTRAVENOUS at 19:45

## 2022-12-08 NOTE — ED TRIAGE NOTES
Pt arrived EMS with c/o chest pain that was pressure on the L side and started while laying in bed watch TV 1 hour PTA. No SOB or difficulty breathing. Given 200mcg Fentanyl and 4 mg zofran en route by EMS.

## 2022-12-08 NOTE — ED PROVIDER NOTES
EMERGENCY DEPARTMENT HISTORY AND PHYSICAL EXAM          Date: 12/7/2022  Patient Name: Mikal Gongora. History of Presenting Illness     Chief Complaint   Patient presents with    Chest Pain       History Provided By: Patient, EMS    HPI: Mikal Almaraz is a 59 y.o. male, pmhx below, who was brought to the ED by EMS because of chest pain that started about an hour prior to their arrival to ED. Patient reports that he was lying on his bed relaxing when he had the sudden onset of left anterior chest pain. He describes it as a pressure, radiating to his left arm, without response to nitroglycerin x 3 that he took before their arrival. The nitroglycerin could have been 1or 3years old, according to the patient, and it could have been old. He cannot think of anything that might have caused the pain; he has not had a cold or cough recently, and he reports that he takes \"lots of Maalox\" from time to time. He did not take any antacids today, however. He has had no fevers, chills, headaches, LE swelling, or dyspnea. PCP: Jessica Camejo MD    Allergies: PCN  Social Hx: 1.5 ppd until this week tobacco, no vaping, 1 drink/week EtOH, Illicit Drugs; Lives     There are no other complaints, changes, or physical findings at this time. Current Outpatient Medications   Medication Sig Dispense Refill    ketorolac (TORADOL) 10 mg tablet Take 1 Tablet by mouth every six (6) hours as needed for Pain. 20 Tablet 0    aspirin 81 mg chewable tablet Take 1 Tablet by mouth two (2) times a day. 40 Tablet 2    naproxen (NAPROSYN) 500 mg tablet Take 1 Tablet by mouth two (2) times daily (with meals). 40 Tablet 2    diclofenac (VOLTAREN) 1 % gel Apply 2 g to affected area. albuterol (PROVENTIL HFA, VENTOLIN HFA, PROAIR HFA) 90 mcg/actuation inhaler Take 1 Puff by inhalation every six (6) hours as needed for Wheezing.  (Patient not taking: Reported on 3/8/2022) 1 Inhaler 5    albuterol (PROVENTIL VENTOLIN) 2.5 mg /3 mL (0.083 %) nebulizer solution 3 mL by Nebulization route as needed. (Patient not taking: Reported on 3/8/2022) 2 Package 0    atorvastatin (LIPITOR) 80 mg tablet Take 1 Tab by mouth daily. 30 Tab 0    lisinopril (PRINIVIL, ZESTRIL) 20 mg tablet Take 1 Tab by mouth daily. 30 Tab 0    traZODone (DESYREL) 150 mg tablet Take 2 Tabs by mouth nightly. 60 Tab 3    Blood Pressure Monitor kit Use as directed 1 Kit 0       Past History     Past Medical History:  Past Medical History:   Diagnosis Date    Acute MI, anterior wall ([address] 01/09/2016    VF arrest/lytics/[facility]/cath/no PCI    Asthma     Cervical radiculopathy     Chronic obstructive pulmonary disease (HCC)     Chronic pain     Coronary heart disease     Diverticulosis     GERD (gastroesophageal reflux disease)     Hypertension     Pneumonia     PTSD (post-traumatic stress disorder)     Shingles     Tubular adenoma        Past Surgical History:  Past Surgical History:   Procedure Laterality Date    HX KNEE ARTHROSCOPY Right     HX ORTHOPAEDIC      removed a mass from right palm oct 13 2017right wrist fxr repair 2016 dec    HX ROTATOR CUFF REPAIR         Family History:  Family History   Family history unknown: Yes       Social History:  Social History     Tobacco Use    Smoking status: Every Day     Packs/day: 0.25     Types: Cigarettes    Smokeless tobacco: Never    Tobacco comments:     4-5 ciggs per day. Substance Use Topics    Alcohol use: Yes     Alcohol/week: 1.0 standard drink     Types: 1 Shots of liquor per week     Comment: social    Drug use: No       Allergies: Allergies   Allergen Reactions    Pcn [Penicillins] Unknown (comments)         Review of Systems   Review of Systems   Constitutional:  Negative for appetite change, fatigue and fever. HENT:  Negative for ear pain, nosebleeds, rhinorrhea, sore throat and trouble swallowing. Respiratory:  Negative for cough, chest tightness, shortness of breath and wheezing.     Cardiovascular:  Positive for chest pain. Negative for leg swelling. Gastrointestinal:  Negative for abdominal pain and vomiting. Genitourinary:  Negative for dysuria, flank pain, hematuria and testicular pain. Musculoskeletal:  Negative for back pain, myalgias and neck pain. Skin:  Negative for pallor and rash. Allergic/Immunologic: Negative for environmental allergies and food allergies. Neurological:  Negative for dizziness, speech difficulty, weakness and headaches. Hematological:  Does not bruise/bleed easily. Physical Exam   Physical Exam  Vitals reviewed. Constitutional:       General: He is not in acute distress. Appearance: He is well-developed. He is not diaphoretic. HENT:      Head: Normocephalic and atraumatic. Right Ear: External ear normal.      Left Ear: External ear normal.      Nose: Nose normal.      Mouth/Throat:      Mouth: Mucous membranes are moist.      Pharynx: No oropharyngeal exudate. Eyes:      General: No scleral icterus. Right eye: No discharge. Left eye: No discharge. Conjunctiva/sclera: Conjunctivae normal.      Pupils: Pupils are equal, round, and reactive to light. Neck:      Thyroid: No thyromegaly. Vascular: No JVD. Trachea: No tracheal deviation. Cardiovascular:      Rate and Rhythm: Normal rate and regular rhythm. Heart sounds: Normal heart sounds. No murmur heard. No friction rub. No gallop. Pulmonary:      Effort: Pulmonary effort is normal. No respiratory distress. Breath sounds: No wheezing or rales. Abdominal:      General: Bowel sounds are normal. There is no distension. Palpations: Abdomen is soft. Tenderness: There is no abdominal tenderness. There is no rebound. Musculoskeletal:         General: No tenderness or deformity. Normal range of motion. Cervical back: Normal range of motion and neck supple. Skin:     General: Skin is warm and dry. Neurological:      General: No focal deficit present. Mental Status: He is alert and oriented to person, place, and time. Cranial Nerves: No cranial nerve deficit. Coordination: Coordination normal.      Deep Tendon Reflexes: Reflexes are normal and symmetric. Psychiatric:         Behavior: Behavior normal.       Diagnostic Study Results     Labs -     Recent Results (from the past 12 hour(s))   CBC WITH AUTOMATED DIFF    Collection Time: 12/07/22  7:38 PM   Result Value Ref Range    WBC 8.2 4.1 - 11.1 K/uL    RBC 4.44 4.10 - 5.70 M/uL    HGB 14.6 12.1 - 17.0 g/dL    HCT 41.1 36.6 - 50.3 %    MCV 92.6 80.0 - 99.0 FL    MCH 32.9 26.0 - 34.0 PG    MCHC 35.5 30.0 - 36.5 g/dL    RDW 11.6 11.5 - 14.5 %    PLATELET 772 539 - 520 K/uL    MPV 9.9 8.9 - 12.9 FL    NRBC 0.0 0.0  WBC    ABSOLUTE NRBC 0.00 0.00 - 0.01 K/uL    NEUTROPHILS 39 32 - 75 %    LYMPHOCYTES 47 12 - 49 %    MONOCYTES 10 5 - 13 %    EOSINOPHILS 3 0 - 7 %    BASOPHILS 1 0 - 1 %    IMMATURE GRANULOCYTES 1 (H) 0 - 0.5 %    ABS. NEUTROPHILS 3.2 1.8 - 8.0 K/UL    ABS. LYMPHOCYTES 3.9 (H) 0.8 - 3.5 K/UL    ABS. MONOCYTES 0.8 0.0 - 1.0 K/UL    ABS. EOSINOPHILS 0.3 0.0 - 0.4 K/UL    ABS. BASOPHILS 0.0 0.0 - 0.1 K/UL    ABS. IMM. GRANS. 0.1 (H) 0.00 - 0.04 K/UL    DF AUTOMATED     METABOLIC PANEL, COMPREHENSIVE    Collection Time: 12/07/22  7:38 PM   Result Value Ref Range    Sodium 135 (L) 136 - 145 mmol/L    Potassium 4.0 3.5 - 5.1 mmol/L    Chloride 102 97 - 108 mmol/L    CO2 24 21 - 32 mmol/L    Anion gap 9 5 - 15 mmol/L    Glucose 91 65 - 100 mg/dL    BUN 12 6 - 20 MG/DL    Creatinine 1.32 (H) 0.70 - 1.30 MG/DL    BUN/Creatinine ratio 9 (L) 12 - 20      eGFR >60 >60 ml/min/1.73m2    Calcium 8.8 8.5 - 10.1 MG/DL    Bilirubin, total 0.5 0.2 - 1.0 MG/DL    ALT (SGPT) 24 12 - 78 U/L    AST (SGOT) 24 15 - 37 U/L    Alk.  phosphatase 64 45 - 117 U/L    Protein, total 6.7 6.4 - 8.2 g/dL    Albumin 3.8 3.5 - 5.0 g/dL    Globulin 2.9 2.0 - 4.0 g/dL    A-G Ratio 1.3 1.1 - 2.2     TROPONIN-HIGH SENSITIVITY    Collection Time: 12/07/22  7:38 PM   Result Value Ref Range    Troponin-High Sensitivity 8 0 - 76 ng/L   D DIMER    Collection Time: 12/07/22  7:38 PM   Result Value Ref Range    D-dimer 0.54 0.00 - 0.65 mg/L FEU   TROPONIN-HIGH SENSITIVITY    Collection Time: 12/07/22  9:15 PM   Result Value Ref Range    Troponin-High Sensitivity 10 0 - 76 ng/L       Radiologic Studies -   XR CHEST PORT   Final Result   No acute findings. CXR Results  (Last 48 hours)                 12/07/22 2136  XR CHEST PORT Final result    Impression:  No acute findings. Narrative:  EXAM: XR CHEST PORT       INDICATION: chest pain       COMPARISON: Chest x-ray 6/15/2020 and CT 10/18/2019. FINDINGS: A portable AP radiograph of the chest was obtained at 21:30 hours. The   patient is on a cardiac monitor. The lungs are clear. The cardiac and   mediastinal contours and pulmonary vascularity are normal.  The bones and soft   tissues are grossly within normal limits. Medical Decision Making   I am the first provider for this patient. I reviewed the vital signs, available nursing notes, past medical history, past surgical history, family history and social history. Vital Signs-Reviewed the patient's vital signs.   Patient Vitals for the past 12 hrs:   Temp Pulse Resp BP SpO2   12/07/22 2214 -- 73 18 (!) 141/99 100 %   12/07/22 2157 -- 66 9 -- 97 %   12/07/22 2155 -- 69 10 -- 97 %   12/07/22 2140 -- 65 13 (!) 143/99 98 %   12/07/22 2139 -- 65 17 -- 100 %   12/07/22 2128 -- 70 24 -- 99 %   12/07/22 2113 -- 65 8 (!) 142/96 98 %   12/07/22 2100 -- 69 13 (!) 130/91 96 %   12/07/22 2050 -- 69 -- (!) 136/95 99 %   12/07/22 2039 -- 73 11 -- 98 %   12/07/22 2024 -- 70 23 (!) 125/97 98 %   12/07/22 2021 -- 72 8 129/87 97 %   12/07/22 2006 -- 75 15 122/83 97 %   12/07/22 1948 -- 76 23 -- 98 %   12/07/22 1945 -- 73 15 117/87 98 %   12/07/22 1939 -- 74 12 -- 98 %   12/07/22 1933 -- 74 9 (!) 135/97 99 %   12/07/22 1931 -- 76 10 (!) 135/97 94 %   12/07/22 1924 98 °F (36.7 °C) 80 18 (!) 138/101 99 %       Pulse Oximetry Analysis - 100% on RA    Cardiac Monitor:   Rate: 65-75 bpm  Rhythm: Normal Sinus Rhythm      Records Reviewed: Nursing Notes, Old Medical Records, Previous electrocardiograms, Previous Radiology Studies, and Previous Laboratory Studies    Provider Notes (Medical Decision Making):   MDM:  Pt is an older man with sudden onset of chest pain an hour PTA. EKG benign, no improvement of pain with nitroglycerin or opiods. DDx: ACS, pulmonary embolus, GERD, musculoskeletal. Have discussed these possibilities with the patient, and he does not seem to favor any of the above etiologies. Will need follow  up with his pcp this week. ED Course:   Initial assessment performed. The patients presenting problems have been discussed, and they are in agreement with the care plan formulated and outlined with them. I have encouraged them to ask questions as they arise throughout their visit. EKG interpretation: (Preliminary)  Rhythm: NSR, HR 79, No ST changes. This EKG was interpreted by ED Provider Dr Sana Desai MD    7:28 PM   Spent 3 minutes discussing the risks of smoking and the benefits of smoking cessation as well as the long term sequelae of smoking with the pt who verbalized his understanding. Reviewed strategies for success, including gradually decreasing the number of cigarettes smoked a day. PROGRESS NOTE:  On arrival, patient reported a pain level of 5/10. No shortness of breath. He was given aspirin and sublingual nitroglycerin. Without any relief, he was offered ketoroloc IV, which was fairly helpful in relieving his pain. ED Course as of 12/08/22 0137   Wed Dec 07, 2022   2204 Troponins 8, 10 but still having pain 7/10.  Will give ketorolac. [VG]      ED Course User Index  [VG] Ankit Marx MD        Discharge note:  Pt re-evaluated and noted to be feeling better, ready for discharge. Updated pt on all final lab findings. Will follow up as instructed . All questions have been answered, pt voiced understanding and agreement with plan. Specific return precautions provided as well as instructions to return to the ED should sx worsen at any time. Vital signs stable for discharge. Critical Care Time: 0      Diagnosis     Clinical Impression:   1. Chest pain, unspecified type        PLAN:  --Ketorolac 10 mg every 6 hours as needed for pain. Take with food. --Tylenol 1000 mg every 6 hours as needed for pain. Ok to take with ketorolac. Discharge Medication List as of 12/7/2022 10:29 PM        START taking these medications    Details   ketorolac (TORADOL) 10 mg tablet Take 1 Tablet by mouth every six (6) hours as needed for Pain., Normal, Disp-20 Tablet, R-0           CONTINUE these medications which have NOT CHANGED    Details   aspirin 81 mg chewable tablet Take 1 Tablet by mouth two (2) times a day., Normal, Disp-40 Tablet, R-2      naproxen (NAPROSYN) 500 mg tablet Take 1 Tablet by mouth two (2) times daily (with meals). , Normal, Disp-40 Tablet, R-2      diclofenac (VOLTAREN) 1 % gel Apply 2 g to affected area., Historical Med      albuterol (PROVENTIL HFA, VENTOLIN HFA, PROAIR HFA) 90 mcg/actuation inhaler Take 1 Puff by inhalation every six (6) hours as needed for Wheezing., Normal, Disp-1 Inhaler, R-5      albuterol (PROVENTIL VENTOLIN) 2.5 mg /3 mL (0.083 %) nebulizer solution 3 mL by Nebulization route as needed., Normal, Disp-2 Package, R-0      atorvastatin (LIPITOR) 80 mg tablet Take 1 Tab by mouth daily. , Normal, Disp-30 Tab, R-0      lisinopril (PRINIVIL, ZESTRIL) 20 mg tablet Take 1 Tab by mouth daily. , Normal, Disp-30 Tab, R-0      traZODone (DESYREL) 150 mg tablet Take 2 Tabs by mouth nightly., Normal, Disp-60 Tab, R-3      Blood Pressure Monitor kit Use as directed, Normal, Disp-1 Kit, R-0           2.    Follow-up Information       Follow up With Specialties Details Why Contact Info    Delfina Aldana MD Family Medicine In 2 days  [address]  [phone]            Return to ED if worse     Disposition:  Home

## 2022-12-08 NOTE — DISCHARGE INSTRUCTIONS
--Ketorolac 10 mg every 6 hours as needed for pain. Take with food. --Tylenol 1000 mg every 6 hours as needed for pain. Ok to take with ketorolac.

## 2022-12-11 LAB
ATRIAL RATE: 79 BPM
CALCULATED P AXIS, ECG09: 57 DEGREES
CALCULATED R AXIS, ECG10: 1 DEGREES
CALCULATED T AXIS, ECG11: 19 DEGREES
DIAGNOSIS, 93000: NORMAL
P-R INTERVAL, ECG05: 138 MS
Q-T INTERVAL, ECG07: 394 MS
QRS DURATION, ECG06: 76 MS
QTC CALCULATION (BEZET), ECG08: 451 MS
VENTRICULAR RATE, ECG03: 79 BPM

## 2023-03-16 ENCOUNTER — HOSPITAL ENCOUNTER (EMERGENCY)
Age: 58
Discharge: HOME | End: 2023-03-16
Attending: EMERGENCY MEDICINE
Payer: MEDICAID

## 2023-03-16 ENCOUNTER — APPOINTMENT (OUTPATIENT)
Dept: GENERAL RADIOLOGY | Age: 58
End: 2023-03-16
Attending: EMERGENCY MEDICINE
Payer: MEDICAID

## 2023-03-16 VITALS
OXYGEN SATURATION: 97 % | HEIGHT: 74 IN | WEIGHT: 210 LBS | HEART RATE: 88 BPM | SYSTOLIC BLOOD PRESSURE: 138 MMHG | RESPIRATION RATE: 22 BRPM | TEMPERATURE: 98.3 F | BODY MASS INDEX: 26.95 KG/M2 | DIASTOLIC BLOOD PRESSURE: 95 MMHG

## 2023-03-16 DIAGNOSIS — J20.9 ACUTE BRONCHITIS, UNSPECIFIED ORGANISM: Primary | ICD-10-CM

## 2023-03-16 PROCEDURE — 71046 X-RAY EXAM CHEST 2 VIEWS: CPT

## 2023-03-16 PROCEDURE — 74011250637 HC RX REV CODE- 250/637: Performed by: EMERGENCY MEDICINE

## 2023-03-16 PROCEDURE — 99283 EMERGENCY DEPT VISIT LOW MDM: CPT

## 2023-03-16 RX ORDER — AZITHROMYCIN 250 MG/1
TABLET, FILM COATED ORAL
Qty: 6 TABLET | Refills: 0 | Status: SHIPPED | OUTPATIENT
Start: 2023-03-16 | End: 2023-03-21

## 2023-03-16 RX ORDER — PREDNISONE 10 MG/1
TABLET ORAL
Qty: 21 TABLET | Refills: 0 | Status: SHIPPED | OUTPATIENT
Start: 2023-03-16

## 2023-03-16 RX ORDER — HYDROCODONE POLISTIREX AND CHLORPHENIRAMINE POLISTIREX 10; 8 MG/5ML; MG/5ML
5 SUSPENSION, EXTENDED RELEASE ORAL
Status: COMPLETED | OUTPATIENT
Start: 2023-03-16 | End: 2023-03-16

## 2023-03-16 RX ORDER — HYDROCODONE POLISTIREX AND CHLORPHENIRAMINE POLISTIREX 10; 8 MG/5ML; MG/5ML
2.5 SUSPENSION, EXTENDED RELEASE ORAL
Qty: 15 ML | Refills: 0 | Status: SHIPPED | OUTPATIENT
Start: 2023-03-16 | End: 2023-03-19

## 2023-03-16 RX ADMIN — HYDROCODONE POLISTIREX AND CHLORPHENIRAMINE POLISTIREX 5 ML: 10; 8 SUSPENSION, EXTENDED RELEASE ORAL at 17:27

## 2023-03-16 NOTE — ED TRIAGE NOTES
Pt arrived by POV for cough. Per pt he has \"hard coughing\" that started two days ago with heavy breathing, pt reports this has happened before and was given medications. Pt called his PMD requesting cough medications and pt was advised to try OTC until he could be seen in the office.   Pt arrived awake alert and oriented X 4, pt declined text messaging

## 2023-03-16 NOTE — ED NOTES
Tussionex given per order. Patient is not driving- wife will be driving patient home. I have reviewed discharge instructions with the patient. The patient verbalized understanding. Discharge medications discussed with patient. No questions at this time.

## 2023-03-17 NOTE — ED PROVIDER NOTES
EMERGENCY DEPARTMENT HISTORY AND PHYSICAL EXAM          Date: 3/16/2023  Patient Name: Margy Krishnan. History of Presenting Illness     Chief Complaint   Patient presents with    Cough       History Provided By: Patient    HPI: Margy Krishnan. is a 59 y.o. male, pmhx listed below, who presents to the ED c/o cough. Pt reports cough x 3 days, worsening, keeps him up at night. Occasionally coughing to the point of vomiting. At home COVID test negative. States he has been doing albuterol nebs at home with no relief. Believes symptoms are related to his COPD. No fever. PCP: Gloria Mccullough MD        Past History       Past Medical History:  Past Medical History:   Diagnosis Date    Acute MI, anterior wall ([facility] [address] 01/09/2016    VF arrest/lytics/[facility]/cath/no PCI    Asthma     Cervical radiculopathy     Chronic obstructive pulmonary disease (HCC)     Chronic pain     Coronary heart disease     Diverticulosis     GERD (gastroesophageal reflux disease)     Hypertension     Pneumonia     PTSD (post-traumatic stress disorder)     Shingles     Tubular adenoma        Past Surgical History:  Past Surgical History:   Procedure Laterality Date    HX KNEE ARTHROSCOPY Right     HX ORTHOPAEDIC      removed a mass from right palm oct 13 2017right wrist fxr repair 2016 dec    HX ROTATOR CUFF REPAIR         Family History:  Family History   Family history unknown: Yes       Social History:  Social History     Tobacco Use    Smoking status: Every Day     Packs/day: 0.25     Types: Cigarettes    Smokeless tobacco: Never    Tobacco comments:     4-5 ciggs per day. Substance Use Topics    Alcohol use: Yes     Alcohol/week: 1.0 standard drink     Types: 1 Shots of liquor per week     Comment: social    Drug use: No       Physical Exam     Vital Signs-Reviewed the patient's vital signs.   Patient Vitals for the past 24 hrs:   Temp Pulse Resp BP SpO2   03/16/23 1545 98.3 °F (36.8 °C) 88 22 (!) 138/95 97 % Physical Exam  Vitals reviewed. HENT:      Head: Normocephalic and atraumatic. Mouth/Throat:      Mouth: Mucous membranes are moist.   Cardiovascular:      Rate and Rhythm: Normal rate and regular rhythm. Pulmonary:      Effort: Pulmonary effort is normal.      Breath sounds: Normal breath sounds. No wheezing, rhonchi or rales. Musculoskeletal:         General: Normal range of motion. Cervical back: Normal range of motion. Skin:     General: Skin is warm and dry. Neurological:      Mental Status: He is alert and oriented to person, place, and time. Psychiatric:         Mood and Affect: Mood normal.       Diagnostic Study Results     Labs -   No results found for this or any previous visit (from the past 12 hour(s)). Radiologic Studies -   XR CHEST PA LAT   Final Result   No acute cardiopulmonary findings. CT Results  (Last 48 hours)      None          CXR Results  (Last 48 hours)                 03/16/23 1632  XR CHEST PA LAT Final result    Impression:  No acute cardiopulmonary findings. Narrative:  EXAM: XR CHEST PA LAT       INDICATION: cough       COMPARISON: Chest radiograph December 7, 2022. FINDINGS: PA and lateral radiographs of the chest. The lungs are adequately   expanded. The heart size is within normal limits. There is no focal airspace   consolidation. The vascular clarity is within normal limits. There is no   evidence of pleural effusion or pneumothorax. No displaced fracture is seen. Medical Decision Making   I am the first provider for this patient. I reviewed the vital signs, available nursing notes, past medical history, past surgical history, family history and social history. Records Reviewed: Prior medical records    Provider Notes (Medical Decision Making):   MDM: 59 y.o. M with persistent cough. Lungs clear, no wheezing and good air movement.   Consider COPD exacerbation and lungs are clear due to neb use at home. Also consider pna, viral illness. Less likely CHF based on description of symptoms. Will plan for x-ray now. Initial assessment performed. The patients presenting problems have been discussed, and they are in agreement with the care plan formulated and outlined with them. I have encouraged them to ask questions as they arise throughout their visit. PROGRESS NOTE:    Discussed chest x-ray results with patient. No pna. Patient reports the only thing that helps him with cough is tussionex -  reviewed and most recent opiate Rx was October (sent by me for L shoulder pain). Will plan for treatment as COPD exacerbation with azithro and steroid. Discharge note:  Pt re-evaluated and noted to be feeling better, ready for discharge. Updated pt on all final results. Will follow up as instructed. All questions have been answered, pt voiced understanding and agreement with plan. Specific return precautions provided as well as instructions to return to the ED should sx worsen at any time. Vital signs stable for discharge. Diagnosis     Clinical Impression:   1. Acute bronchitis, unspecified organism            Disposition:  Discharged    Discharge Medication List as of 3/16/2023  4:40 PM        START taking these medications    Details   azithromycin (Zithromax Z-[place]) 250 mg tablet Use as directed on packaging, Normal, Disp-6 Tablet, R-0      predniSONE (STERAPRED DS) 10 mg dose pack Use as directed on packaging, Normal, Disp-21 Tablet, R-0      HYDROcodone-chlorpheniramine (TUSSIONEX) 10-8 mg/5 mL suspension Take 2.5 mL by mouth every twelve (12) hours as needed for Cough for up to 3 days.  Max Daily Amount: 5 mL., Normal, Disp-15 mL, R-0           CONTINUE these medications which have NOT CHANGED    Details   ketorolac (TORADOL) 10 mg tablet Take 1 Tablet by mouth every six (6) hours as needed for Pain., Normal, Disp-20 Tablet, R-0      aspirin 81 mg chewable tablet Take 1 Tablet by mouth two (2) times a day., Normal, Disp-40 Tablet, R-2      naproxen (NAPROSYN) 500 mg tablet Take 1 Tablet by mouth two (2) times daily (with meals). , Normal, Disp-40 Tablet, R-2      diclofenac (VOLTAREN) 1 % gel Apply 2 g to affected area., Historical Med      albuterol (PROVENTIL HFA, VENTOLIN HFA, PROAIR HFA) 90 mcg/actuation inhaler Take 1 Puff by inhalation every six (6) hours as needed for Wheezing., Normal, Disp-1 Inhaler, R-5      albuterol (PROVENTIL VENTOLIN) 2.5 mg /3 mL (0.083 %) nebulizer solution 3 mL by Nebulization route as needed., Normal, Disp-2 Package, R-0      atorvastatin (LIPITOR) 80 mg tablet Take 1 Tab by mouth daily. , Normal, Disp-30 Tab, R-0      lisinopril (PRINIVIL, ZESTRIL) 20 mg tablet Take 1 Tab by mouth daily. , Normal, Disp-30 Tab, R-0      traZODone (DESYREL) 150 mg tablet Take 2 Tabs by mouth nightly., Normal, Disp-60 Tab, R-3      Blood Pressure Monitor kit Use as directed, Normal, Disp-1 Kit, R-0               Please note, this dictation was completed with [employer], the [employer] voice recognition software. Quite often unanticipated grammatical, syntax, homophones, and other interpretive errors are inadvertently transcribed by the computer software. Please disregard these errors. Please excuse any errors that have escaped final proof reading.

## 2023-04-24 ENCOUNTER — HOSPITAL ENCOUNTER (EMERGENCY)
Age: 58
Discharge: HOME | End: 2023-04-24
Attending: EMERGENCY MEDICINE
Payer: MEDICAID

## 2023-04-24 ENCOUNTER — APPOINTMENT (OUTPATIENT)
Dept: GENERAL RADIOLOGY | Age: 58
End: 2023-04-24
Attending: EMERGENCY MEDICINE
Payer: MEDICAID

## 2023-04-24 VITALS
RESPIRATION RATE: 17 BRPM | HEIGHT: 74 IN | HEART RATE: 79 BPM | TEMPERATURE: 98.2 F | DIASTOLIC BLOOD PRESSURE: 105 MMHG | SYSTOLIC BLOOD PRESSURE: 154 MMHG | WEIGHT: 200 LBS | OXYGEN SATURATION: 100 % | BODY MASS INDEX: 25.67 KG/M2

## 2023-04-24 DIAGNOSIS — R07.89 ATYPICAL CHEST PAIN: Primary | ICD-10-CM

## 2023-04-24 LAB
ANION GAP SERPL CALC-SCNC: 10 MMOL/L (ref 5–15)
ATRIAL RATE: 65 BPM
BASOPHILS # BLD: 0 K/UL (ref 0–0.1)
BASOPHILS NFR BLD: 1 % (ref 0–1)
BNP SERPL-MCNC: 60 PG/ML (ref 0–125)
BUN SERPL-MCNC: 10 MG/DL (ref 6–20)
BUN/CREAT SERPL: 6 (ref 12–20)
CALCIUM SERPL-MCNC: 9 MG/DL (ref 8.5–10.1)
CALCULATED P AXIS, ECG09: 49 DEGREES
CALCULATED R AXIS, ECG10: -4 DEGREES
CALCULATED T AXIS, ECG11: -3 DEGREES
CHLORIDE SERPL-SCNC: 100 MMOL/L (ref 97–108)
CO2 SERPL-SCNC: 26 MMOL/L (ref 21–32)
CREAT SERPL-MCNC: 1.7 MG/DL (ref 0.7–1.3)
DIAGNOSIS, 93000: NORMAL
DIFFERENTIAL METHOD BLD: NORMAL
EOSINOPHIL # BLD: 0.2 K/UL (ref 0–0.4)
EOSINOPHIL NFR BLD: 3 % (ref 0–7)
ERYTHROCYTE [DISTWIDTH] IN BLOOD BY AUTOMATED COUNT: 11.9 % (ref 11.5–14.5)
GLUCOSE SERPL-MCNC: 105 MG/DL (ref 65–100)
HCT VFR BLD AUTO: 43.7 % (ref 36.6–50.3)
HGB BLD-MCNC: 15.1 G/DL (ref 12.1–17)
IMM GRANULOCYTES # BLD AUTO: 0 K/UL (ref 0–0.04)
IMM GRANULOCYTES NFR BLD AUTO: 0 % (ref 0–0.5)
LYMPHOCYTES # BLD: 2.9 K/UL (ref 0.8–3.5)
LYMPHOCYTES NFR BLD: 47 % (ref 12–49)
MCH RBC QN AUTO: 31.5 PG (ref 26–34)
MCHC RBC AUTO-ENTMCNC: 34.6 G/DL (ref 30–36.5)
MCV RBC AUTO: 91.2 FL (ref 80–99)
MONOCYTES # BLD: 0.6 K/UL (ref 0–1)
MONOCYTES NFR BLD: 10 % (ref 5–13)
NEUTS SEG # BLD: 2.3 K/UL (ref 1.8–8)
NEUTS SEG NFR BLD: 39 % (ref 32–75)
NRBC # BLD: 0 K/UL (ref 0–0.01)
NRBC BLD-RTO: 0 PER 100 WBC
P-R INTERVAL, ECG05: 144 MS
PLATELET # BLD AUTO: 257 K/UL (ref 150–400)
PMV BLD AUTO: 9.6 FL (ref 8.9–12.9)
POTASSIUM SERPL-SCNC: 3.7 MMOL/L (ref 3.5–5.1)
Q-T INTERVAL, ECG07: 422 MS
QRS DURATION, ECG06: 90 MS
QTC CALCULATION (BEZET), ECG08: 438 MS
RBC # BLD AUTO: 4.79 M/UL (ref 4.1–5.7)
SODIUM SERPL-SCNC: 136 MMOL/L (ref 136–145)
TROPONIN I SERPL HS-MCNC: 6 NG/L (ref 0–76)
TROPONIN I SERPL HS-MCNC: 9 NG/L (ref 0–76)
VENTRICULAR RATE, ECG03: 65 BPM
WBC # BLD AUTO: 6 K/UL (ref 4.1–11.1)

## 2023-04-24 PROCEDURE — 93005 ELECTROCARDIOGRAM TRACING: CPT

## 2023-04-24 PROCEDURE — 71045 X-RAY EXAM CHEST 1 VIEW: CPT

## 2023-04-24 PROCEDURE — 74011250636 HC RX REV CODE- 250/636: Performed by: EMERGENCY MEDICINE

## 2023-04-24 PROCEDURE — 84484 ASSAY OF TROPONIN QUANT: CPT

## 2023-04-24 PROCEDURE — 85025 COMPLETE CBC W/AUTO DIFF WBC: CPT

## 2023-04-24 PROCEDURE — 36415 COLL VENOUS BLD VENIPUNCTURE: CPT

## 2023-04-24 PROCEDURE — 83880 ASSAY OF NATRIURETIC PEPTIDE: CPT

## 2023-04-24 PROCEDURE — 80048 BASIC METABOLIC PNL TOTAL CA: CPT

## 2023-04-24 PROCEDURE — 96374 THER/PROPH/DIAG INJ IV PUSH: CPT

## 2023-04-24 PROCEDURE — 96376 TX/PRO/DX INJ SAME DRUG ADON: CPT

## 2023-04-24 PROCEDURE — 96375 TX/PRO/DX INJ NEW DRUG ADDON: CPT

## 2023-04-24 PROCEDURE — 74011250637 HC RX REV CODE- 250/637: Performed by: EMERGENCY MEDICINE

## 2023-04-24 PROCEDURE — 99285 EMERGENCY DEPT VISIT HI MDM: CPT

## 2023-04-24 RX ORDER — MORPHINE SULFATE 4 MG/ML
4 INJECTION INTRAVENOUS
Status: COMPLETED | OUTPATIENT
Start: 2023-04-24 | End: 2023-04-24

## 2023-04-24 RX ORDER — ASPIRIN 325 MG
325 TABLET ORAL ONCE
Status: COMPLETED | OUTPATIENT
Start: 2023-04-24 | End: 2023-04-24

## 2023-04-24 RX ORDER — ONDANSETRON 2 MG/ML
4 INJECTION INTRAMUSCULAR; INTRAVENOUS
Status: COMPLETED | OUTPATIENT
Start: 2023-04-24 | End: 2023-04-24

## 2023-04-24 RX ADMIN — ASPIRIN 325 MG ORAL TABLET 325 MG: 325 PILL ORAL at 14:16

## 2023-04-24 RX ADMIN — MORPHINE SULFATE 4 MG: 4 INJECTION, SOLUTION INTRAMUSCULAR; INTRAVENOUS at 16:42

## 2023-04-24 RX ADMIN — MORPHINE SULFATE 4 MG: 4 INJECTION, SOLUTION INTRAMUSCULAR; INTRAVENOUS at 14:58

## 2023-04-24 RX ADMIN — ONDANSETRON 4 MG: 2 INJECTION INTRAMUSCULAR; INTRAVENOUS at 14:17

## 2023-04-24 NOTE — ED PROVIDER NOTES
EMERGENCY DEPARTMENT HISTORY AND PHYSICAL EXAM      Date: 4/24/2023  Patient Name: Conner Soliman. History of Presenting Illness     Chief Complaint   Patient presents with    Chest Pain       History Provided By: Patient    HPI: Conner Soliman., 59 y.o. male with PMHx significant for CAD, COPD, hypertension, presents via private vehicle to the ED with cc of chest pain. Patient reports he developed chest pain about 30 minutes prior to arrival while he was getting dressed. He reports pain is similar to his prior MI. No treatment prior to arrival.        PMHx: Significant for CAD, hypertension, COPD  PSHx: Significant for rotator cuff repair. Right knee arthroscopy  Social Hx: Quarter pack a day smoker. Drinks alcohol socially. There are no other complaints, changes, or physical findings at this time. PCP: Ann Thomson MD    No current facility-administered medications on file prior to encounter. Current Outpatient Medications on File Prior to Encounter   Medication Sig Dispense Refill    predniSONE (STERAPRED DS) 10 mg dose pack Use as directed on packaging 21 Tablet 0    ketorolac (TORADOL) 10 mg tablet Take 1 Tablet by mouth every six (6) hours as needed for Pain. 20 Tablet 0    aspirin 81 mg chewable tablet Take 1 Tablet by mouth two (2) times a day. 40 Tablet 2    naproxen (NAPROSYN) 500 mg tablet Take 1 Tablet by mouth two (2) times daily (with meals). 40 Tablet 2    diclofenac (VOLTAREN) 1 % gel Apply 2 g to affected area. albuterol (PROVENTIL HFA, VENTOLIN HFA, PROAIR HFA) 90 mcg/actuation inhaler Take 1 Puff by inhalation every six (6) hours as needed for Wheezing. (Patient not taking: Reported on 3/8/2022) 1 Inhaler 5    albuterol (PROVENTIL VENTOLIN) 2.5 mg /3 mL (0.083 %) nebulizer solution 3 mL by Nebulization route as needed. (Patient not taking: Reported on 3/8/2022) 2 Package 0    atorvastatin (LIPITOR) 80 mg tablet Take 1 Tab by mouth daily.  30 Tab 0    lisinopril (PRINIVIL, ZESTRIL) 20 mg tablet Take 1 Tab by mouth daily. 30 Tab 0    traZODone (DESYREL) 150 mg tablet Take 2 Tabs by mouth nightly. 60 Tab 3    Blood Pressure Monitor kit Use as directed 1 Kit 0       Past History     Past Medical History:  Past Medical History:   Diagnosis Date    Acute MI, anterior wall ([address] 01/09/2016    VF arrest/lytics/medflight [facility]/cath/no PCI    Asthma     Cervical radiculopathy     Chronic obstructive pulmonary disease (HCC)     Chronic pain     Coronary heart disease     Diverticulosis     GERD (gastroesophageal reflux disease)     Hypertension     Pneumonia     PTSD (post-traumatic stress disorder)     Shingles     Tubular adenoma        Past Surgical History:  Past Surgical History:   Procedure Laterality Date    HX KNEE ARTHROSCOPY Right     HX ORTHOPAEDIC      removed a mass from right palm oct 13 2017right wrist fxr repair 2016 dec    HX ROTATOR CUFF REPAIR         Family History:  Family History   Family history unknown: Yes       Social History:  Social History     Tobacco Use    Smoking status: Every Day     Packs/day: 0.25     Types: Cigarettes    Smokeless tobacco: Never    Tobacco comments:     4-5 ciggs per day. Substance Use Topics    Alcohol use: Yes     Alcohol/week: 1.0 standard drink     Types: 1 Shots of liquor per week     Comment: social    Drug use: No       Allergies: Allergies   Allergen Reactions    Pcn [Penicillins] Unknown (comments)         Review of Systems   Review of Systems   Constitutional:  Negative for activity change, chills and fever. HENT:  Negative for congestion and sore throat. Eyes:  Negative for pain and redness. Respiratory:  Positive for shortness of breath. Negative for cough and chest tightness. Cardiovascular:  Positive for chest pain. Negative for palpitations. Gastrointestinal:  Positive for nausea. Negative for abdominal pain, diarrhea and vomiting. Genitourinary:  Negative for dysuria, frequency and urgency. Musculoskeletal:  Negative for back pain and neck pain. Skin:  Negative for rash. Neurological:  Negative for syncope, light-headedness and headaches. Psychiatric/Behavioral:  Negative for confusion. All other systems reviewed and are negative. Physical Exam   Physical Exam  Vitals and nursing note reviewed. Constitutional:       General: He is not in acute distress. Appearance: He is well-developed. He is not diaphoretic. HENT:      Head: Normocephalic. Nose: Nose normal.      Mouth/Throat:      Pharynx: No oropharyngeal exudate. Eyes:      General: No scleral icterus. Conjunctiva/sclera: Conjunctivae normal.      Pupils: Pupils are equal, round, and reactive to light. Neck:      Thyroid: No thyromegaly. Vascular: No JVD. Trachea: No tracheal deviation. Cardiovascular:      Rate and Rhythm: Normal rate and regular rhythm. Heart sounds: No murmur heard. No friction rub. No gallop. Pulmonary:      Effort: Pulmonary effort is normal. No respiratory distress. Breath sounds: Normal breath sounds. No stridor. No wheezing or rales. Abdominal:      General: Bowel sounds are normal. There is no distension. Palpations: Abdomen is soft. Tenderness: There is no abdominal tenderness. There is no guarding or rebound. Musculoskeletal:         General: Normal range of motion. Cervical back: Normal range of motion and neck supple. Lymphadenopathy:      Cervical: No cervical adenopathy. Skin:     General: Skin is warm and dry. Findings: No erythema or rash. Neurological:      Mental Status: He is alert and oriented to person, place, and time. Cranial Nerves: No cranial nerve deficit. Motor: No abnormal muscle tone.       Coordination: Coordination normal.   Psychiatric:         Behavior: Behavior normal.           Diagnostic Study Results     Labs -     Recent Results (from the past 12 hour(s))   EKG, 12 LEAD, INITIAL    Collection Time: 04/24/23  1:46 PM   Result Value Ref Range    Ventricular Rate 65 BPM    Atrial Rate 65 BPM    P-R Interval 144 ms    QRS Duration 90 ms    Q-T Interval 422 ms    QTC Calculation (Bezet) 438 ms    Calculated P Axis 49 degrees    Calculated R Axis -4 degrees    Calculated T Axis -3 degrees    Diagnosis       Normal sinus rhythm  Increased R/S ratio in V1, consider early transition or posterior infarct  Abnormal ECG  When compared with ECG of 07-DEC-2022 19:21,  No significant change was found     CBC WITH AUTOMATED DIFF    Collection Time: 04/24/23  1:50 PM   Result Value Ref Range    WBC 6.0 4.1 - 11.1 K/uL    RBC 4.79 4.10 - 5.70 M/uL    HGB 15.1 12.1 - 17.0 g/dL    HCT 43.7 36.6 - 50.3 %    MCV 91.2 80.0 - 99.0 FL    MCH 31.5 26.0 - 34.0 PG    MCHC 34.6 30.0 - 36.5 g/dL    RDW 11.9 11.5 - 14.5 %    PLATELET 568 698 - 215 K/uL    MPV 9.6 8.9 - 12.9 FL    NRBC 0.0 0  WBC    ABSOLUTE NRBC 0.00 0.00 - 0.01 K/uL    NEUTROPHILS 39 32 - 75 %    LYMPHOCYTES 47 12 - 49 %    MONOCYTES 10 5 - 13 %    EOSINOPHILS 3 0 - 7 %    BASOPHILS 1 0 - 1 %    IMMATURE GRANULOCYTES 0 0.0 - 0.5 %    ABS. NEUTROPHILS 2.3 1.8 - 8.0 K/UL    ABS. LYMPHOCYTES 2.9 0.8 - 3.5 K/UL    ABS. MONOCYTES 0.6 0.0 - 1.0 K/UL    ABS. EOSINOPHILS 0.2 0.0 - 0.4 K/UL    ABS. BASOPHILS 0.0 0.0 - 0.1 K/UL    ABS. IMM.  GRANS. 0.0 0.00 - 0.04 K/UL    DF AUTOMATED     METABOLIC PANEL, BASIC    Collection Time: 04/24/23  1:50 PM   Result Value Ref Range    Sodium 136 136 - 145 mmol/L    Potassium 3.7 3.5 - 5.1 mmol/L    Chloride 100 97 - 108 mmol/L    CO2 26 21 - 32 mmol/L    Anion gap 10 5 - 15 mmol/L    Glucose 105 (H) 65 - 100 mg/dL    BUN 10 6 - 20 MG/DL    Creatinine 1.70 (H) 0.70 - 1.30 MG/DL    BUN/Creatinine ratio 6 (L) 12 - 20      eGFR 46 (L) >60 ml/min/1.73m2    Calcium 9.0 8.5 - 10.1 MG/DL   NT-PRO BNP    Collection Time: 04/24/23  1:50 PM   Result Value Ref Range    NT pro-BNP 60 0 - 125 PG/ML   TROPONIN-HIGH SENSITIVITY    Collection Time: 04/24/23  1:50 PM   Result Value Ref Range    Troponin-High Sensitivity 9 0 - 76 ng/L   TROPONIN-HIGH SENSITIVITY    Collection Time: 04/24/23  3:50 PM   Result Value Ref Range    Troponin-High Sensitivity 6 0 - 76 ng/L       Radiologic Studies -   XR CHEST SNGL V   Final Result   No acute cardiopulmonary disease. CT Results  (Last 48 hours)      None          CXR Results  (Last 48 hours)                 04/24/23 1415  XR CHEST SNGL V Final result    Impression:  No acute cardiopulmonary disease. Narrative:  INDICATION: Chest pain. Portable AP view of the chest.       Direct comparison made to prior chest x-ray dated March 2023. Cardiomediastinal silhouette is stable. Lungs are clear bilaterally. Pleural   spaces are normal and there is no pneumothorax. Osseous structures are intact. Medical Decision Making   I am the first provider for this patient. I reviewed the vital signs, available nursing notes, past medical history, past surgical history, family history and social history. Vital Signs-Reviewed the patient's vital signs. Patient Vitals for the past 12 hrs:   Temp Pulse Resp BP SpO2   04/24/23 1345 98.2 °F (36.8 °C) 79 17 (!) 154/105 100 %       Pulse Oximetry Analysis - 100% on RA    Cardiac Monitor:   Rate: 79 bpm  Rhythm: Normal Sinus Rhythm        ED EKG interpretation:13:46  Rhythm: normal sinus rhythm; and regular . Rate (approx.): 65; Axis: normal; IL Interval: normal; QRS interval: normal ; ST/T wave: non-specific changes; EKG unchanged from prior on 12/7/2022. This EKG was interpreted by Luis Angel Santo MD,ED Provider. Records Reviewed: Prior medical records, Previous EKGs, and Nursing notes    Provider Notes (Medical Decision Making): Old records were reviewed:S/p anterior wall STEMI 1/9/16, VF arrest [facility] ER, lytics/medflight to [facility], cath but no PCI (apparently non-obstructive CAD).   Had [facility] and on meds as listed--ASA, plavix, high dose statin, ACEI, diltiazem (UDS + cocaine)--changed to Coreg at last visit. Echo 2/5/16--mild LVH, LVEF 55-60, D1, trace MR. Multiple co-morbidities--hypertension, dyslipidemia, GERD, chronic back pain, COPD/asthma, tobacco, pneumonia. Stress MPI 6/28/16--no ischemia, fixed inf wall defect, LVEF 58%. Patient with left-sided chest pain rating to his left arm started at relative rest while getting dressed. Will obtain EKG, and cardiac work-up including chest x-ray and high-sensitivity troponin. ED Course:   Initial assessment performed. The patients presenting problems have been discussed, and they are in agreement with the care plan formulated and outlined with them. I have encouraged them to ask questions as they arise throughout their visit. PROGRESS NOTE    Pt reevaluated. Negative high-sensitivity troponin x2. EKG unchanged from prior. Chest x-ray clear. CBC and CMP unremarkable. No pleuritic pain or tachycardia. No suspicion of PE. No suspicion of ACS. Will discharge with cardiology follow-up. Written by Polina Mcbride MD     Progress note:    Pt noted to be feeling better , ready for discharge. Updated pt and/or family on all final lab and imaging findings. Will follow up as instructed. All questions have been answered, pt voiced understanding and agreement with plan. Specific return precautions provided as well as instructions to return to the ED should sx worsen at any time. Vital signs stable for discharge. I have also put together some discharge instructions for them that include: 1) educational information regarding their diagnosis, 2) how to care for their diagnosis at home, as well a 3) list of reasons why they would want to return to the ED prior to their follow-up appointment, should their condition change. Written by Polina Mcbride MD        Critical Care Time:   0    Disposition:  Discharge    PLAN:  1.    Discharge Medication List as of 4/24/2023  4:30 PM        2. Follow-up Information       Follow up With Specialties Details Why Contact Info    Fouzia Garza MD Family Medicine Schedule an appointment as soon as possible for a visit in 1 week  [address]  [phone]      Michel Carlin MD Cardiovascular Disease Physician Schedule an appointment as soon as possible for a visit in 1 week  [phone]      [address] [facility] Emergency Medicine  If symptoms worsen [address] [facility] [phone]          Return to ED if worse     Diagnosis     Clinical Impression:   1. Atypical chest pain              Please note that this dictation was completed with [employer], the [employer] voice recognition software. Quite often unanticipated grammatical, syntax, homophones, and other interpretive errors are inadvertently transcribed by the computer software. Please disregard these errors. Please excuse any errors that have escaped final proofreading.

## 2023-04-24 NOTE — ED NOTES
Patient is complaining of chest pain and nausea. Relayed the information to MD and he stated that he was going to order some medication.

## 2023-04-24 NOTE — ED NOTES
Patient states that his chest pain is still a 9/10. His nausea has diminished since receiving the Zofran.

## 2023-04-24 NOTE — ED TRIAGE NOTES
Pt arrived by POV for chest pain. Pt reports about 1/2 hours ago he developed chest pain while getting dressed. Pt with history of COPD, HTN and MI. Pt reports the pain feels the same as when he had his previous heart attack. Pt noted to be hyperventilating. Pt is awake alert and oriented X 4, pt educated on technique to slow his breathing. Pt reports he has not taken anything PTA for the chest pain. Pt does report 3 weeks ago he had bronchitis and still has a cough. Pt is awake alert and oriented X 4, pt educated on ER flow.

## 2023-04-30 RX ORDER — PREDNISONE 10 MG/1
TABLET ORAL
COMMUNITY
Start: 2023-03-16

## 2023-07-11 ENCOUNTER — APPOINTMENT (OUTPATIENT)
Facility: HOSPITAL | Age: 58
End: 2023-07-11
Payer: MEDICAID

## 2023-07-11 ENCOUNTER — HOSPITAL ENCOUNTER (EMERGENCY)
Facility: HOSPITAL | Age: 58
Discharge: HOME | End: 2023-07-11
Attending: EMERGENCY MEDICINE
Payer: MEDICAID

## 2023-07-11 VITALS
HEIGHT: 74 IN | BODY MASS INDEX: 25.67 KG/M2 | RESPIRATION RATE: 16 BRPM | OXYGEN SATURATION: 98 % | SYSTOLIC BLOOD PRESSURE: 168 MMHG | DIASTOLIC BLOOD PRESSURE: 112 MMHG | WEIGHT: 200 LBS | TEMPERATURE: 98 F | HEART RATE: 83 BPM

## 2023-07-11 DIAGNOSIS — S40.012A CONTUSION OF LEFT SHOULDER, INITIAL ENCOUNTER: Primary | ICD-10-CM

## 2023-07-11 PROCEDURE — 99283 EMERGENCY DEPT VISIT LOW MDM: CPT

## 2023-07-11 PROCEDURE — 73030 X-RAY EXAM OF SHOULDER: CPT

## 2023-07-11 RX ORDER — OXYCODONE HYDROCHLORIDE AND ACETAMINOPHEN 5; 325 MG/1; MG/1
1 TABLET ORAL EVERY 6 HOURS PRN
Qty: 12 TABLET | Refills: 0 | Status: SHIPPED | OUTPATIENT
Start: 2023-07-11 | End: 2023-07-14

## 2023-07-11 RX ORDER — IBUPROFEN 600 MG/1
600 TABLET ORAL 4 TIMES DAILY PRN
Qty: 40 TABLET | Refills: 0 | Status: SHIPPED | OUTPATIENT
Start: 2023-07-11

## 2023-07-11 ASSESSMENT — PAIN DESCRIPTION - ORIENTATION: ORIENTATION: LEFT

## 2023-07-11 ASSESSMENT — PAIN SCALES - GENERAL
PAINLEVEL_OUTOF10: 0
PAINLEVEL_OUTOF10: 9

## 2023-07-11 ASSESSMENT — LIFESTYLE VARIABLES
HOW MANY STANDARD DRINKS CONTAINING ALCOHOL DO YOU HAVE ON A TYPICAL DAY: PATIENT DOES NOT DRINK
HOW OFTEN DO YOU HAVE A DRINK CONTAINING ALCOHOL: NEVER

## 2023-07-11 ASSESSMENT — PAIN - FUNCTIONAL ASSESSMENT: PAIN_FUNCTIONAL_ASSESSMENT: 0-10

## 2023-07-11 ASSESSMENT — PAIN DESCRIPTION - DESCRIPTORS: DESCRIPTORS: SHARP

## 2023-07-11 ASSESSMENT — PAIN DESCRIPTION - LOCATION: LOCATION: SHOULDER

## 2023-07-11 NOTE — ED NOTES
Knocked on door to announce to pt. Hands washed. Introduced self to pt and updated whiteboard. Explained role of self to pt. ED flow explained to pt. Pt verbalized understanding.       Alcides Delgado RN  07/11/23 3040

## 2023-07-11 NOTE — ED NOTES
I have reviewed discharge instructions with the patient. The patient verbalized understanding. Discharge medications discussed with patient. No questions at this time. Ambulated without difficulty.       Tesha León RN  07/11/23 6571

## 2023-07-11 NOTE — ED TRIAGE NOTES
Pt arrived by POV for left shoulder pain. Pt reports yesterday he fell when his leg gave out and he hit his left shoulder on the couch. Pt noted with + radial pulse + sensation but decreased ROM. Pt is awake alert and oriented X4.   Pt educated on ER flow

## 2023-09-26 ENCOUNTER — APPOINTMENT (OUTPATIENT)
Facility: HOSPITAL | Age: 58
DRG: 045 | End: 2023-09-26
Payer: MEDICAID

## 2023-09-26 ENCOUNTER — APPOINTMENT (OUTPATIENT)
Facility: HOSPITAL | Age: 58
End: 2023-09-26
Payer: MEDICAID

## 2023-09-26 ENCOUNTER — HOSPITAL ENCOUNTER (INPATIENT)
Facility: HOSPITAL | Age: 58
LOS: 2 days | Discharge: HOME | DRG: 045 | End: 2023-09-28
Attending: INTERNAL MEDICINE | Admitting: INTERNAL MEDICINE
Payer: MEDICAID

## 2023-09-26 ENCOUNTER — HOSPITAL ENCOUNTER (EMERGENCY)
Facility: HOSPITAL | Age: 58
Discharge: ANOTHER HOSPITAL | End: 2023-09-26
Attending: EMERGENCY MEDICINE
Payer: MEDICAID

## 2023-09-26 VITALS
DIASTOLIC BLOOD PRESSURE: 77 MMHG | WEIGHT: 186.3 LBS | BODY MASS INDEX: 23.92 KG/M2 | SYSTOLIC BLOOD PRESSURE: 139 MMHG | RESPIRATION RATE: 17 BRPM | OXYGEN SATURATION: 99 % | TEMPERATURE: 98.7 F | HEART RATE: 75 BPM

## 2023-09-26 DIAGNOSIS — I63.9 CEREBROVASCULAR ACCIDENT (CVA), UNSPECIFIED MECHANISM (HCC): Primary | ICD-10-CM

## 2023-09-26 DIAGNOSIS — I63.9 ACUTE STROKE DUE TO ISCHEMIA (HCC): ICD-10-CM

## 2023-09-26 LAB
ALBUMIN SERPL-MCNC: 4.1 G/DL (ref 3.5–5)
ALBUMIN/GLOB SERPL: 1.5 (ref 1.1–2.2)
ALP SERPL-CCNC: 67 U/L (ref 45–117)
ALT SERPL-CCNC: 30 U/L (ref 12–78)
ANION GAP SERPL CALC-SCNC: 8 MMOL/L (ref 5–15)
AST SERPL-CCNC: 19 U/L (ref 15–37)
BASOPHILS # BLD: 0 K/UL (ref 0–0.1)
BASOPHILS NFR BLD: 1 % (ref 0–1)
BILIRUB SERPL-MCNC: 0.9 MG/DL (ref 0.2–1)
BUN SERPL-MCNC: 10 MG/DL (ref 6–20)
BUN/CREAT SERPL: 7 (ref 12–20)
CALCIUM SERPL-MCNC: 9.3 MG/DL (ref 8.5–10.1)
CHLORIDE SERPL-SCNC: 101 MMOL/L (ref 97–108)
CO2 SERPL-SCNC: 30 MMOL/L (ref 21–32)
CREAT SERPL-MCNC: 1.5 MG/DL (ref 0.7–1.3)
DIFFERENTIAL METHOD BLD: NORMAL
EOSINOPHIL # BLD: 0.2 K/UL (ref 0–0.4)
EOSINOPHIL NFR BLD: 3 % (ref 0–7)
ERYTHROCYTE [DISTWIDTH] IN BLOOD BY AUTOMATED COUNT: 11.8 % (ref 11.5–14.5)
GLOBULIN SER CALC-MCNC: 2.7 G/DL (ref 2–4)
GLUCOSE BLD STRIP.AUTO-MCNC: 103 MG/DL (ref 65–117)
GLUCOSE SERPL-MCNC: 107 MG/DL (ref 65–100)
HCT VFR BLD AUTO: 42.1 % (ref 36.6–50.3)
HGB BLD-MCNC: 14.1 G/DL (ref 12.1–17)
IMM GRANULOCYTES # BLD AUTO: 0 K/UL (ref 0–0.04)
IMM GRANULOCYTES NFR BLD AUTO: 0 % (ref 0–0.5)
INR PPP: 1 (ref 0.9–1.1)
LYMPHOCYTES # BLD: 3.3 K/UL (ref 0.8–3.5)
LYMPHOCYTES NFR BLD: 48 % (ref 12–49)
MCH RBC QN AUTO: 31.5 PG (ref 26–34)
MCHC RBC AUTO-ENTMCNC: 33.5 G/DL (ref 30–36.5)
MCV RBC AUTO: 94 FL (ref 80–99)
MONOCYTES # BLD: 0.6 K/UL (ref 0–1)
MONOCYTES NFR BLD: 9 % (ref 5–13)
NEUTS SEG # BLD: 2.7 K/UL (ref 1.8–8)
NEUTS SEG NFR BLD: 39 % (ref 32–75)
NRBC # BLD: 0 K/UL (ref 0–0.01)
NRBC BLD-RTO: 0 PER 100 WBC
PLATELET # BLD AUTO: 224 K/UL (ref 150–400)
PMV BLD AUTO: 9.6 FL (ref 8.9–12.9)
POTASSIUM SERPL-SCNC: 4.1 MMOL/L (ref 3.5–5.1)
PROT SERPL-MCNC: 6.8 G/DL (ref 6.4–8.2)
PROTHROMBIN TIME: 10.1 SEC (ref 9–11.1)
RBC # BLD AUTO: 4.48 M/UL (ref 4.1–5.7)
SERVICE CMNT-IMP: NORMAL
SODIUM SERPL-SCNC: 139 MMOL/L (ref 136–145)
TROPONIN I SERPL HS-MCNC: 6 NG/L (ref 0–76)
WBC # BLD AUTO: 6.9 K/UL (ref 4.1–11.1)

## 2023-09-26 PROCEDURE — 96375 TX/PRO/DX INJ NEW DRUG ADDON: CPT

## 2023-09-26 PROCEDURE — 6360000002 HC RX W HCPCS: Performed by: NURSE PRACTITIONER

## 2023-09-26 PROCEDURE — 2580000003 HC RX 258: Performed by: INTERNAL MEDICINE

## 2023-09-26 PROCEDURE — 2580000003 HC RX 258: Performed by: EMERGENCY MEDICINE

## 2023-09-26 PROCEDURE — 84484 ASSAY OF TROPONIN QUANT: CPT

## 2023-09-26 PROCEDURE — 36415 COLL VENOUS BLD VENIPUNCTURE: CPT

## 2023-09-26 PROCEDURE — 96374 THER/PROPH/DIAG INJ IV PUSH: CPT

## 2023-09-26 PROCEDURE — 93005 ELECTROCARDIOGRAM TRACING: CPT | Performed by: EMERGENCY MEDICINE

## 2023-09-26 PROCEDURE — 70450 CT HEAD/BRAIN W/O DYE: CPT

## 2023-09-26 PROCEDURE — APPNB45 APP NON BILLABLE 31-45 MINUTES: Performed by: NURSE PRACTITIONER

## 2023-09-26 PROCEDURE — 93880 EXTRACRANIAL BILAT STUDY: CPT

## 2023-09-26 PROCEDURE — 80053 COMPREHEN METABOLIC PANEL: CPT

## 2023-09-26 PROCEDURE — 70498 CT ANGIOGRAPHY NECK: CPT

## 2023-09-26 PROCEDURE — 85610 PROTHROMBIN TIME: CPT

## 2023-09-26 PROCEDURE — 99285 EMERGENCY DEPT VISIT HI MDM: CPT

## 2023-09-26 PROCEDURE — 2000000000 HC ICU R&B

## 2023-09-26 PROCEDURE — 6360000002 HC RX W HCPCS: Performed by: EMERGENCY MEDICINE

## 2023-09-26 PROCEDURE — 6370000000 HC RX 637 (ALT 250 FOR IP): Performed by: INTERNAL MEDICINE

## 2023-09-26 PROCEDURE — 85025 COMPLETE CBC W/AUTO DIFF WBC: CPT

## 2023-09-26 PROCEDURE — 6360000004 HC RX CONTRAST MEDICATION: Performed by: EMERGENCY MEDICINE

## 2023-09-26 PROCEDURE — 6370000000 HC RX 637 (ALT 250 FOR IP): Performed by: NURSE PRACTITIONER

## 2023-09-26 PROCEDURE — 82962 GLUCOSE BLOOD TEST: CPT

## 2023-09-26 RX ORDER — ONDANSETRON 2 MG/ML
4 INJECTION INTRAMUSCULAR; INTRAVENOUS EVERY 6 HOURS PRN
Status: DISCONTINUED | OUTPATIENT
Start: 2023-09-26 | End: 2023-09-28 | Stop reason: HOSPADM

## 2023-09-26 RX ORDER — ATORVASTATIN CALCIUM 40 MG/1
80 TABLET, FILM COATED ORAL NIGHTLY
Status: DISCONTINUED | OUTPATIENT
Start: 2023-09-26 | End: 2023-09-28 | Stop reason: HOSPADM

## 2023-09-26 RX ORDER — SODIUM CHLORIDE 0.9 % (FLUSH) 0.9 %
5-40 SYRINGE (ML) INJECTION PRN
Status: DISCONTINUED | OUTPATIENT
Start: 2023-09-26 | End: 2023-09-26 | Stop reason: HOSPADM

## 2023-09-26 RX ORDER — SODIUM CHLORIDE 9 MG/ML
INJECTION, SOLUTION INTRAVENOUS PRN
Status: DISCONTINUED | OUTPATIENT
Start: 2023-09-26 | End: 2023-09-28 | Stop reason: HOSPADM

## 2023-09-26 RX ORDER — POLYETHYLENE GLYCOL 3350 17 G/17G
17 POWDER, FOR SOLUTION ORAL DAILY PRN
Status: DISCONTINUED | OUTPATIENT
Start: 2023-09-26 | End: 2023-09-28 | Stop reason: HOSPADM

## 2023-09-26 RX ORDER — FENTANYL CITRATE 50 UG/ML
25 INJECTION, SOLUTION INTRAMUSCULAR; INTRAVENOUS ONCE
Status: COMPLETED | OUTPATIENT
Start: 2023-09-26 | End: 2023-09-26

## 2023-09-26 RX ORDER — FENTANYL CITRATE 50 UG/ML
25 INJECTION, SOLUTION INTRAMUSCULAR; INTRAVENOUS ONCE
Status: DISCONTINUED | OUTPATIENT
Start: 2023-09-26 | End: 2023-09-26

## 2023-09-26 RX ORDER — HYDRALAZINE HYDROCHLORIDE 20 MG/ML
10 INJECTION INTRAMUSCULAR; INTRAVENOUS EVERY 4 HOURS PRN
Status: DISCONTINUED | OUTPATIENT
Start: 2023-09-26 | End: 2023-09-28 | Stop reason: HOSPADM

## 2023-09-26 RX ORDER — ASPIRIN 300 MG/1
300 SUPPOSITORY RECTAL DAILY
Status: DISCONTINUED | OUTPATIENT
Start: 2023-09-27 | End: 2023-09-27

## 2023-09-26 RX ORDER — ONDANSETRON 4 MG/1
4 TABLET, ORALLY DISINTEGRATING ORAL EVERY 8 HOURS PRN
Status: DISCONTINUED | OUTPATIENT
Start: 2023-09-26 | End: 2023-09-28 | Stop reason: HOSPADM

## 2023-09-26 RX ORDER — ACETAMINOPHEN 650 MG/1
650 SUPPOSITORY RECTAL EVERY 4 HOURS PRN
Status: DISCONTINUED | OUTPATIENT
Start: 2023-09-26 | End: 2023-09-27

## 2023-09-26 RX ORDER — BUTALBITAL, ACETAMINOPHEN AND CAFFEINE 50; 325; 40 MG/1; MG/1; MG/1
1 TABLET ORAL ONCE
Status: COMPLETED | OUTPATIENT
Start: 2023-09-26 | End: 2023-09-26

## 2023-09-26 RX ORDER — SODIUM CHLORIDE 9 MG/ML
INJECTION, SOLUTION INTRAVENOUS PRN
Status: DISCONTINUED | OUTPATIENT
Start: 2023-09-26 | End: 2023-09-26 | Stop reason: HOSPADM

## 2023-09-26 RX ORDER — ONDANSETRON 2 MG/ML
4 INJECTION INTRAMUSCULAR; INTRAVENOUS ONCE
Status: COMPLETED | OUTPATIENT
Start: 2023-09-26 | End: 2023-09-26

## 2023-09-26 RX ORDER — ASPIRIN 81 MG/1
81 TABLET, CHEWABLE ORAL DAILY
Status: DISCONTINUED | OUTPATIENT
Start: 2023-09-27 | End: 2023-09-27

## 2023-09-26 RX ORDER — SODIUM CHLORIDE 0.9 % (FLUSH) 0.9 %
5-40 SYRINGE (ML) INJECTION EVERY 12 HOURS SCHEDULED
Status: DISCONTINUED | OUTPATIENT
Start: 2023-09-26 | End: 2023-09-26 | Stop reason: HOSPADM

## 2023-09-26 RX ORDER — SODIUM CHLORIDE 0.9 % (FLUSH) 0.9 %
10 SYRINGE (ML) INJECTION ONCE
Status: COMPLETED | OUTPATIENT
Start: 2023-09-26 | End: 2023-09-26

## 2023-09-26 RX ORDER — ACETAMINOPHEN 325 MG/1
650 TABLET ORAL EVERY 4 HOURS PRN
Status: DISCONTINUED | OUTPATIENT
Start: 2023-09-26 | End: 2023-09-27

## 2023-09-26 RX ORDER — LABETALOL HYDROCHLORIDE 5 MG/ML
5 INJECTION, SOLUTION INTRAVENOUS ONCE
Status: DISCONTINUED | OUTPATIENT
Start: 2023-09-26 | End: 2023-09-26 | Stop reason: HOSPADM

## 2023-09-26 RX ORDER — ENOXAPARIN SODIUM 100 MG/ML
40 INJECTION SUBCUTANEOUS DAILY
Status: DISCONTINUED | OUTPATIENT
Start: 2023-09-27 | End: 2023-09-27

## 2023-09-26 RX ORDER — SODIUM CHLORIDE 0.9 % (FLUSH) 0.9 %
5-40 SYRINGE (ML) INJECTION PRN
Status: DISCONTINUED | OUTPATIENT
Start: 2023-09-26 | End: 2023-09-28 | Stop reason: HOSPADM

## 2023-09-26 RX ORDER — SODIUM CHLORIDE 0.9 % (FLUSH) 0.9 %
5-40 SYRINGE (ML) INJECTION EVERY 12 HOURS SCHEDULED
Status: DISCONTINUED | OUTPATIENT
Start: 2023-09-26 | End: 2023-09-28 | Stop reason: HOSPADM

## 2023-09-26 RX ORDER — ALBUTEROL SULFATE 2.5 MG/3ML
2.5 SOLUTION RESPIRATORY (INHALATION) EVERY 6 HOURS PRN
Status: DISCONTINUED | OUTPATIENT
Start: 2023-09-26 | End: 2023-09-28 | Stop reason: HOSPADM

## 2023-09-26 RX ADMIN — ATORVASTATIN CALCIUM 80 MG: 40 TABLET, FILM COATED ORAL at 21:14

## 2023-09-26 RX ADMIN — BUTALBITAL, ACETAMINOPHEN, AND CAFFEINE 1 TABLET: 50; 325; 40 TABLET ORAL at 21:25

## 2023-09-26 RX ADMIN — IOPAMIDOL 100 ML: 755 INJECTION, SOLUTION INTRAVENOUS at 14:54

## 2023-09-26 RX ADMIN — SODIUM CHLORIDE, PRESERVATIVE FREE 10 ML: 5 INJECTION INTRAVENOUS at 14:28

## 2023-09-26 RX ADMIN — ACETAMINOPHEN 650 MG: 325 TABLET ORAL at 19:06

## 2023-09-26 RX ADMIN — SODIUM CHLORIDE, PRESERVATIVE FREE 10 ML: 5 INJECTION INTRAVENOUS at 14:30

## 2023-09-26 RX ADMIN — FENTANYL CITRATE 25 MCG: 50 INJECTION, SOLUTION INTRAMUSCULAR; INTRAVENOUS at 22:59

## 2023-09-26 RX ADMIN — SODIUM CHLORIDE, PRESERVATIVE FREE 10 ML: 5 INJECTION INTRAVENOUS at 20:46

## 2023-09-26 RX ADMIN — ONDANSETRON 4 MG: 2 INJECTION INTRAMUSCULAR; INTRAVENOUS at 14:38

## 2023-09-26 RX ADMIN — Medication 21 MG: at 14:27

## 2023-09-26 ASSESSMENT — PAIN DESCRIPTION - DESCRIPTORS
DESCRIPTORS: ACHING
DESCRIPTORS: ACHING

## 2023-09-26 ASSESSMENT — PAIN DESCRIPTION - ORIENTATION
ORIENTATION: RIGHT
ORIENTATION: LEFT
ORIENTATION: RIGHT

## 2023-09-26 ASSESSMENT — PATIENT HEALTH QUESTIONNAIRE - PHQ9
SUM OF ALL RESPONSES TO PHQ QUESTIONS 1-9: 0
2. FEELING DOWN, DEPRESSED OR HOPELESS: 0
1. LITTLE INTEREST OR PLEASURE IN DOING THINGS: 0
SUM OF ALL RESPONSES TO PHQ9 QUESTIONS 1 & 2: 0
SUM OF ALL RESPONSES TO PHQ QUESTIONS 1-9: 0

## 2023-09-26 ASSESSMENT — PAIN SCALES - GENERAL
PAINLEVEL_OUTOF10: 0
PAINLEVEL_OUTOF10: 7
PAINLEVEL_OUTOF10: 7
PAINLEVEL_OUTOF10: 3
PAINLEVEL_OUTOF10: 7

## 2023-09-26 ASSESSMENT — PAIN DESCRIPTION - LOCATION
LOCATION: HEAD

## 2023-09-26 ASSESSMENT — PAIN DESCRIPTION - ONSET: ONSET: ON-GOING

## 2023-09-26 ASSESSMENT — ENCOUNTER SYMPTOMS
ABDOMINAL DISTENTION: 0
SHORTNESS OF BREATH: 0
ABDOMINAL PAIN: 0

## 2023-09-26 ASSESSMENT — PAIN DESCRIPTION - DIRECTION: RADIATING_TOWARDS: R EYE

## 2023-09-26 ASSESSMENT — PAIN DESCRIPTION - FREQUENCY: FREQUENCY: INTERMITTENT

## 2023-09-26 NOTE — ED TRIAGE NOTES
Pt arrives as a transfer code stroke  LKW 11am  Had right sided weakness and facial droop  To [facility] ER at 1pm and was given 21mg of TNK and 4mg zofran. Bilat 20g and had CTA prior to transfer. Pt is A&Ox4 and GCS 15.  /94 HR 71

## 2023-09-26 NOTE — ED NOTES
TRANSFER - IN REPORT:    Verbal report received from [address] on Bruna Wiggins.  being received from [address] for routine progression of patient care      Report consisted of patient's Situation, Background, Assessment and   Recommendations(SBAR). Information from the following report(s) Nurse Handoff Report, Index, ED Encounter Summary, ED SBAR, Adult Overview, Surgery Report, Intake/Output, MAR, and Recent Results was reviewed with the receiving nurse. Opportunity for questions and clarification was provided. Assessment completed upon patient's arrival to unit and care assumed.         Warren Burnett RN  09/26/23 9412

## 2023-09-26 NOTE — ED PROVIDER NOTES
EMERGENCY DEPARTMENT HISTORY AND PHYSICAL EXAM      Date: 9/26/2023  Patient Name: Makenzie Raya. History of Presenting Illness     Chief Complaint   Patient presents with    Stroke       History Provided By: Patient    HPI: Makenzie Mcrae, 59 y.o. male with PMHx as noted below presents emergency department chief complaint of right arm numbness, weakness as well as right-sided facial droop. Patient reported onset of symptoms acutely approximately 1 hour prior to arrival.  Patient denies any trauma, no anticoagulation. PCP: Chasity Bryant MD    No current facility-administered medications for this encounter. No current outpatient medications on file.      Facility-Administered Medications Ordered in Other Encounters   Medication Dose Route Frequency Provider Last Rate Last Admin    sodium chloride flush 0.9 % injection 5-40 mL  5-40 mL IntraVENous 2 times per day Kris Caldwell MD        sodium chloride flush 0.9 % injection 5-40 mL  5-40 mL IntraVENous PRN Kris Caldwell MD        0.9 % sodium chloride infusion   IntraVENous PRN Kris Caldwell MD        ondansetron (ZOFRAN-ODT) disintegrating tablet 4 mg  4 mg Oral Q8H PRN Kris Caldwell MD        Or    ondansetron [facility]) injection 4 mg  4 mg IntraVENous Q6H PRN Kris Caldwell MD        polyethylene glycol [facility]) packet 17 g  17 g Oral Daily PRN Kris Caldwell MD        [START ON 9/27/2023] enoxaparin (LOVENOX) injection 40 mg  40 mg SubCUTAneous Daily Kris Caldwell MD        [START ON 9/27/2023] aspirin chewable tablet 81 mg  81 mg Oral Daily Kris Caldwell MD        Or    Domi Sanz ON 9/27/2023] aspirin suppository 300 mg  300 mg Rectal Daily Kris Caldwell MD        acetaminophen (TYLENOL) tablet 650 mg  650 mg Oral Q4H PRN Kris Caldwell MD   650 mg at 09/26/23 1906    Or    acetaminophen (TYLENOL) suppository 650 mg  650 mg Rectal Q4H PRN Kris Caldwell MD        hydrALAZINE (APRESOLINE) injection 10 mg  10 mg IntraVENous Q4H PRN Kris Caldwell MD

## 2023-09-26 NOTE — ED NOTES
TRANSFER - OUT REPORT:    Verbal report given to Elmer Lepe on Ligia Pritchett.  being transferred to [facility] for routine progression of patient care       Report consisted of patient's Situation, Background, Assessment and   Recommendations(SBAR). Information from the following report(s) Nurse Handoff Report, Index, ED Encounter Summary, and ED SBAR was reviewed with the receiving nurse. [place] Fall Assessment:    Presents to emergency department  because of falls (Syncope, seizure, or loss of consciousness): No  Age > 70: No  Altered Mental Status, Intoxication with alcohol or substance confusion (Disorientation, impaired judgment, poor safety awaremess, or inability to follow instructions): No  Impaired Mobility: Ambulates or transfers with assistive devices or assistance; Unable to ambulate or transer.: Yes  Nursing Judgement: Yes          Lines:   Peripheral IV 09/26/23 Distal;Right Antecubital (Active)       Peripheral IV 09/26/23 Left Forearm (Active)   Site Assessment Clean, dry & intact 09/26/23 1424   Phlebitis Assessment No symptoms 09/26/23 1424   Infiltration Assessment 0 09/26/23 1424        Opportunity for questions and clarification was provided.       Patient transported with:  Monitor           Angela Barahona RN  09/26/23 2624

## 2023-09-26 NOTE — PROGRESS NOTES
ALS transport arranged via [id number] [facility])  for transfer to [facility] for higher level of care. To include cardiac monitoring and neuro checks. [facility] on scene.

## 2023-09-26 NOTE — ED TRIAGE NOTES
Pt arrived with c/o right sided arm and leg weakness and numbness, as well as right sided facial droop, that started around 11am today. States he sat down in his recliner chair and this occurred, he does not take any blood thinners.

## 2023-09-27 ENCOUNTER — APPOINTMENT (OUTPATIENT)
Facility: HOSPITAL | Age: 58
DRG: 045 | End: 2023-09-27
Attending: INTERNAL MEDICINE
Payer: MEDICAID

## 2023-09-27 ENCOUNTER — APPOINTMENT (OUTPATIENT)
Facility: HOSPITAL | Age: 58
DRG: 045 | End: 2023-09-27
Payer: MEDICAID

## 2023-09-27 LAB
ANION GAP SERPL CALC-SCNC: 5 MMOL/L (ref 5–15)
BUN SERPL-MCNC: 8 MG/DL (ref 6–20)
BUN/CREAT SERPL: 6 (ref 12–20)
CALCIUM SERPL-MCNC: 8.5 MG/DL (ref 8.5–10.1)
CHLORIDE SERPL-SCNC: 107 MMOL/L (ref 97–108)
CHOLEST SERPL-MCNC: 156 MG/DL
CO2 SERPL-SCNC: 26 MMOL/L (ref 21–32)
CREAT SERPL-MCNC: 1.36 MG/DL (ref 0.7–1.3)
ECHO LA DIAMETER: 2.9 CM
ECHO LA VOL 4C: 48 ML (ref 18–58)
ECHO LA VOL 4C: 54 ML (ref 18–58)
ECHO LV E' LATERAL VELOCITY: 11 CM/S
ECHO LV FRACTIONAL SHORTENING: 48 % (ref 28–44)
ECHO LV INTERNAL DIMENSION DIASTOLIC: 4.8 CM (ref 4.2–5.9)
ECHO LV INTERNAL DIMENSION SYSTOLIC: 2.5 CM
ECHO LV IVSD: 0.8 CM (ref 0.6–1)
ECHO LV MASS 2D: 126.7 G (ref 88–224)
ECHO LV POSTERIOR WALL DIASTOLIC: 0.8 CM (ref 0.6–1)
ECHO LV RELATIVE WALL THICKNESS RATIO: 0.33
ECHO LVOT AREA: 2.8 CM2
ECHO LVOT DIAM: 1.9 CM
ECHO MV A VELOCITY: 0.52 M/S
ECHO MV E VELOCITY: 0.67 M/S
ECHO MV E/A RATIO: 1.29
ECHO MV E/E' LATERAL: 6.09
ECHO RV INTERNAL DIMENSION: 2.7 CM
ERYTHROCYTE [DISTWIDTH] IN BLOOD BY AUTOMATED COUNT: 11.7 % (ref 11.5–14.5)
EST. AVERAGE GLUCOSE BLD GHB EST-MCNC: 88 MG/DL
GLUCOSE SERPL-MCNC: 125 MG/DL (ref 65–100)
HBA1C MFR BLD: 4.7 % (ref 4–5.6)
HCT VFR BLD AUTO: 39.9 % (ref 36.6–50.3)
HDLC SERPL-MCNC: 49 MG/DL
HDLC SERPL: 3.2 (ref 0–5)
HGB BLD-MCNC: 13.5 G/DL (ref 12.1–17)
LDLC SERPL CALC-MCNC: 80.8 MG/DL (ref 0–100)
MCH RBC QN AUTO: 32.2 PG (ref 26–34)
MCHC RBC AUTO-ENTMCNC: 33.8 G/DL (ref 30–36.5)
MCV RBC AUTO: 95.2 FL (ref 80–99)
NRBC # BLD: 0 K/UL (ref 0–0.01)
NRBC BLD-RTO: 0 PER 100 WBC
PLATELET # BLD AUTO: 217 K/UL (ref 150–400)
PMV BLD AUTO: 10.3 FL (ref 8.9–12.9)
POTASSIUM SERPL-SCNC: 3.5 MMOL/L (ref 3.5–5.1)
RBC # BLD AUTO: 4.19 M/UL (ref 4.1–5.7)
SODIUM SERPL-SCNC: 138 MMOL/L (ref 136–145)
TRIGL SERPL-MCNC: 131 MG/DL
TROPONIN I SERPL HS-MCNC: 9 NG/L (ref 0–76)
TSH SERPL DL<=0.05 MIU/L-ACNC: 1.22 UIU/ML (ref 0.36–3.74)
VLDLC SERPL CALC-MCNC: 26.2 MG/DL
WBC # BLD AUTO: 6.5 K/UL (ref 4.1–11.1)

## 2023-09-27 PROCEDURE — 36415 COLL VENOUS BLD VENIPUNCTURE: CPT

## 2023-09-27 PROCEDURE — 80048 BASIC METABOLIC PNL TOTAL CA: CPT

## 2023-09-27 PROCEDURE — 6370000000 HC RX 637 (ALT 250 FOR IP): Performed by: NURSE PRACTITIONER

## 2023-09-27 PROCEDURE — 2580000003 HC RX 258: Performed by: INTERNAL MEDICINE

## 2023-09-27 PROCEDURE — 2580000003 HC RX 258: Performed by: NURSE PRACTITIONER

## 2023-09-27 PROCEDURE — 80061 LIPID PANEL: CPT

## 2023-09-27 PROCEDURE — 93306 TTE W/DOPPLER COMPLETE: CPT

## 2023-09-27 PROCEDURE — 6360000002 HC RX W HCPCS: Performed by: NURSE PRACTITIONER

## 2023-09-27 PROCEDURE — 6370000000 HC RX 637 (ALT 250 FOR IP): Performed by: INTERNAL MEDICINE

## 2023-09-27 PROCEDURE — 2000000000 HC ICU R&B

## 2023-09-27 PROCEDURE — 85027 COMPLETE CBC AUTOMATED: CPT

## 2023-09-27 PROCEDURE — 2500000003 HC RX 250 WO HCPCS: Performed by: NURSE PRACTITIONER

## 2023-09-27 PROCEDURE — 84443 ASSAY THYROID STIM HORMONE: CPT

## 2023-09-27 PROCEDURE — 93005 ELECTROCARDIOGRAM TRACING: CPT | Performed by: NURSE PRACTITIONER

## 2023-09-27 PROCEDURE — 70551 MRI BRAIN STEM W/O DYE: CPT

## 2023-09-27 PROCEDURE — 84484 ASSAY OF TROPONIN QUANT: CPT

## 2023-09-27 PROCEDURE — 83036 HEMOGLOBIN GLYCOSYLATED A1C: CPT

## 2023-09-27 RX ORDER — FENTANYL CITRATE 50 UG/ML
25 INJECTION, SOLUTION INTRAMUSCULAR; INTRAVENOUS ONCE
Status: COMPLETED | OUTPATIENT
Start: 2023-09-27 | End: 2023-09-27

## 2023-09-27 RX ORDER — NITROGLYCERIN 0.4 MG/1
0.4 TABLET SUBLINGUAL EVERY 5 MIN PRN
Status: DISCONTINUED | OUTPATIENT
Start: 2023-09-27 | End: 2023-09-28 | Stop reason: HOSPADM

## 2023-09-27 RX ORDER — ACETAMINOPHEN 500 MG
1000 TABLET ORAL EVERY 4 HOURS PRN
Status: DISCONTINUED | OUTPATIENT
Start: 2023-09-27 | End: 2023-09-27

## 2023-09-27 RX ORDER — MAGNESIUM SULFATE 1 G/100ML
1000 INJECTION INTRAVENOUS ONCE
Status: DISCONTINUED | OUTPATIENT
Start: 2023-09-27 | End: 2023-09-27

## 2023-09-27 RX ORDER — ONDANSETRON 2 MG/ML
4 INJECTION INTRAMUSCULAR; INTRAVENOUS ONCE
Status: COMPLETED | OUTPATIENT
Start: 2023-09-27 | End: 2023-09-27

## 2023-09-27 RX ORDER — TRAMADOL HYDROCHLORIDE 50 MG/1
50 TABLET ORAL EVERY 6 HOURS PRN
Status: DISCONTINUED | OUTPATIENT
Start: 2023-09-27 | End: 2023-09-28

## 2023-09-27 RX ORDER — KETOROLAC TROMETHAMINE 30 MG/ML
15 INJECTION, SOLUTION INTRAMUSCULAR; INTRAVENOUS ONCE
Status: DISCONTINUED | OUTPATIENT
Start: 2023-09-27 | End: 2023-09-28 | Stop reason: HOSPADM

## 2023-09-27 RX ORDER — PROCHLORPERAZINE EDISYLATE 5 MG/ML
10 INJECTION INTRAMUSCULAR; INTRAVENOUS EVERY 6 HOURS PRN
Status: DISCONTINUED | OUTPATIENT
Start: 2023-09-27 | End: 2023-09-28 | Stop reason: HOSPADM

## 2023-09-27 RX ORDER — CLOPIDOGREL BISULFATE 75 MG/1
75 TABLET ORAL DAILY
Status: DISCONTINUED | OUTPATIENT
Start: 2023-09-27 | End: 2023-09-28 | Stop reason: HOSPADM

## 2023-09-27 RX ORDER — DEXAMETHASONE SODIUM PHOSPHATE 4 MG/ML
10 INJECTION, SOLUTION INTRA-ARTICULAR; INTRALESIONAL; INTRAMUSCULAR; INTRAVENOUS; SOFT TISSUE ONCE
Status: COMPLETED | OUTPATIENT
Start: 2023-09-27 | End: 2023-09-27

## 2023-09-27 RX ORDER — ACETAMINOPHEN 500 MG
1000 TABLET ORAL EVERY 6 HOURS PRN
Status: DISCONTINUED | OUTPATIENT
Start: 2023-09-27 | End: 2023-09-28 | Stop reason: HOSPADM

## 2023-09-27 RX ORDER — MAGNESIUM SULFATE 1 G/100ML
1000 INJECTION INTRAVENOUS ONCE
Status: DISCONTINUED | OUTPATIENT
Start: 2023-09-28 | End: 2023-09-28

## 2023-09-27 RX ORDER — NITROGLYCERIN 0.3 MG/1
0.3 TABLET SUBLINGUAL EVERY 5 MIN PRN
Status: DISCONTINUED | OUTPATIENT
Start: 2023-09-27 | End: 2023-09-27 | Stop reason: CLARIF

## 2023-09-27 RX ORDER — FAMOTIDINE 20 MG/1
20 TABLET, FILM COATED ORAL 2 TIMES DAILY
Status: DISCONTINUED | OUTPATIENT
Start: 2023-09-27 | End: 2023-09-28 | Stop reason: HOSPADM

## 2023-09-27 RX ORDER — POTASSIUM CHLORIDE 750 MG/1
40 TABLET, FILM COATED, EXTENDED RELEASE ORAL ONCE
Status: COMPLETED | OUTPATIENT
Start: 2023-09-27 | End: 2023-09-27

## 2023-09-27 RX ORDER — HEPARIN SODIUM 5000 [USP'U]/ML
5000 INJECTION, SOLUTION INTRAVENOUS; SUBCUTANEOUS 2 TIMES DAILY
Status: DISCONTINUED | OUTPATIENT
Start: 2023-09-27 | End: 2023-09-28 | Stop reason: HOSPADM

## 2023-09-27 RX ORDER — ACETAMINOPHEN 650 MG/1
650 SUPPOSITORY RECTAL EVERY 4 HOURS PRN
Status: DISCONTINUED | OUTPATIENT
Start: 2023-09-27 | End: 2023-09-27

## 2023-09-27 RX ORDER — NICOTINE 21 MG/24HR
1 PATCH, TRANSDERMAL 24 HOURS TRANSDERMAL DAILY
Status: DISCONTINUED | OUTPATIENT
Start: 2023-09-27 | End: 2023-09-28 | Stop reason: HOSPADM

## 2023-09-27 RX ORDER — TRAZODONE HYDROCHLORIDE 50 MG/1
50 TABLET ORAL NIGHTLY PRN
Status: DISCONTINUED | OUTPATIENT
Start: 2023-09-27 | End: 2023-09-28 | Stop reason: HOSPADM

## 2023-09-27 RX ORDER — MAGNESIUM SULFATE IN WATER 40 MG/ML
2000 INJECTION, SOLUTION INTRAVENOUS ONCE
Status: COMPLETED | OUTPATIENT
Start: 2023-09-27 | End: 2023-09-27

## 2023-09-27 RX ORDER — ASPIRIN 81 MG/1
81 TABLET, CHEWABLE ORAL EVERY EVENING
Status: DISCONTINUED | OUTPATIENT
Start: 2023-09-27 | End: 2023-09-28 | Stop reason: HOSPADM

## 2023-09-27 RX ORDER — PROCHLORPERAZINE EDISYLATE 5 MG/ML
10 INJECTION INTRAMUSCULAR; INTRAVENOUS ONCE
Status: DISCONTINUED | OUTPATIENT
Start: 2023-09-27 | End: 2023-09-28 | Stop reason: HOSPADM

## 2023-09-27 RX ORDER — DIPHENHYDRAMINE HYDROCHLORIDE 50 MG/ML
25 INJECTION INTRAMUSCULAR; INTRAVENOUS ONCE
Status: COMPLETED | OUTPATIENT
Start: 2023-09-27 | End: 2023-09-27

## 2023-09-27 RX ORDER — ACETAMINOPHEN 650 MG/1
650 SUPPOSITORY RECTAL EVERY 4 HOURS PRN
Status: DISCONTINUED | OUTPATIENT
Start: 2023-09-27 | End: 2023-09-28 | Stop reason: HOSPADM

## 2023-09-27 RX ADMIN — POTASSIUM CHLORIDE 40 MEQ: 750 TABLET, FILM COATED, EXTENDED RELEASE ORAL at 05:34

## 2023-09-27 RX ADMIN — HEPARIN SODIUM 5000 UNITS: 5000 INJECTION INTRAVENOUS; SUBCUTANEOUS at 18:12

## 2023-09-27 RX ADMIN — ONDANSETRON 4 MG: 2 INJECTION INTRAMUSCULAR; INTRAVENOUS at 09:40

## 2023-09-27 RX ADMIN — ASPIRIN 81 MG CHEWABLE TABLET 81 MG: 81 TABLET CHEWABLE at 18:11

## 2023-09-27 RX ADMIN — SODIUM CHLORIDE, PRESERVATIVE FREE 10 ML: 5 INJECTION INTRAVENOUS at 23:16

## 2023-09-27 RX ADMIN — TRAMADOL HYDROCHLORIDE 50 MG: 50 TABLET ORAL at 08:11

## 2023-09-27 RX ADMIN — SODIUM CHLORIDE, PRESERVATIVE FREE 10 ML: 5 INJECTION INTRAVENOUS at 09:52

## 2023-09-27 RX ADMIN — MAGNESIUM SULFATE HEPTAHYDRATE 2000 MG: 40 INJECTION, SOLUTION INTRAVENOUS at 09:44

## 2023-09-27 RX ADMIN — ACETAMINOPHEN 650 MG: 325 TABLET ORAL at 12:50

## 2023-09-27 RX ADMIN — DEXAMETHASONE SODIUM PHOSPHATE 10 MG: 4 INJECTION INTRA-ARTICULAR; INTRALESIONAL; INTRAMUSCULAR; INTRAVENOUS; SOFT TISSUE at 09:42

## 2023-09-27 RX ADMIN — PROCHLORPERAZINE EDISYLATE 10 MG: 5 INJECTION INTRAMUSCULAR; INTRAVENOUS at 23:17

## 2023-09-27 RX ADMIN — CLOPIDOGREL BISULFATE 75 MG: 75 TABLET ORAL at 18:11

## 2023-09-27 RX ADMIN — DIPHENHYDRAMINE HYDROCHLORIDE 25 MG: 50 INJECTION INTRAMUSCULAR; INTRAVENOUS at 23:16

## 2023-09-27 RX ADMIN — VALPROATE SODIUM 1000 MG: 100 INJECTION, SOLUTION INTRAVENOUS at 16:38

## 2023-09-27 RX ADMIN — FAMOTIDINE 20 MG: 20 TABLET ORAL at 21:10

## 2023-09-27 RX ADMIN — PROCHLORPERAZINE EDISYLATE 10 MG: 5 INJECTION INTRAMUSCULAR; INTRAVENOUS at 11:42

## 2023-09-27 RX ADMIN — FENTANYL CITRATE 25 MCG: 50 INJECTION INTRAMUSCULAR; INTRAVENOUS at 02:50

## 2023-09-27 RX ADMIN — ATORVASTATIN CALCIUM 80 MG: 40 TABLET, FILM COATED ORAL at 21:10

## 2023-09-27 RX ADMIN — FENTANYL CITRATE 25 MCG: 0.05 INJECTION, SOLUTION INTRAMUSCULAR; INTRAVENOUS at 06:00

## 2023-09-27 RX ADMIN — FAMOTIDINE 20 MG: 20 TABLET ORAL at 14:25

## 2023-09-27 ASSESSMENT — PAIN SCALES - GENERAL
PAINLEVEL_OUTOF10: 4
PAINLEVEL_OUTOF10: 2
PAINLEVEL_OUTOF10: 5
PAINLEVEL_OUTOF10: 7
PAINLEVEL_OUTOF10: 3
PAINLEVEL_OUTOF10: 4
PAINLEVEL_OUTOF10: 7
PAINLEVEL_OUTOF10: 8

## 2023-09-27 ASSESSMENT — PAIN DESCRIPTION - DESCRIPTORS
DESCRIPTORS: ACHING
DESCRIPTORS: DULL
DESCRIPTORS: ACHING

## 2023-09-27 ASSESSMENT — PAIN DESCRIPTION - ORIENTATION
ORIENTATION: RIGHT
ORIENTATION: RIGHT;ANTERIOR
ORIENTATION: RIGHT
ORIENTATION: RIGHT;ANTERIOR
ORIENTATION: RIGHT;ANTERIOR

## 2023-09-27 ASSESSMENT — PAIN DESCRIPTION - LOCATION
LOCATION: HEAD

## 2023-09-27 NOTE — CARE COORDINATION
Care Management Initial Assessment       RUR:Calculating   Readmission? No     09/27/23 1040   Service Assessment   Patient Orientation Alert and Oriented;Person;Place;Situation;Self   Cognition Alert   History Provided By Patient   Primary Caregiver Self   Support Systems Spouse/Significant Other  (girlfriend Noah Welsh [phone], Son  Reyes Breeding. [address]))   [address] is: Legal Next of [address] [facility]   PCP Verified by CM Yes  (Dr Valentin Zhu)   Last Visit to PCP Within last 3 months   Prior Functional Level Independent in ADLs/IADLs   Current Functional Level Independent in ADLs/IADLs   Can patient return to prior living arrangement Yes   Family able to assist with home care needs: Yes   Would you like for me to discuss the discharge plan with any other family members/significant others, and if so, who? No   Financial Resources None   [employer] None   Social/Functional History   Lives With Significant other   Home Layout One level   Home Access Stairs to enter with rails   Entrance Stairs - Number of Steps 5   Entrance Stairs - Rails Both   ADL Assistance Independent   Homemaking Assistance Independent   Ambulation Assistance Independent   Transfer Assistance Independent   Active  Yes   Mode of Transportation Car   Occupation On disability   Discharge Planning   Type of Residence House   Living Arrangements Spouse/Significant Other   Current Services Prior To Admission None   Potential Assistance Purchasing Medications No   One/Two Story Residence One story   History of falls? 0     Care manager met with patient to introduce self and explain role. Per patient he has a cane and a walker, uses the cane occasionally. No HH or IPR needs. He sees his PCP Dr Darling Howard every 3 months and uses the [facility] in [place]. Care manager will follow for transitions of care.    Titus Rea RN,Care Management

## 2023-09-27 NOTE — PLAN OF CARE
Problem: Discharge Planning  Goal: Discharge to home or other facility with appropriate resources  Outcome: Progressing  Flowsheets (Taken 9/26/2023 2000)  Discharge to home or other facility with appropriate resources:   Identify barriers to discharge with patient and caregiver   Arrange for needed discharge resources and transportation as appropriate   Identify discharge learning needs (meds, wound care, etc)     Problem: Pain  Goal: Verbalizes/displays adequate comfort level or baseline comfort level  Outcome: Progressing  Flowsheets (Taken 9/26/2023 2000)  Verbalizes/displays adequate comfort level or baseline comfort level:   Encourage patient to monitor pain and request assistance   Assess pain using appropriate pain scale   Administer analgesics based on type and severity of pain and evaluate response   Implement non-pharmacological measures as appropriate and evaluate response     Problem: Safety - Adult  Goal: Free from fall injury  Outcome: Progressing

## 2023-09-28 ENCOUNTER — APPOINTMENT (OUTPATIENT)
Facility: HOSPITAL | Age: 58
DRG: 045 | End: 2023-09-28
Payer: MEDICAID

## 2023-09-28 VITALS
TEMPERATURE: 98 F | OXYGEN SATURATION: 100 % | SYSTOLIC BLOOD PRESSURE: 144 MMHG | HEART RATE: 66 BPM | WEIGHT: 186.73 LBS | DIASTOLIC BLOOD PRESSURE: 88 MMHG | RESPIRATION RATE: 18 BRPM | BODY MASS INDEX: 23.97 KG/M2

## 2023-09-28 PROBLEM — I65.23 INTERNAL CAROTID ARTERY STENOSIS, BILATERAL: Status: ACTIVE | Noted: 2023-09-28

## 2023-09-28 LAB
EKG ATRIAL RATE: 62 BPM
EKG DIAGNOSIS: NORMAL
EKG P AXIS: 49 DEGREES
EKG P-R INTERVAL: 152 MS
EKG Q-T INTERVAL: 420 MS
EKG QRS DURATION: 88 MS
EKG QTC CALCULATION (BAZETT): 426 MS
EKG R AXIS: 1 DEGREES
EKG T AXIS: 2 DEGREES
EKG VENTRICULAR RATE: 62 BPM

## 2023-09-28 PROCEDURE — 6370000000 HC RX 637 (ALT 250 FOR IP): Performed by: NURSE PRACTITIONER

## 2023-09-28 PROCEDURE — 6360000002 HC RX W HCPCS: Performed by: NURSE PRACTITIONER

## 2023-09-28 PROCEDURE — 2580000003 HC RX 258: Performed by: INTERNAL MEDICINE

## 2023-09-28 PROCEDURE — 97165 OT EVAL LOW COMPLEX 30 MIN: CPT

## 2023-09-28 PROCEDURE — 97161 PT EVAL LOW COMPLEX 20 MIN: CPT

## 2023-09-28 PROCEDURE — 93246 EXT ECG>7D<15D RECORDING: CPT

## 2023-09-28 PROCEDURE — 97116 GAIT TRAINING THERAPY: CPT

## 2023-09-28 PROCEDURE — 6370000000 HC RX 637 (ALT 250 FOR IP)

## 2023-09-28 PROCEDURE — 97530 THERAPEUTIC ACTIVITIES: CPT

## 2023-09-28 RX ORDER — LOSARTAN POTASSIUM 50 MG/1
50 TABLET ORAL DAILY
Qty: 30 TABLET | Refills: 1 | Status: SHIPPED | OUTPATIENT
Start: 2023-09-28 | End: 2024-09-27

## 2023-09-28 RX ORDER — ATORVASTATIN CALCIUM 80 MG/1
80 TABLET, FILM COATED ORAL NIGHTLY
Qty: 30 TABLET | Refills: 1 | Status: SHIPPED | OUTPATIENT
Start: 2023-09-28

## 2023-09-28 RX ORDER — FAMOTIDINE 20 MG/1
20 TABLET, FILM COATED ORAL 2 TIMES DAILY
Qty: 60 TABLET | Refills: 1 | Status: SHIPPED | OUTPATIENT
Start: 2023-09-28

## 2023-09-28 RX ORDER — LOSARTAN POTASSIUM 50 MG/1
50 TABLET ORAL DAILY
Status: DISCONTINUED | OUTPATIENT
Start: 2023-09-28 | End: 2023-09-28 | Stop reason: HOSPADM

## 2023-09-28 RX ORDER — LOSARTAN POTASSIUM 50 MG/1
50 TABLET ORAL DAILY
Qty: 30 TABLET | Refills: 11 | Status: ON HOLD | COMMUNITY
Start: 2023-01-05 | End: 2023-09-28 | Stop reason: SDUPTHER

## 2023-09-28 RX ORDER — OXYCODONE HYDROCHLORIDE 5 MG/1
5 TABLET ORAL EVERY 6 HOURS PRN
Qty: 12 TABLET | Refills: 0 | Status: SHIPPED | OUTPATIENT
Start: 2023-09-28 | End: 2023-10-01

## 2023-09-28 RX ORDER — OXYCODONE HYDROCHLORIDE 5 MG/1
5 TABLET ORAL EVERY 6 HOURS PRN
Status: DISCONTINUED | OUTPATIENT
Start: 2023-09-28 | End: 2023-09-28 | Stop reason: HOSPADM

## 2023-09-28 RX ORDER — OXYCODONE HYDROCHLORIDE 5 MG/1
5 TABLET ORAL EVERY 6 HOURS PRN
Status: DISCONTINUED | OUTPATIENT
Start: 2023-09-28 | End: 2023-09-28

## 2023-09-28 RX ORDER — CLOPIDOGREL BISULFATE 75 MG/1
75 TABLET ORAL DAILY
Qty: 30 TABLET | Refills: 1 | Status: SHIPPED | OUTPATIENT
Start: 2023-09-29

## 2023-09-28 RX ORDER — ASPIRIN 81 MG/1
81 TABLET, CHEWABLE ORAL EVERY EVENING
Qty: 30 TABLET | Refills: 1 | Status: SHIPPED | OUTPATIENT
Start: 2023-09-28

## 2023-09-28 RX ADMIN — SODIUM CHLORIDE, PRESERVATIVE FREE 10 ML: 5 INJECTION INTRAVENOUS at 08:06

## 2023-09-28 RX ADMIN — FAMOTIDINE 20 MG: 20 TABLET ORAL at 08:05

## 2023-09-28 RX ADMIN — CLOPIDOGREL BISULFATE 75 MG: 75 TABLET ORAL at 08:05

## 2023-09-28 RX ADMIN — HEPARIN SODIUM 5000 UNITS: 5000 INJECTION INTRAVENOUS; SUBCUTANEOUS at 08:05

## 2023-09-28 RX ADMIN — TRAZODONE HYDROCHLORIDE 50 MG: 50 TABLET ORAL at 00:00

## 2023-09-28 RX ADMIN — PROCHLORPERAZINE EDISYLATE 10 MG: 5 INJECTION INTRAMUSCULAR; INTRAVENOUS at 07:06

## 2023-09-28 RX ADMIN — LOSARTAN POTASSIUM 50 MG: 50 TABLET, FILM COATED ORAL at 13:08

## 2023-09-28 RX ADMIN — OXYCODONE HYDROCHLORIDE 5 MG: 5 TABLET ORAL at 10:42

## 2023-09-28 ASSESSMENT — PAIN SCALES - GENERAL
PAINLEVEL_OUTOF10: 0
PAINLEVEL_OUTOF10: 6
PAINLEVEL_OUTOF10: 0
PAINLEVEL_OUTOF10: 5

## 2023-09-28 ASSESSMENT — PAIN DESCRIPTION - DESCRIPTORS
DESCRIPTORS: ACHING
DESCRIPTORS: ACHING

## 2023-09-28 ASSESSMENT — PAIN DESCRIPTION - ORIENTATION
ORIENTATION: ANTERIOR
ORIENTATION: ANTERIOR

## 2023-09-28 ASSESSMENT — PAIN DESCRIPTION - LOCATION
LOCATION: HEAD
LOCATION: HEAD

## 2023-09-28 NOTE — DISCHARGE INSTRUCTIONS
Discharge Instructions       PATIENT ID: Daisy Warner. MRN: [id number]   YOB: 1965    DATE OF ADMISSION: 9/26/2023   DATE OF DISCHARGE: 9/28/2023    PRIMARY CARE PROVIDER: Jaswinder Pérez     ATTENDING PHYSICIAN: Andrew Rudd MD   DISCHARGING PROVIDER: ERIK Bose NP    To contact this individual call [phone] and ask the  to page. If unavailable ask to be transferred the Adult Hospitalist Department. DISCHARGE DIAGNOSES stroke    CONSULTATIONS: Neurology, Neurointerventional Surgery    PROCEDURES/SURGERIES: * No surgery found *    PENDING TEST RESULTS:   At the time of discharge the following test results are still pending: final carotid duplex US results    FOLLOW UP APPOINTMENTS:    Follow-up Information       Follow up With Specialties Details Why Contact Chidi Rivera MD Neurology Follow up in 4 week(s) hospital follow up [address] [facility]  [address] [place] [address] Dr Vamshi Reese,  Neurology, Psychiatry Schedule an appointment as soon as possible for a visit in 4 week(s) hospital follow up [address] [facility] [address]      Jaswinder Pérez MD Family Medicine Follow up Please call to schedule a follow-up appointment in 1 week. [address] [facility]      Baldemar Baumgarten, MD Cardiology, Interventional Cardiology Follow up Office should call you to schedule a follow-up appointment. Please call them if you do not hear from them. [address]              ADDITIONAL CARE RECOMMENDATIONS:   You are being discharged with a prescription for plavix (blood thinner). You should take this daily as prescribed. You are being discharged with a prescription for oxycodone (pain medication). You should take this as prescribed. Do not drink alcohol or drive while taking this medication.  You should decrease how

## 2023-09-28 NOTE — H&P
ICU Downgrade  History and Physical    Date of Service:  9/28/2023  Primary Care Provider: Zehra Cantu MD  Source of information: patient    Chief Complaint: No chief complaint on file. History of Presenting Illness:   Juliana Hernández is a 59 y.o. male with past medical history of CAD, MI, asthma, PTSD, COPD, tobacco use disorder, hypertension, hyperlipidemia, and GERD who presented to [facility] on 9/26 with acute onset right arm weakness, numbness, and right facial droop. Initial head CT was negative. Head/neck CTA showed focal filling defect or noncalcified atheroma in the distal basilar artery at the origin of the right posterior cerebral artery, mild left internal carotid artery stenosis, and focus of hyperenhancement in the right basal ganglia - AVM not excluded. He received TNK and was transferred to [facility] ICU. Brain MRI showed acute right PCA territory infarct and chronic infarct in the right posteroparietal region. On 9/28, hospitalist were consulted for ICU downgrade. REVIEW OF SYSTEMS:  A comprehensive review of systems was negative except for: headache      Past Medical History:   Diagnosis Date    Acute MI, anterior wall ([address]) 01/09/2016    VF arrest/lytics/[facility]flight [facility]/cath/no PCI    Asthma     Cervical radiculopathy     Chronic obstructive pulmonary disease (HCC)     Chronic pain     Coronary heart disease     Diverticulosis     GERD (gastroesophageal reflux disease)     Hypertension     Pneumonia     PTSD (post-traumatic stress disorder)     Shingles     Tubular adenoma       Past Surgical History:   Procedure Laterality Date    KNEE ARTHROSCOPY Right     ORTHOPEDIC SURGERY      removed a mass from right palm oct 13 2017right wrist fxr repair 2016 dec    ROTATOR CUFF REPAIR       Prior to Admission medications    Medication Sig Start Date End Date Taking?  Authorizing Provider   ibuprofen (ADVIL;MOTRIN) 600 MG tablet Take 1 tablet by mouth 4 times daily as needed
decision-making and personally managed or directed the management of the following life and organ supporting interventions that required my frequent assessment to treat or prevent imminent deterioration. I personally spent 50 minutes of critical care time. This is time spent at this critically ill patient's bedside actively involved in patient care as well as the coordination of care. This does not include any procedural time which has been billed separately.     Dimitri Dubin, APRN - CNP   Critical Care Medicine  [facility]

## 2023-09-28 NOTE — CONSULTS
NeuroInterventional Surgery Consult  ERIK Hernandez NP    Patient: Helena Brar. MRN: [id number]  SSN: [id number]    YOB: 1965  Age: 59 y.o. Sex: male      Chief Complaint:  headache right facial droop, right arm/leg weakness and numbness    HPI:   59 y.o. -H Black / [place] American  male consulted for left ICA stenosis and focal enhancement of right basal ganglia, AVM not excluded. Mr. Lily Baron presented to the ED at [facility] on 2023 with sudden onset right arm numbness, weakness, morning right facial droop. Upon admission a code stroke was called and he was given TNK and transferred to [facility] to eval for DVA. Past Medical History:   Diagnosis Date    Acute MI, anterior wall ([address]) 2016    VF arrest/lytics/[facility]/cath/no PCI    Asthma     Cervical radiculopathy     Chronic obstructive pulmonary disease (HCC)     Chronic pain     Coronary heart disease     Diverticulosis     GERD (gastroesophageal reflux disease)     Hypertension     Pneumonia     PTSD (post-traumatic stress disorder)     Shingles     Tubular adenoma      History reviewed. No pertinent family history. Social History     Tobacco Use    Smoking status: Every Day     Packs/day: .25     Types: Cigarettes    Smokeless tobacco: Never    Tobacco comments:     Quit smokin-5 ciggs per day. Substance Use Topics    Alcohol use:  Yes     Alcohol/week: 1.0 standard drink of alcohol      Current Facility-Administered Medications   Medication Dose Route Frequency Provider Last Rate Last Admin    nitroGLYCERIN (NITROSTAT) SL tablet 0.4 mg  0.4 mg SubLINGual Q5 Min PRN Veronia Ip, APRN - CNP        traMADol (ULTRAM) tablet 50 mg  50 mg Oral Q6H PRN Veronia Ip, APRN - CNP   50 mg at 23 0520    perflutren lipid microspheres (DEFINITY) injection 1.5 mL  1.5 mL IntraVENous ONCE PRN Omayra Hallman MD        heparin (porcine) injection 5,000 Units  5,000 Units SubCUTAneous BID Hitesh Blair
4-Facial Palsy:1 ( left facial droop)     5a-Left Arm:0    5b-Right Arm:0    6a-Left Le    6b-Right Le    7-Limb Ataxia: 2 ( BUE + left leg)     8-Sensory:0    9-Best Language:0    10-Dysarthria:0    11-Extinction/Inattention:0  TOTAL SCORE: 4     NIHSS at   1 for left facial droop       Labs:  Lab Results   Component Value Date/Time    WBC 6.9 2023 02:05 PM    HGB 14.1 2023 02:05 PM    HCT 42.1 2023 02:05 PM     2023 02:05 PM    MCV 94.0 2023 02:05 PM      Lab Results   Component Value Date/Time     2023 02:05 PM    K 4.1 2023 02:05 PM     2023 02:05 PM    CO2 30 2023 02:05 PM    BUN 10 2023 02:05 PM    GFRAA >60 2021 02:24 PM     Lab Results   Component Value Date/Time    CKMB 1.5 10/18/2019 03:49 PM    BNP 60 2023 01:50 PM       Pertinent Imagin/26/23 CT Head  IMPRESSION:  No acute process. 23 CT Angio Head and Neck   IMPRESSION:  1. Focal filling defect or noncalcified atheroma in the distal basilar artery  at the origin of the right posterior cerebral artery, possibly secondary to  intracranial atherosclerotic disease. 2.  Mild left internal carotid artery stenosis. 3.  Focus of hyperenhancement in the right basal ganglia fed by branches of the right lenticulostriate, AVM not excluded. Assessment and Plan   #Right sided weakness, right facial droop s/p TNK  #Left Carotid Stenosis   Patient is a 59year old male w/pmh of CAD, GERD, MI, COPD, hypertension, hyperlipidemia who presents as a tx from OSH with right sided weakness, right facial droop for which he received TNK at OSH. NIHSS 1 upon arrival to [facility]. Stroke work up initiated. Stroke etiology unclear at this time, atherosclerosis is of consideration given left carotid stenosis. NIHSS noted to be increased upon examining patient while rounding at 2030 on (documented above).  CT Head was obtained that was negative for acute
intracranial abnormality. MRI Result (most recent):  MRI BRAIN WO CONTRAST 09/27/2023    Narrative  EXAM: MRI BRAIN WO CONTRAST    INDICATION: assess cva, right sided weakness post TNK    COMPARISON: None. CONTRAST: None. TECHNIQUE:  Multiplanar multisequence acquisition without contrast of the brain. FINDINGS:  Acute versus subacute infarct involving the inferior/medial right occipital lobe  and subjacent inferomedial right temporal lobe. Small area of encephalomalacia along the right occipitoparietal lobe. Additional  patchy periventricular and subcortical white matter T2/FLAIR hyperintensity,  nonspecific and likely microangiopathic ischemic changes. There is no acute  infarct, hemorrhage, extra-axial fluid collection, or mass effect. There is no  cerebellar tonsillar herniation. Lack of expected arterial flow void in the  visualized portion of proximal right PCA. Patchy near complete opacification of the right greater than left anterior  ethmoid air cells and mild diffuse mucosal thickening of the paranasal sinuses. The mastoids are free of fluid bilaterally. The orbital contents are within  normal limits. No significant osseous or scalp lesions are identified. Impression  Acute right PCA territory infarct. Associated loss of expected arterial flow-voids in the proximal right PCA may  suggest vascular occlusion versus slow flow. Chronic infarct in the right posteroparietal region. Moderate chronic microangiopathic white matter disease. No acute intracranial hemorrhage. The critical findings were called to the ICU by Dr. Roni Thomas at 12:48 PM  on 9/27/2023 with a request for call back from the referring clinician's. Xray Result (most recent):  XR SHOULDER LEFT (MIN 2 VIEWS) 07/11/2023    Narrative  EXAM: XR SHOULDER LEFT (MIN 2 VIEWS)    INDICATION: fall. COMPARISON: 10/17/2022.     FINDINGS: Three views of the left shoulder demonstrate no fracture, dislocation  or

## 2023-09-28 NOTE — DISCHARGE SUMMARY
Discharge Summary     PATIENT ID: Bruna Ray MRN: [id number]   YOB: 1965    DATE OF ADMISSION: 9/26/2023  4:55 PM    DATE OF DISCHARGE: 9/28/2023   PRIMARY CARE PROVIDER: Binu Leo MD     ATTENDING PHYSICIAN: Dr. Aylin Oliveira  DISCHARGING PROVIDER: ERIK Hilton NP    To contact this individual call [phone] and ask the  to page. If unavailable ask to be transferred the Adult Hospitalist Department. CONSULTATIONS: IP CONSULT TO NEUROLOGY  IP CONSULT TO CARDIOLOGY    PROCEDURES/SURGERIES: * No surgery found *     ADMITTING DIAGNOSES & HOSPITAL COURSE:   Bruna Ray is a 59 y.o. male with past medical history of CAD, MI, asthma, PTSD, COPD, tobacco use disorder, hypertension, hyperlipidemia, and GERD who presented to [facility] on 9/26 with acute onset right arm weakness, numbness, and right facial droop. Initial head CT was negative. Head/neck CTA showed focal filling defect or noncalcified atheroma in the distal basilar artery at the origin of the right posterior cerebral artery, mild left internal carotid artery stenosis, and focus of hyperenhancement in the right basal ganglia - AVM not excluded. He received TNK and was transferred to [facility] ICU. Brain MRI showed acute right PCA territory infarct and chronic infarct in the right posteroparietal region. On 9/28, hospitalist were consulted for ICU downgrade. Patient was seen by PT/OT with recommendations made to have outpatient PT at time of discharge. Patient's vitals were stable and he was deemed medically stable for discharge. Holter monitor was placed prior to discharge with plans for patient to follow-up with Cardiology.     DISCHARGE DIAGNOSES / PLAN:      Right PCA Stroke  Chronic right posterior parietal stroke  -received TNK on 9/26  -possible cardioembolic in nature - continue telemetry, will need cardiac event monitor at discharge -- placed by Cardiology on 9/28, will need outpatient

## 2023-09-28 NOTE — PLAN OF CARE
Problem: Discharge Planning  Goal: Discharge to home or other facility with appropriate resources  Outcome: Progressing  Flowsheets (Taken 9/27/2023 2000)  Discharge to home or other facility with appropriate resources:   Identify barriers to discharge with patient and caregiver   Arrange for needed discharge resources and transportation as appropriate   Identify discharge learning needs (meds, wound care, etc)     Problem: Pain  Goal: Verbalizes/displays adequate comfort level or baseline comfort level  Outcome: Progressing  Flowsheets (Taken 9/27/2023 2000)  Verbalizes/displays adequate comfort level or baseline comfort level:   Encourage patient to monitor pain and request assistance   Assess pain using appropriate pain scale   Implement non-pharmacological measures as appropriate and evaluate response     Problem: Safety - Adult  Goal: Free from fall injury  Outcome: Progressing  Flowsheets (Taken 9/27/2023 2000)  Free From Fall Injury:   Instruct family/caregiver on patient safety   Based on caregiver fall risk screen, instruct family/caregiver to ask for assistance with transferring infant if caregiver noted to have fall risk factors     Problem: Chronic Conditions and Co-morbidities  Goal: Patient's chronic conditions and co-morbidity symptoms are monitored and maintained or improved  Outcome: Progressing  Flowsheets (Taken 9/27/2023 2000)  Care Plan - Patient's Chronic Conditions and Co-Morbidity Symptoms are Monitored and Maintained or Improved:   Monitor and assess patient's chronic conditions and comorbid symptoms for stability, deterioration, or improvement   Update acute care plan with appropriate goals if chronic or comorbid symptoms are exacerbated and prevent overall improvement and discharge   Collaborate with multidisciplinary team to address chronic and comorbid conditions and prevent exacerbation or deterioration

## 2023-09-29 LAB
EKG ATRIAL RATE: 63 BPM
EKG DIAGNOSIS: NORMAL
EKG P AXIS: 31 DEGREES
EKG P-R INTERVAL: 140 MS
EKG Q-T INTERVAL: 430 MS
EKG QRS DURATION: 80 MS
EKG QTC CALCULATION (BAZETT): 440 MS
EKG R AXIS: -8 DEGREES
EKG T AXIS: 1 DEGREES
EKG VENTRICULAR RATE: 63 BPM
VAS LEFT CCA DIST EDV: 28.1 CM/S
VAS LEFT CCA DIST PSV: 56.4 CM/S
VAS LEFT CCA PROX EDV: 20.8 CM/S
VAS LEFT CCA PROX PSV: 55.1 CM/S
VAS LEFT ECA EDV: 25.9 CM/S
VAS LEFT ECA PSV: 280.8 CM/S
VAS LEFT ICA DIST EDV: 20.1 CM/S
VAS LEFT ICA DIST PSV: 49.4 CM/S
VAS LEFT ICA MID EDV: 29.9 CM/S
VAS LEFT ICA MID PSV: 63.5 CM/S
VAS LEFT ICA PROX EDV: 41.3 CM/S
VAS LEFT ICA PROX PSV: 112.1 CM/S
VAS LEFT ICA/CCA PSV: 2 NO UNITS
VAS LEFT VERTEBRAL EDV: 16.2 CM/S
VAS LEFT VERTEBRAL PSV: 41.5 CM/S
VAS RIGHT CCA DIST EDV: 10.4 CM/S
VAS RIGHT CCA DIST PSV: 35.7 CM/S
VAS RIGHT CCA PROX EDV: 16.8 CM/S
VAS RIGHT CCA PROX PSV: 54.6 CM/S
VAS RIGHT ECA EDV: 12 CM/S
VAS RIGHT ECA PSV: 73.5 CM/S
VAS RIGHT ICA DIST EDV: 30.3 CM/S
VAS RIGHT ICA DIST PSV: 67.1 CM/S
VAS RIGHT ICA MID EDV: 25.7 CM/S
VAS RIGHT ICA MID PSV: 62.5 CM/S
VAS RIGHT ICA PROX EDV: 21.7 CM/S
VAS RIGHT ICA PROX PSV: 45.8 CM/S
VAS RIGHT ICA/CCA PSV: 1.9 NO UNITS
VAS RIGHT VERTEBRAL EDV: 25.7 CM/S
VAS RIGHT VERTEBRAL PSV: 57.6 CM/S

## 2023-11-08 ENCOUNTER — TRANSCRIBE ORDERS (OUTPATIENT)
Facility: HOSPITAL | Age: 58
End: 2023-11-08

## 2023-11-08 DIAGNOSIS — G44.89 OTHER HEADACHE SYNDROME: ICD-10-CM

## 2023-11-08 DIAGNOSIS — Z86.73 HISTORY OF ISCHEMIC RIGHT PCA STROKE: Primary | ICD-10-CM

## 2023-11-16 ENCOUNTER — HOSPITAL ENCOUNTER (OUTPATIENT)
Facility: HOSPITAL | Age: 58
Discharge: HOME | End: 2023-11-16
Payer: MEDICAID

## 2023-11-16 DIAGNOSIS — G44.89 OTHER HEADACHE SYNDROME: ICD-10-CM

## 2023-11-16 DIAGNOSIS — Z86.73 HISTORY OF ISCHEMIC RIGHT PCA STROKE: ICD-10-CM

## 2023-11-16 PROCEDURE — 70551 MRI BRAIN STEM W/O DYE: CPT

## 2023-12-06 ENCOUNTER — APPOINTMENT (OUTPATIENT)
Facility: HOSPITAL | Age: 58
End: 2023-12-06
Payer: MEDICAID

## 2023-12-06 ENCOUNTER — HOSPITAL ENCOUNTER (OUTPATIENT)
Facility: HOSPITAL | Age: 58
Setting detail: OBSERVATION
Discharge: HOME | End: 2023-12-07
Attending: EMERGENCY MEDICINE | Admitting: INTERNAL MEDICINE
Payer: MEDICAID

## 2023-12-06 DIAGNOSIS — H53.8 BLURRED VISION: ICD-10-CM

## 2023-12-06 DIAGNOSIS — R41.0 ACUTE CONFUSION: ICD-10-CM

## 2023-12-06 DIAGNOSIS — I63.9 ACUTE STROKE DUE TO ISCHEMIA (HCC): Primary | ICD-10-CM

## 2023-12-06 PROBLEM — R29.90 STROKE-LIKE SYMPTOMS: Status: ACTIVE | Noted: 2023-12-06

## 2023-12-06 LAB
ALBUMIN SERPL-MCNC: 4.2 G/DL (ref 3.5–5)
ALBUMIN/GLOB SERPL: 1.2 (ref 1.1–2.2)
ALP SERPL-CCNC: 69 U/L (ref 45–117)
ALT SERPL-CCNC: 27 U/L (ref 12–78)
ANION GAP SERPL CALC-SCNC: 7 MMOL/L (ref 5–15)
AST SERPL-CCNC: 16 U/L (ref 15–37)
BASOPHILS # BLD: 0 K/UL (ref 0–0.1)
BASOPHILS NFR BLD: 0 % (ref 0–1)
BILIRUB SERPL-MCNC: 0.5 MG/DL (ref 0.2–1)
BUN SERPL-MCNC: 10 MG/DL (ref 6–20)
BUN/CREAT SERPL: 7 (ref 12–20)
CALCIUM SERPL-MCNC: 8.9 MG/DL (ref 8.5–10.1)
CHLORIDE SERPL-SCNC: 101 MMOL/L (ref 97–108)
CO2 SERPL-SCNC: 28 MMOL/L (ref 21–32)
CREAT SERPL-MCNC: 1.39 MG/DL (ref 0.7–1.3)
DIFFERENTIAL METHOD BLD: NORMAL
EOSINOPHIL # BLD: 0.3 K/UL (ref 0–0.4)
EOSINOPHIL NFR BLD: 4 % (ref 0–7)
ERYTHROCYTE [DISTWIDTH] IN BLOOD BY AUTOMATED COUNT: 11.9 % (ref 11.5–14.5)
GLOBULIN SER CALC-MCNC: 3.5 G/DL (ref 2–4)
GLUCOSE BLD STRIP.AUTO-MCNC: 111 MG/DL (ref 65–117)
GLUCOSE SERPL-MCNC: 120 MG/DL (ref 65–100)
HCT VFR BLD AUTO: 44.6 % (ref 36.6–50.3)
HGB BLD-MCNC: 15 G/DL (ref 12.1–17)
IMM GRANULOCYTES # BLD AUTO: 0 K/UL (ref 0–0.04)
IMM GRANULOCYTES NFR BLD AUTO: 0 % (ref 0–0.5)
INR PPP: 0.9 (ref 0.9–1.1)
LYMPHOCYTES # BLD: 2.7 K/UL (ref 0.8–3.5)
LYMPHOCYTES NFR BLD: 41 % (ref 12–49)
MCH RBC QN AUTO: 31.4 PG (ref 26–34)
MCHC RBC AUTO-ENTMCNC: 33.6 G/DL (ref 30–36.5)
MCV RBC AUTO: 93.5 FL (ref 80–99)
MONOCYTES # BLD: 0.6 K/UL (ref 0–1)
MONOCYTES NFR BLD: 9 % (ref 5–13)
NEUTS SEG # BLD: 3.1 K/UL (ref 1.8–8)
NEUTS SEG NFR BLD: 46 % (ref 32–75)
NRBC # BLD: 0 K/UL (ref 0–0.01)
NRBC BLD-RTO: 0 PER 100 WBC
PLATELET # BLD AUTO: 268 K/UL (ref 150–400)
PMV BLD AUTO: 9.7 FL (ref 8.9–12.9)
POTASSIUM SERPL-SCNC: 3.7 MMOL/L (ref 3.5–5.1)
PROT SERPL-MCNC: 7.7 G/DL (ref 6.4–8.2)
PROTHROMBIN TIME: 9.3 SEC (ref 9–11.1)
RBC # BLD AUTO: 4.77 M/UL (ref 4.1–5.7)
SERVICE CMNT-IMP: NORMAL
SODIUM SERPL-SCNC: 136 MMOL/L (ref 136–145)
TROPONIN I SERPL HS-MCNC: 8 NG/L (ref 0–76)
TSH SERPL DL<=0.05 MIU/L-ACNC: 1.29 UIU/ML (ref 0.36–3.74)
WBC # BLD AUTO: 6.7 K/UL (ref 4.1–11.1)

## 2023-12-06 PROCEDURE — 36415 COLL VENOUS BLD VENIPUNCTURE: CPT

## 2023-12-06 PROCEDURE — 93005 ELECTROCARDIOGRAM TRACING: CPT | Performed by: EMERGENCY MEDICINE

## 2023-12-06 PROCEDURE — 85610 PROTHROMBIN TIME: CPT

## 2023-12-06 PROCEDURE — 70551 MRI BRAIN STEM W/O DYE: CPT

## 2023-12-06 PROCEDURE — 70498 CT ANGIOGRAPHY NECK: CPT

## 2023-12-06 PROCEDURE — 6370000000 HC RX 637 (ALT 250 FOR IP): Performed by: EMERGENCY MEDICINE

## 2023-12-06 PROCEDURE — 70544 MR ANGIOGRAPHY HEAD W/O DYE: CPT

## 2023-12-06 PROCEDURE — G0378 HOSPITAL OBSERVATION PER HR: HCPCS

## 2023-12-06 PROCEDURE — 82306 VITAMIN D 25 HYDROXY: CPT

## 2023-12-06 PROCEDURE — 80053 COMPREHEN METABOLIC PANEL: CPT

## 2023-12-06 PROCEDURE — 82607 VITAMIN B-12: CPT

## 2023-12-06 PROCEDURE — 84443 ASSAY THYROID STIM HORMONE: CPT

## 2023-12-06 PROCEDURE — 6360000004 HC RX CONTRAST MEDICATION: Performed by: EMERGENCY MEDICINE

## 2023-12-06 PROCEDURE — 85025 COMPLETE CBC W/AUTO DIFF WBC: CPT

## 2023-12-06 PROCEDURE — 82962 GLUCOSE BLOOD TEST: CPT

## 2023-12-06 PROCEDURE — 70450 CT HEAD/BRAIN W/O DYE: CPT

## 2023-12-06 PROCEDURE — 99285 EMERGENCY DEPT VISIT HI MDM: CPT

## 2023-12-06 PROCEDURE — 84484 ASSAY OF TROPONIN QUANT: CPT

## 2023-12-06 PROCEDURE — 6370000000 HC RX 637 (ALT 250 FOR IP): Performed by: INTERNAL MEDICINE

## 2023-12-06 PROCEDURE — 6370000000 HC RX 637 (ALT 250 FOR IP): Performed by: STUDENT IN AN ORGANIZED HEALTH CARE EDUCATION/TRAINING PROGRAM

## 2023-12-06 RX ORDER — ONDANSETRON 4 MG/1
4 TABLET, ORALLY DISINTEGRATING ORAL EVERY 8 HOURS PRN
Status: DISCONTINUED | OUTPATIENT
Start: 2023-12-06 | End: 2023-12-07 | Stop reason: HOSPADM

## 2023-12-06 RX ORDER — LOSARTAN POTASSIUM 50 MG/1
50 TABLET ORAL DAILY
Status: DISCONTINUED | OUTPATIENT
Start: 2023-12-06 | End: 2023-12-07 | Stop reason: HOSPADM

## 2023-12-06 RX ORDER — OXYCODONE HYDROCHLORIDE 5 MG/1
5 TABLET ORAL
Status: COMPLETED | OUTPATIENT
Start: 2023-12-06 | End: 2023-12-06

## 2023-12-06 RX ORDER — ALBUTEROL SULFATE 2.5 MG/3ML
2.5 SOLUTION RESPIRATORY (INHALATION) EVERY 4 HOURS PRN
Status: DISCONTINUED | OUTPATIENT
Start: 2023-12-06 | End: 2023-12-07 | Stop reason: HOSPADM

## 2023-12-06 RX ORDER — CLOPIDOGREL BISULFATE 75 MG/1
75 TABLET ORAL DAILY
Status: DISCONTINUED | OUTPATIENT
Start: 2023-12-07 | End: 2023-12-07 | Stop reason: HOSPADM

## 2023-12-06 RX ORDER — OXYCODONE HYDROCHLORIDE 5 MG/1
5 TABLET ORAL EVERY 4 HOURS PRN
Status: DISCONTINUED | OUTPATIENT
Start: 2023-12-06 | End: 2023-12-07 | Stop reason: HOSPADM

## 2023-12-06 RX ORDER — ASPIRIN 81 MG/1
81 TABLET, CHEWABLE ORAL DAILY
Status: DISCONTINUED | OUTPATIENT
Start: 2023-12-07 | End: 2023-12-07 | Stop reason: HOSPADM

## 2023-12-06 RX ORDER — ENOXAPARIN SODIUM 100 MG/ML
40 INJECTION SUBCUTANEOUS DAILY
Status: DISCONTINUED | OUTPATIENT
Start: 2023-12-06 | End: 2023-12-06

## 2023-12-06 RX ORDER — HEPARIN SODIUM 5000 [USP'U]/ML
5000 INJECTION, SOLUTION INTRAVENOUS; SUBCUTANEOUS 2 TIMES DAILY
Status: DISCONTINUED | OUTPATIENT
Start: 2023-12-06 | End: 2023-12-07 | Stop reason: HOSPADM

## 2023-12-06 RX ORDER — SODIUM CHLORIDE 9 MG/ML
INJECTION, SOLUTION INTRAVENOUS PRN
Status: DISCONTINUED | OUTPATIENT
Start: 2023-12-06 | End: 2023-12-07 | Stop reason: HOSPADM

## 2023-12-06 RX ORDER — SODIUM CHLORIDE 0.9 % (FLUSH) 0.9 %
5-40 SYRINGE (ML) INJECTION PRN
Status: DISCONTINUED | OUTPATIENT
Start: 2023-12-06 | End: 2023-12-07 | Stop reason: HOSPADM

## 2023-12-06 RX ORDER — ACETAMINOPHEN 500 MG
1000 TABLET ORAL
Status: COMPLETED | OUTPATIENT
Start: 2023-12-06 | End: 2023-12-06

## 2023-12-06 RX ORDER — POLYETHYLENE GLYCOL 3350 17 G/17G
17 POWDER, FOR SOLUTION ORAL DAILY PRN
Status: DISCONTINUED | OUTPATIENT
Start: 2023-12-06 | End: 2023-12-07 | Stop reason: HOSPADM

## 2023-12-06 RX ORDER — ATORVASTATIN CALCIUM 40 MG/1
80 TABLET, FILM COATED ORAL NIGHTLY
Status: DISCONTINUED | OUTPATIENT
Start: 2023-12-06 | End: 2023-12-07 | Stop reason: HOSPADM

## 2023-12-06 RX ORDER — SODIUM CHLORIDE 0.9 % (FLUSH) 0.9 %
5-40 SYRINGE (ML) INJECTION EVERY 12 HOURS SCHEDULED
Status: DISCONTINUED | OUTPATIENT
Start: 2023-12-06 | End: 2023-12-07 | Stop reason: HOSPADM

## 2023-12-06 RX ORDER — FAMOTIDINE 20 MG/1
20 TABLET, FILM COATED ORAL 2 TIMES DAILY
Status: DISCONTINUED | OUTPATIENT
Start: 2023-12-06 | End: 2023-12-07 | Stop reason: HOSPADM

## 2023-12-06 RX ORDER — BUTALBITAL, ACETAMINOPHEN AND CAFFEINE 50; 325; 40 MG/1; MG/1; MG/1
1 TABLET ORAL EVERY 4 HOURS PRN
Status: DISCONTINUED | OUTPATIENT
Start: 2023-12-06 | End: 2023-12-07 | Stop reason: HOSPADM

## 2023-12-06 RX ORDER — ONDANSETRON 2 MG/ML
4 INJECTION INTRAMUSCULAR; INTRAVENOUS EVERY 6 HOURS PRN
Status: DISCONTINUED | OUTPATIENT
Start: 2023-12-06 | End: 2023-12-07 | Stop reason: HOSPADM

## 2023-12-06 RX ORDER — TRAZODONE HYDROCHLORIDE 100 MG/1
300 TABLET ORAL NIGHTLY
Status: DISCONTINUED | OUTPATIENT
Start: 2023-12-06 | End: 2023-12-07 | Stop reason: HOSPADM

## 2023-12-06 RX ORDER — ASPIRIN 325 MG
325 TABLET ORAL
Status: COMPLETED | OUTPATIENT
Start: 2023-12-06 | End: 2023-12-06

## 2023-12-06 RX ADMIN — FAMOTIDINE 20 MG: 20 TABLET ORAL at 20:20

## 2023-12-06 RX ADMIN — BUTALBITAL, ACETAMINOPHEN AND CAFFEINE 1 TABLET: 325; 50; 40 TABLET ORAL at 18:17

## 2023-12-06 RX ADMIN — ASPIRIN 325 MG: 325 TABLET ORAL at 14:41

## 2023-12-06 RX ADMIN — OXYCODONE HYDROCHLORIDE 5 MG: 5 TABLET ORAL at 15:17

## 2023-12-06 RX ADMIN — IOPAMIDOL 100 ML: 755 INJECTION, SOLUTION INTRAVENOUS at 13:17

## 2023-12-06 RX ADMIN — ACETAMINOPHEN 1000 MG: 500 TABLET ORAL at 14:42

## 2023-12-06 RX ADMIN — ATORVASTATIN CALCIUM 80 MG: 40 TABLET, FILM COATED ORAL at 20:21

## 2023-12-06 RX ADMIN — TRAZODONE HYDROCHLORIDE 300 MG: 100 TABLET ORAL at 21:37

## 2023-12-06 RX ADMIN — OXYCODONE HYDROCHLORIDE 5 MG: 5 TABLET ORAL at 20:21

## 2023-12-06 ASSESSMENT — PAIN SCALES - GENERAL
PAINLEVEL_OUTOF10: 0
PAINLEVEL_OUTOF10: 8
PAINLEVEL_OUTOF10: 0
PAINLEVEL_OUTOF10: 8

## 2023-12-06 ASSESSMENT — PAIN DESCRIPTION - DESCRIPTORS: DESCRIPTORS: ACHING

## 2023-12-06 ASSESSMENT — PAIN - FUNCTIONAL ASSESSMENT: PAIN_FUNCTIONAL_ASSESSMENT: 0-10

## 2023-12-06 ASSESSMENT — PAIN DESCRIPTION - LOCATION
LOCATION: HEAD
LOCATION: HEAD

## 2023-12-06 NOTE — ED NOTES
Dr. Madhuri Waldrop, Hospitalist at bedside to discuss plan of care with pt at this time.      Keyla Mas RN  83/69/20 1500
Dr. Sharyn Mendosa teleneurologist at bedside with remote console to assess pt at this time.  This RN to assist.       Connie Ricardo RN  35/27/36 2729
Pt denies tingling in his arm at this time. Confusion remains but is better.       Jamie Ojeda RN  36/62/74 6029
Pt has returned from radiology at this time.       Dearl Morgan, KARL  53/10/29 3538
Pt mother at bedside, ED Provider at bedside, discussing admission, Hospitalist Jacques to come down to assess pt and further discuss plan of care at this time. Pt reports some difficulty managing pain from his headaches which he has had since his last stroke.      Tan Garcia RN  70/31/87 0286
Pt reports being currently on ASA and Plavix. Pt wife has consulted with  via phone and remote console for plan of care to include admission for further neurological work up.      Keyla Mas RN  05/25/61 3526
Pt reports he initially had a headache on arrival and it is a bit more intense now, 8/10. Corresponds to how he has felt with previous strokes. Pt also has some nausea, reports he has PRN Promethazine PO for this which helps. ED Provider informed.      Yosef Hughes RN  38/23/86 9525
Pt transported to CT scan with Rad Tech by Moses Purcell at this time.       Nasim Hernandez RN  31/00/91 1088
Pt transported to MRI scan with [employer] by [employer] at this time.       Robbin Lozoya RN  32/49/85 9611
Received assignment, assuming care.  This RN going to CT with pt at this time [address] [facility], [address] [place] [address], RN  27/36/14 4736
0.1 K/UL    Absolute Immature Granulocyte 0.0 0.00 - 0.04 K/UL    Differential Type AUTOMATED     Comprehensive Metabolic Panel    Collection Time: 12/06/23 12:19 PM   Result Value Ref Range    Sodium 136 136 - 145 mmol/L    Potassium 3.7 3.5 - 5.1 mmol/L    Chloride 101 97 - 108 mmol/L    CO2 28 21 - 32 mmol/L    Anion Gap 7 5 - 15 mmol/L    Glucose 120 (H) 65 - 100 mg/dL    BUN 10 6 - 20 MG/DL    Creatinine 1.39 (H) 0.70 - 1.30 MG/DL    Bun/Cre Ratio 7 (L) 12 - 20      Est, Glom Filt Rate 58 (L) >60 ml/min/1.73m2    Calcium 8.9 8.5 - 10.1 MG/DL    Total Bilirubin 0.5 0.2 - 1.0 MG/DL    ALT 27 12 - 78 U/L    AST 16 15 - 37 U/L    Alk Phosphatase 69 45 - 117 U/L    Total Protein 7.7 6.4 - 8.2 g/dL    Albumin 4.2 3.5 - 5.0 g/dL    Globulin 3.5 2.0 - 4.0 g/dL    Albumin/Globulin Ratio 1.2 1.1 - 2.2     Protime-INR    Collection Time: 12/06/23 12:19 PM   Result Value Ref Range    INR 0.9 0.9 - 1.1      Protime 9.3 9.0 - 11.1 sec   Troponin    Collection Time: 12/06/23 12:19 PM   Result Value Ref Range    Troponin, High Sensitivity 8 0 - 76 ng/L   EKG 12 Lead    Collection Time: 12/06/23  1:04 PM   Result Value Ref Range    Ventricular Rate 72 BPM    Atrial Rate 72 BPM    P-R Interval 152 ms    QRS Duration 86 ms    Q-T Interval 386 ms    QTc Calculation (Bazett) 422 ms    P Axis 55 degrees    R Axis -2 degrees    T Axis 6 degrees    Diagnosis       Sinus rhythm with premature atrial complexes  Otherwise normal ECG  When compared with ECG of 27-SEP-2023 05:18,  premature atrial complexes are now present           CT HEAD WO CONTRAST   Final Result   1. No evidence of acute infarct or intracranial hemorrhage. 2. Mild periventricular white matter disease is likely secondary to chronic   small vessel ischemic changes. 3. Chronic right PCA territory infarction.           CTA HEAD NECK W CONTRAST    (Results Pending)         Labs Reviewed   COMPREHENSIVE METABOLIC PANEL - Abnormal; Notable for the following components:

## 2023-12-06 NOTE — H&P
Hospitalist Admission Note    NAME:   Cheryl Fox. : 1965   MRN: [id number]     Date/Time: 2023 4:03 PM    Patient PCP: Bernice Sharma MD    ______________________________________________________________________  Given the patient's current clinical presentation, I have a high level of concern for decompensation if discharged from the emergency department. Complex decision making was performed, which includes reviewing the patient's available past medical records, laboratory results, and x-ray films. My assessment of this patient's clinical condition and my plan of care is as follows. Assessment / Plan:    Stroke-like symptoms  Recent right PCA territory infarct and chronic infarct in the right posteroparietal region  -patient admits to compliance with his outpatient Aspirin/Plavix/Statin however has had persistent headache with new symptoms similar to previous stroke. CT head WO contrast was negative for acute findings. CTA noted occlusion of the right posterior cerebral artery in the distal P2 segment which is likely chronic. Tele-Neurology was consulted by the ED provider. Will continue outpatient regimen, monitor with frequent neuro checks and obtain an MRI scan of the brain to evaluate for an acute on chronic infarction.    -Additionally CTA was suspicious for a small AVM in the right basal ganglia. Will obtain an MRA of the head for further evaluation. 3.   Abnormal CTA Neck W contrast  4. Unintentional weight loss of 10 pounds in two months  5. Intermittent dysphagia to solid foods  6. History of tobacco and alcohol use    -CTA of the neck noted medialization of the right vocal cord with nodularity (NOT seen on previous CTA in 2023) which may represent right vocal cord paralysis with associated neoplasm. Discussed findings with both patient and his significant other however he declines transfer at this time.   Will attempt to arrange urgent outpatient

## 2023-12-06 NOTE — ED PROVIDER NOTES
1. Acute stroke due to ischemia ([address])    2. Acute confusion    3. Blurred vision          DISPOSITION/PLAN   Palak Willams Jr.'s  results have been reviewed with him. He has been counseled regarding his diagnosis, treatment, and plan. He verbally conveys understanding and agreement of the signs, symptoms, diagnosis, treatment and prognosis    CLINICAL IMPRESSION    Admit Note: Pt is being admitted by Dr Jorge Luis Moore. The results of their tests and reason(s) for their admission have been discussed with pt and/or available family. They convey agreement and understanding for the need to be admitted and for the admission diagnosis. DISCONTINUED MEDICATIONS:  Discharge Medication List as of 12/7/2023  5:02 PM        STOP taking these medications       naproxen (NAPROSYN) 500 MG tablet Comments:   Reason for Stopping:               I am the Primary Clinician of Record. Misty Badillo MD (electronically signed)    (Please note that parts of this dictation were completed with voice recognition software. Quite often unanticipated grammatical, syntax, homophones, and other interpretive errors are inadvertently transcribed by the computer software. Please disregards these errors.  Please excuse any errors that have escaped final proofreading.)         Dat Wills MD  12/08/23 8193

## 2023-12-06 NOTE — ED TRIAGE NOTES
Pt arrived by POV for stoke symptoms. Pt reports 1/2 before arrival he noticed right arm tingling with change in sensation to her right forearm with blurred vision in his right eye and confusion. Pt reports he took his b/p at home and it was elevated. Pt reports he had two stroke previously and this feels the same. Pt ambulated with a steady gait. Pt is awake alert and oriented X 4 at this time.   Pt educated on ER flow

## 2023-12-07 VITALS
WEIGHT: 200.6 LBS | HEART RATE: 83 BPM | BODY MASS INDEX: 25.74 KG/M2 | TEMPERATURE: 97.9 F | OXYGEN SATURATION: 100 % | SYSTOLIC BLOOD PRESSURE: 131 MMHG | HEIGHT: 74 IN | RESPIRATION RATE: 18 BRPM | DIASTOLIC BLOOD PRESSURE: 83 MMHG

## 2023-12-07 LAB
25(OH)D3 SERPL-MCNC: <9 NG/ML (ref 30–100)
ANION GAP SERPL CALC-SCNC: 7 MMOL/L (ref 5–15)
BUN SERPL-MCNC: 11 MG/DL (ref 6–20)
BUN/CREAT SERPL: 8 (ref 12–20)
CALCIUM SERPL-MCNC: 8.4 MG/DL (ref 8.5–10.1)
CHLORIDE SERPL-SCNC: 102 MMOL/L (ref 97–108)
CHOLEST SERPL-MCNC: 194 MG/DL
CO2 SERPL-SCNC: 28 MMOL/L (ref 21–32)
CREAT SERPL-MCNC: 1.39 MG/DL (ref 0.7–1.3)
EKG ATRIAL RATE: 72 BPM
EKG DIAGNOSIS: NORMAL
EKG P AXIS: 55 DEGREES
EKG P-R INTERVAL: 152 MS
EKG Q-T INTERVAL: 386 MS
EKG QRS DURATION: 86 MS
EKG QTC CALCULATION (BAZETT): 422 MS
EKG R AXIS: -2 DEGREES
EKG T AXIS: 6 DEGREES
EKG VENTRICULAR RATE: 72 BPM
ERYTHROCYTE [DISTWIDTH] IN BLOOD BY AUTOMATED COUNT: 11.8 % (ref 11.5–14.5)
EST. AVERAGE GLUCOSE BLD GHB EST-MCNC: 88 MG/DL
GLUCOSE SERPL-MCNC: 104 MG/DL (ref 65–100)
HBA1C MFR BLD: 4.7 % (ref 4–5.6)
HCT VFR BLD AUTO: 40.3 % (ref 36.6–50.3)
HDLC SERPL-MCNC: 48 MG/DL
HDLC SERPL: 4 (ref 0–5)
HGB BLD-MCNC: 13.4 G/DL (ref 12.1–17)
LDLC SERPL CALC-MCNC: 128.8 MG/DL (ref 0–100)
MCH RBC QN AUTO: 31.3 PG (ref 26–34)
MCHC RBC AUTO-ENTMCNC: 33.3 G/DL (ref 30–36.5)
MCV RBC AUTO: 94.2 FL (ref 80–99)
NRBC # BLD: 0 K/UL (ref 0–0.01)
NRBC BLD-RTO: 0 PER 100 WBC
PLATELET # BLD AUTO: 233 K/UL (ref 150–400)
PMV BLD AUTO: 9.9 FL (ref 8.9–12.9)
POTASSIUM SERPL-SCNC: 3.8 MMOL/L (ref 3.5–5.1)
RBC # BLD AUTO: 4.28 M/UL (ref 4.1–5.7)
SODIUM SERPL-SCNC: 137 MMOL/L (ref 136–145)
TRIGL SERPL-MCNC: 86 MG/DL
VLDLC SERPL CALC-MCNC: 17.2 MG/DL
WBC # BLD AUTO: 6.2 K/UL (ref 4.1–11.1)

## 2023-12-07 PROCEDURE — 80048 BASIC METABOLIC PNL TOTAL CA: CPT

## 2023-12-07 PROCEDURE — 97161 PT EVAL LOW COMPLEX 20 MIN: CPT

## 2023-12-07 PROCEDURE — 94760 N-INVAS EAR/PLS OXIMETRY 1: CPT

## 2023-12-07 PROCEDURE — G0378 HOSPITAL OBSERVATION PER HR: HCPCS

## 2023-12-07 PROCEDURE — 6370000000 HC RX 637 (ALT 250 FOR IP): Performed by: INTERNAL MEDICINE

## 2023-12-07 PROCEDURE — 97165 OT EVAL LOW COMPLEX 30 MIN: CPT | Performed by: OCCUPATIONAL THERAPIST

## 2023-12-07 PROCEDURE — 36415 COLL VENOUS BLD VENIPUNCTURE: CPT

## 2023-12-07 PROCEDURE — 80061 LIPID PANEL: CPT

## 2023-12-07 PROCEDURE — 2580000003 HC RX 258: Performed by: INTERNAL MEDICINE

## 2023-12-07 PROCEDURE — 85027 COMPLETE CBC AUTOMATED: CPT

## 2023-12-07 PROCEDURE — 6360000002 HC RX W HCPCS: Performed by: INTERNAL MEDICINE

## 2023-12-07 PROCEDURE — 97535 SELF CARE MNGMENT TRAINING: CPT | Performed by: OCCUPATIONAL THERAPIST

## 2023-12-07 PROCEDURE — 83036 HEMOGLOBIN GLYCOSYLATED A1C: CPT

## 2023-12-07 PROCEDURE — 97110 THERAPEUTIC EXERCISES: CPT

## 2023-12-07 RX ORDER — MORPHINE SULFATE 2 MG/ML
2 INJECTION, SOLUTION INTRAMUSCULAR; INTRAVENOUS EVERY 4 HOURS PRN
Status: DISCONTINUED | OUTPATIENT
Start: 2023-12-07 | End: 2023-12-07 | Stop reason: HOSPADM

## 2023-12-07 RX ORDER — OXYCODONE HYDROCHLORIDE 5 MG/1
5 TABLET ORAL EVERY 4 HOURS PRN
Qty: 10 TABLET | Refills: 0 | Status: SHIPPED | OUTPATIENT
Start: 2023-12-07 | End: 2023-12-10

## 2023-12-07 RX ORDER — ACETAMINOPHEN 325 MG/1
650 TABLET ORAL EVERY 6 HOURS PRN
Status: DISCONTINUED | OUTPATIENT
Start: 2023-12-07 | End: 2023-12-07 | Stop reason: HOSPADM

## 2023-12-07 RX ADMIN — LOSARTAN POTASSIUM 50 MG: 50 TABLET, FILM COATED ORAL at 08:57

## 2023-12-07 RX ADMIN — MORPHINE SULFATE 2 MG: 2 INJECTION, SOLUTION INTRAMUSCULAR; INTRAVENOUS at 08:57

## 2023-12-07 RX ADMIN — OXYCODONE HYDROCHLORIDE 5 MG: 5 TABLET ORAL at 03:08

## 2023-12-07 RX ADMIN — SODIUM CHLORIDE, PRESERVATIVE FREE 10 ML: 5 INJECTION INTRAVENOUS at 08:57

## 2023-12-07 RX ADMIN — ASPIRIN 81 MG: 81 TABLET, CHEWABLE ORAL at 08:57

## 2023-12-07 RX ADMIN — FAMOTIDINE 20 MG: 20 TABLET ORAL at 08:57

## 2023-12-07 RX ADMIN — MORPHINE SULFATE 2 MG: 2 INJECTION, SOLUTION INTRAMUSCULAR; INTRAVENOUS at 12:53

## 2023-12-07 RX ADMIN — CLOPIDOGREL BISULFATE 75 MG: 75 TABLET ORAL at 08:57

## 2023-12-07 RX ADMIN — ONDANSETRON 4 MG: 4 TABLET, ORALLY DISINTEGRATING ORAL at 03:49

## 2023-12-07 ASSESSMENT — PAIN DESCRIPTION - DESCRIPTORS
DESCRIPTORS: ACHING
DESCRIPTORS: THROBBING

## 2023-12-07 ASSESSMENT — PAIN DESCRIPTION - LOCATION
LOCATION: HEAD

## 2023-12-07 ASSESSMENT — PAIN SCALES - GENERAL
PAINLEVEL_OUTOF10: 8
PAINLEVEL_OUTOF10: 9
PAINLEVEL_OUTOF10: 8

## 2023-12-07 NOTE — PLAN OF CARE
Problem: Pain  Goal: Verbalizes/displays adequate comfort level or baseline comfort level  12/7/2023 0118 by Azam Vides RN  Outcome: Progressing  12/6/2023 1750 by Toi Schaefer RN  Outcome: Progressing     Problem: ABCDS Injury Assessment  Goal: Absence of physical injury  12/7/2023 0118 by Azam Vides RN  Outcome: Progressing  12/6/2023 1750 by Toi Schaefer RN  Outcome: Progressing     Problem: Chronic Conditions and Co-morbidities  Goal: Patient's chronic conditions and co-morbidity symptoms are monitored and maintained or improved  Outcome: Progressing
the outcome/ POC  LOW Complexity : 1-2 Standardized tests and measures addressing body structure, function, activity limitation and / or participation in recreation  LOW Complexity : Stable, uncomplicated  Timed Up and Go  LOW      Based on the above components, the patient evaluation is determined to be of the following complexity level: Low

## 2023-12-07 NOTE — PROGRESS NOTES
Orders to discharge home. Provided education on stroke prevention including diet modifications and medication adherence. Opportunity for questions and concerns given. No questions at this time. Neurology follow up appointment made by Dr. Oliver Magaña MD. Chip Basurto removed. Telemetry removed.  Assisted off floor via wheelchair with no complications.   -Discharged

## 2023-12-07 NOTE — CARE COORDINATION
Care Management Initial Assessment       RUR: N/A Observation  Readmission? No  1st IM letter given? No N/A  1st  letter given: No N/A       12/07/23 1103   Service Assessment   Patient Orientation Alert and Oriented;Person;Place;Situation;Self   Cognition Alert   History Provided By Patient   Primary Carey Cabello Parent;Friends/Neighbors   PCP Verified by CM Yes  Sharda Henley  Caitlin Mendoza)   Last Visit to PCP Within last 3 months   Prior Functional Level Independent in ADLs/IADLs   Current Functional Level Independent in ADLs/IADLs   Can patient return to prior living arrangement Yes   Family able to assist with home care needs: Yes   Would you like for me to discuss the discharge plan with any other family members/significant others, and if so, who? Yes  (Mother Cornelius Hussein, friend Jake Valencia)   Financial Resources Medicaid   Community Resources None   Social/Functional History   Lives With Parent   Type of [employer]   Active  Yes   Discharge Planning   Type of Residence House   Living Arrangements Parent   Current Services Prior To Admission None   Patient expects to be discharged to: Katheryn (ACP) Conversation    Date of Conversation: 12/07/23    Conducted with: Patient with Decision Making Capacity    Healthcare Decision Maker:  Patient plans to discuss with son. Content/Action Overview:  Has NO ACP documents/care preferences - information provided, considering goals and options  Reviewed DNR/DNI and patient elects Full Code (Attempt Resuscitation)      Length of Voluntary ACP Conversation in minutes:  <16 minutes (Non-Billable)    MANUEL Izaguirre        Mr. Moses Smith was admitted 12/6/23 under Observation status with Stroke. The State Observation notice was obtained through Patient Access that date. Patient's mother Cornelius Hussein was him during CM intake.  They live

## 2023-12-08 LAB — VIT B12 SERPL-MCNC: 303 PG/ML (ref 193–986)

## 2023-12-08 NOTE — CARE COORDINATION
12/08/23 0824   Services At/After Discharge   Transition of Care Consult (CM Consult) N/A   Mode of Transport at Discharge Other (see comment)  (POV family)   Confirm Follow Up Transport Family         Mr. Quintin Pickett was discharged home yesterday evening. No needs identified. Patient is aware and agrees with dc plan. He is aware to contact CM for any questions or concerns post discharge. Transition of Care Plan:    RUR: n/a in obs   Prior Level of Functioning: Independent    Disposition: Home. No needs   If SNF or IPR: Date FOC offered:   Date FOC received:   Accepting facility:   Date authorization started with reference number:   Date authorization received and expires: Follow up appointments:    1. Chanel Urban MD Neurointerventional team--12/13 at 3pm   2. Joselito Aleman DNP (Neurology) 12/11 at 1:30pm.   DME needed:   Transportation at discharge:   IM/[facility] Medicare/[facility] letter given: n/a  Is patient a  and connected with [place]? If yes, was [employer] transfer form completed and [facility] notified? Caregiver Contact:   Discharge Caregiver contacted prior to discharge? Care Conference needed?    Barriers to discharge: None

## 2023-12-08 NOTE — CONSULTS
[facility]  Neurology consult note      Date:   2023    Name:  Lorrin Moritz. :  1965  MRN:  [id number]     PCP:  Camila Coffman MD    Lorrin Moritz. is a 59 y.o. male who was seen by synchronous (real-time) audio-video technology on 2023 for Tingling (Right arm)      Assessment & Plan:   Patient presents with symptoms of transient altered mental status and right upper extremity weakness and paresthesias. Symptoms resolved within 30 minutes. MRI scan of the brain was obtained  similar in appearance when compared to previous study on . Findings of subacute right PCA stroke would not explain patient's right arm weakness/paresthesias. Patient's right arm paresthesias may be radicular in nature versus transient cerebral ischemia with infarction within the brainstem or on the left hemisphere with resolution of symptoms, versus brainstem infarct too small to be visualized. Continue dual antiplatelet therapy with aspirin 81, Plavix 75  Patient was not compliant with his Lipitor recommend he continues to take 80 mg nightly  Patient is to have follow-up with neurointerventional team.  This has been scheduled and ready on about   Patient has VV follow-up with neurology on Monday,  at 1:30 PM.  For his headache as well as consideration of MRI of the cervical spine. Patient opted to have this done as an outpatient. 5.   Patient is stable from a neurological standpoint to be discharged as he remains at his neurological baseline    Virtual F/U with Le Brandon DNP, ACNP-bc on 2023 @ 1:30PM    Patient and/or family verbalized understand of all instructions and all questions/concerns were addressed. Safety/side effects of medications discussed. ICD-10-CM    1. Acute stroke due to ischemia (HCC)  I63.9 oxyCODONE (ROXICODONE) 5 MG immediate release tablet      2. Acute confusion  R41.0       3.  Blurred vision  H53.8 Use broad spectrum sunscreen with Helioplex and/or Avobenzone with a minimum of SPF 30 daily.  Apply 20 minutes before going outdoors.  Reapply every 2 hours that you are exposed to the sun, more frequently if you are sweating or swimming. Get annual full skin exams.     If you have any questions or concerns-  During the hours of 8:00 am to 5:00 pm Monday through Friday, please contact us at one of the following offices:       [facility]: [phone]  [facility]:  [phone]    Pritesh Dyer MD if out of the office on Wednesdays and Fridays.  If you call the office on one of those days, your call may be taken care of by a covering physician if it is urgent.  If it is not urgent, it may wait until he is back in the office.  Please notify the  if it is urgent and/or you would like the covering physician to address for you.  Otherwise, a nurse from Pritesh Dyer MD's office will return your call upon his return to the office.    You can also contact us anytime thru [facility] to make an appointment, questions for your provider, and medication refills.    If it is urgent that you speak with someone outside of these hours, our [facility] Call Center will be able to assist you at [phone].    Thank you for choosing [facility] for your Dermatology care.    Pritesh Dyer MD

## 2023-12-11 ENCOUNTER — TELEMEDICINE (OUTPATIENT)
Age: 58
End: 2023-12-11
Payer: MEDICAID

## 2023-12-11 DIAGNOSIS — G43.019 INTRACTABLE MIGRAINE WITHOUT AURA AND WITHOUT STATUS MIGRAINOSUS: ICD-10-CM

## 2023-12-11 DIAGNOSIS — I47.20 VT (VENTRICULAR TACHYCARDIA) (HCC): ICD-10-CM

## 2023-12-11 DIAGNOSIS — I69.90 LATE EFFECTS OF CVA (CEREBROVASCULAR ACCIDENT): Primary | ICD-10-CM

## 2023-12-11 DIAGNOSIS — M54.12 CERVICAL RADICULOPATHY: ICD-10-CM

## 2023-12-11 PROCEDURE — 99215 OFFICE O/P EST HI 40 MIN: CPT | Performed by: NURSE PRACTITIONER

## 2023-12-11 RX ORDER — AMITRIPTYLINE HYDROCHLORIDE 25 MG/1
25 TABLET, FILM COATED ORAL NIGHTLY
Qty: 30 TABLET | Refills: 11 | COMMUNITY
Start: 2023-10-18 | End: 2024-10-17

## 2023-12-11 RX ORDER — PROMETHAZINE HYDROCHLORIDE 25 MG/1
25 TABLET ORAL EVERY 6 HOURS PRN
COMMUNITY

## 2023-12-11 RX ORDER — DIVALPROEX SODIUM 500 MG/1
500 TABLET, EXTENDED RELEASE ORAL NIGHTLY
Qty: 30 TABLET | Refills: 5 | COMMUNITY
Start: 2023-10-18 | End: 2024-04-15

## 2023-12-11 NOTE — PROGRESS NOTES
established patient. We discussed the expected course, resolution and complications of the diagnosis(es) in detail. Medication risks, benefits, costs, interactions, and alternatives were discussed as indicated. I advised him to contact the office if his condition worsens, changes or fails to improve as anticipated. He expressed understanding with the diagnosis(es) and plan. Carley Garciatimoteo., was evaluated through a synchronous (real-time) audio-video encounter. The patient (or guardian if applicable) is aware that this is a billable service, which includes applicable co-pays. This Virtual Visit was conducted with patient's (and/or legal guardian's) consent. Patient identification was verified, and a caregiver was present when appropriate.      The patient was located at Home:   [facility] [place] [address]    Provider was located at Home ([address] [place] [address]): [address] [place] [address] FAVIAN, SARAP-bc

## 2023-12-12 PROBLEM — I69.90 LATE EFFECTS OF CVA (CEREBROVASCULAR ACCIDENT): Status: ACTIVE | Noted: 2023-10-18

## 2023-12-12 PROBLEM — I47.20 VT (VENTRICULAR TACHYCARDIA) (HCC): Status: ACTIVE | Noted: 2023-12-12

## 2023-12-12 PROBLEM — M17.0 TRICOMPARTMENT OSTEOARTHRITIS OF BOTH KNEES: Status: ACTIVE | Noted: 2018-11-01

## 2023-12-12 PROBLEM — K57.90 DIVERTICULOSIS: Status: ACTIVE | Noted: 2023-01-04

## 2023-12-12 PROBLEM — F17.200 SMOKER: Status: ACTIVE | Noted: 2022-09-30

## 2023-12-12 PROBLEM — G43.019 INTRACTABLE MIGRAINE WITHOUT AURA AND WITHOUT STATUS MIGRAINOSUS: Status: ACTIVE | Noted: 2023-12-12

## 2023-12-12 PROBLEM — I25.2 HISTORY OF ST ELEVATION MYOCARDIAL INFARCTION (STEMI): Status: ACTIVE | Noted: 2019-10-15

## 2023-12-12 RX ORDER — METHYLPREDNISOLONE 4 MG/1
TABLET ORAL
Qty: 1 KIT | Refills: 0 | Status: SHIPPED | OUTPATIENT
Start: 2023-12-12

## 2023-12-12 RX ORDER — IBUPROFEN 600 MG/1
600 TABLET ORAL EVERY 4 HOURS PRN
COMMUNITY
Start: 2023-07-11 | End: 2023-12-12 | Stop reason: ALTCHOICE

## 2023-12-12 RX ORDER — GABAPENTIN 300 MG/1
CAPSULE ORAL
Qty: 90 CAPSULE | Refills: 3 | Status: SHIPPED | OUTPATIENT
Start: 2023-12-12 | End: 2024-12-12

## 2023-12-12 NOTE — ASSESSMENT & PLAN NOTE
Mr. Shavon Miller has a history of a right PCA stroke September 2023 status post thrombolytics. He does have with residual decreased sensation left upper and left lower extremity and right homonymous hemianopsia. Patient states he has been compliant with his aspirin and Plavix, however he was not consistently taking his Lipitor and he continues to smoke. Patient was readmitted to [facility] December 6 with transient altered mental status and right upper extremity weakness and paresthesias. 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 No intracranial aneurysm. - Lipid profile:      -12/7/2023: Total cholesterol 194, HDL 48, .8     -9/27/2023: Total cholesterol 156, HDL 49, LDL 80.8    It is unclear if the stroke in the right thalamic region is acute or subacute in nature, the right thalamic stroke would explain the decrease in sensation left upper extremity and left lower extremity, but would not explain the transient weakness and paresthesias of the right upper extremity. Patient does have risk factors for stroke, his symptoms may have represented a left hemispheric TIA versus being radicular in nature. 1.  Continue aspirin 81, Plavix 75  2. Patient's LDL went from 80.8 in September 2 128.8 in December. I emphasized the importance of compliance with Lipitor. He is taking 80 mg nightly, goal LDL below 70  3. I emphasized the importance of tobacco cessation  4. With hospitalization his blood pressures were noted to be: Systolic blood pressures 224R to 818V with diastolics 39R to 471W. Goal blood pressures: Systolic less than 167, diastolic less than 80. Currently on Cozaar 50 mg daily. Will defer to primary provider for management.   5.  He has outpatient follow-up on December 13

## 2023-12-12 NOTE — ASSESSMENT & PLAN NOTE
Patient has a history of MI. With his stroke in September he had a Holter monitor placed which showed heavy burden of SVE's including SVT, AT, and RT. As well as a short run of nonsustained V.T. Patient previously followed by Dr. Beverly Martinez with cardiology    1. Will schedule follow-up with cardiology in [address] and/or outpatient referral to EP for loop recorder placement  2. Discussed with this with the patient who is in agreement with this plan of care.

## 2023-12-12 NOTE — ASSESSMENT & PLAN NOTE
Continues with chronic daily headache primarily posterior right occipital region radiating forward. He has had no relief on his current medication regimen of Depakote  mg at night and amitriptyline 25 mg at night. Has taken opioids to take the edge off. Suspect patient's right PCA stroke in September triggered headaches and now is having rebound headaches from acetaminophen as well as opioid use. 1.  Advised patient to discontinue use of opioids for headache management / from a neurological standpoint do not recommend prescribing or administering opioid medications to manage his migrainous type headache. 2.  Will prescribe a Medrol Dosepak  3. Continue Depakote  mg at night  4. Continue amitriptyline 25 mg at night  5. Will prescribe gabapentin 300 mg 1 p.o. BID for 3 to 4 days, if not sedate and still having headaches increase to 300 mg 3 times a day  6.   Will keep close follow-up with the patient

## 2023-12-12 NOTE — PATIENT INSTRUCTIONS
Mr. Jorge Solomon,    It was a pleasure seeing you in follow-up today. I cannot explain your right upper extremity weakness and paresthesias that prompted her admission based on your MRI as it does not show evidence of a left hemispheric stroke. Your symptoms could have been related to transient cerebral ischemia (mini stroke) versus emanating from her cervical spine.   - For stroke prevention continue to take your aspirin 81mg, Plavix 75mg, Lipitor 80mg. If you require any refills do not hesitate to have your pharmacy send a refill request and I can take care of that for you. - Continue working on abstaining from tobacco entirely to minimize the risk of recurrent stroke   - I will also order an MRI of the cervical spine    Keep your follow-up with the neurointerventional team at [facility] on December 13 at 3 PM.  I know you have concerns about your occluded artery (your right posterior cerebral artery) is well as your aneurysm. 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 However we will be seeing the experts in that field and they will be able to explain the results of your angiogram of your head as well as plan of care and treatment options as indicated. For your headache your headache is likely multifactorial in nature. I feel it was triggered due to your stroke in September. I believe you are now in a medication overuse headache cycle due to the opioids and opioids containing Tylenol prescriptions that you have been given. To break this cycle you need to discontinue use of your oxycodone (based on our conversation I believe you do not have any left). I am going to prescribe you a Medrol Dosepak along with gabapentin take the gabapentin 300 mg tablets in the morning in the evening after 3 or 4 days if you are not sedate you can take the gabapentin 300 mg 3 times a day.     Due to your 2-week monitor in September had significant amounts of

## 2023-12-12 NOTE — ASSESSMENT & PLAN NOTE
Continues with chronic daily headache primarily posterior right occipital region radiating forward. He has had no relief on his current medication regimen of Depakote ER 1000 mg at night and amitriptyline 50 mg at night. Continues to take opioids as a take the edge off.

## 2023-12-20 ENCOUNTER — OFFICE VISIT (OUTPATIENT)
Age: 58
End: 2023-12-20
Payer: MEDICAID

## 2023-12-20 VITALS
SYSTOLIC BLOOD PRESSURE: 118 MMHG | HEART RATE: 89 BPM | BODY MASS INDEX: 24.62 KG/M2 | OXYGEN SATURATION: 99 % | TEMPERATURE: 97.8 F | DIASTOLIC BLOOD PRESSURE: 68 MMHG | HEIGHT: 74 IN | WEIGHT: 191.8 LBS

## 2023-12-20 DIAGNOSIS — Q27.30 AVM (ARTERIOVENOUS MALFORMATION): ICD-10-CM

## 2023-12-20 DIAGNOSIS — Q27.30 AVM (ARTERIOVENOUS MALFORMATION): Primary | ICD-10-CM

## 2023-12-20 PROCEDURE — 3074F SYST BP LT 130 MM HG: CPT | Performed by: RADIOLOGY

## 2023-12-20 PROCEDURE — 99204 OFFICE O/P NEW MOD 45 MIN: CPT | Performed by: RADIOLOGY

## 2023-12-20 PROCEDURE — 3078F DIAST BP <80 MM HG: CPT | Performed by: RADIOLOGY

## 2023-12-20 NOTE — PATIENT INSTRUCTIONS
Diagnostic Angiogram on 1/23/2024 at [facility].   Arrival time 8:00 am at Patient Registration. Main Hospital Entrance.  Blood work will be needed at least one week prior to procedure at a [facility]   [facility].  -No eating or drinking after midnight the night prior to Angiogram.  -Take all morning medications with sips of water the morning of the angiogram.  -You must have a  to drive you home upon discharge.  -Recommend you have someone with you for at least 24-48 hours post procedure.  -No metal or glue in hair.

## 2023-12-27 ENCOUNTER — TELEMEDICINE (OUTPATIENT)
Age: 58
End: 2023-12-27
Payer: MEDICAID

## 2023-12-27 DIAGNOSIS — I69.90 LATE EFFECTS OF CVA (CEREBROVASCULAR ACCIDENT): ICD-10-CM

## 2023-12-27 DIAGNOSIS — I47.20 VT (VENTRICULAR TACHYCARDIA) (HCC): ICD-10-CM

## 2023-12-27 DIAGNOSIS — G44.201 INTRACTABLE TENSION-TYPE HEADACHE, UNSPECIFIED CHRONICITY PATTERN: Primary | ICD-10-CM

## 2023-12-27 DIAGNOSIS — M54.12 CERVICAL RADICULOPATHY: ICD-10-CM

## 2023-12-27 PROCEDURE — 99215 OFFICE O/P EST HI 40 MIN: CPT | Performed by: NURSE PRACTITIONER

## 2023-12-27 NOTE — PATIENT INSTRUCTIONS
Mr. Berkley Greco, it was a pleasure seeing you in follow-up today. For your AVM you will have your diagnostic cerebral angiogram on January 23. Your headaches are not improved. We have tried amitriptyline, Depakote, and gabapentin with no relief. This may be a tension type headache, but you do have migrainous type symptoms. Will wean you off gabapentin. Take one 300 mg tablet twice a day for 3 days and then take one 300 mg tablet at night for 3 days. I will start the preauthorization process for Nurtec  one 75 mg tablet every other day  I will re-prescribe the Medrol Dosepak, complete 1 pack and if needed you will have 1 refill and you should take that  We will prescribe Fioricet short term for abortive therapy. Take one to two by mouth on onset of headache and you may take and additional pill in 40 min if needed. No more than 3 in a day. Can't use more than 10 days a month or more than 2 days in a seven day cycle.   Pending the results of your MRI I will refer you to physical therapy for your cervical radicular symptoms symptoms  We have scheduled follow-up January 17 at 1:30 PM

## 2023-12-28 RX ORDER — BUTALBITAL, ACETAMINOPHEN AND CAFFEINE 50; 325; 40 MG/1; MG/1; MG/1
TABLET ORAL
Qty: 30 TABLET | Refills: 0 | Status: SHIPPED | OUTPATIENT
Start: 2023-12-28

## 2023-12-28 RX ORDER — RIMEGEPANT SULFATE 75 MG/75MG
75 TABLET, ORALLY DISINTEGRATING ORAL EVERY OTHER DAY
Qty: 16 TABLET | Refills: 11 | Status: SHIPPED | OUTPATIENT
Start: 2023-12-28

## 2023-12-28 RX ORDER — METHYLPREDNISOLONE 4 MG/1
TABLET ORAL
Qty: 1 KIT | Refills: 1 | Status: SHIPPED | OUTPATIENT
Start: 2023-12-28

## 2023-12-28 NOTE — ASSESSMENT & PLAN NOTE
Patient has a history of MI. With his stroke in September he had a Holter monitor placed which showed heavy burden of SVE's including SVT, AT, and RT. As well as a short run of nonsustained V.T. Patient previously followed by Dr. Yari Cummings with cardiology    1. Will refer to cardiology due to his history of MI and the burden of SVE's including SVT, AT, RT, and VT  2. Patient currently is on Cozaar, will consider transitioning to or adding a beta-blocker such as propranolol to metoprolol to his medication regimen. Favor propranolol due to his headache.

## 2023-12-28 NOTE — ASSESSMENT & PLAN NOTE
The right thalamic stroke would explain the decrease in sensation left upper extremity and left lower extremity, but would not explain the transient weakness and paresthesias of the right upper extremity. His symptoms may have represented a left hemispheric TIA versus being radicular in nature. 1.  MRI of the cervical spine ordered December 29, 2023 at [facility]  2. Pending results of MRI, will refer back to physical therapy. I explained to the patient this was not for his stroke but due to his headache, and transient right upper extremity weakness some numbness which may be related to cervical radiculopathy.

## 2024-01-05 ENCOUNTER — TELEPHONE (OUTPATIENT)
Age: 59
End: 2024-01-05

## 2024-01-05 NOTE — TELEPHONE ENCOUNTER
Patient requested to stay at the [facility] from 1/22/2024 - 1/23/2024.   Referral sent 1/5/2024

## 2024-01-17 ENCOUNTER — TELEMEDICINE (OUTPATIENT)
Age: 59
End: 2024-01-17
Payer: MEDICAID

## 2024-01-17 DIAGNOSIS — I69.90 LATE EFFECTS OF CVA (CEREBROVASCULAR ACCIDENT): ICD-10-CM

## 2024-01-17 DIAGNOSIS — G43.019 INTRACTABLE MIGRAINE WITHOUT AURA AND WITHOUT STATUS MIGRAINOSUS: Primary | ICD-10-CM

## 2024-01-17 DIAGNOSIS — I47.20 VT (VENTRICULAR TACHYCARDIA) (HCC): ICD-10-CM

## 2024-01-17 PROCEDURE — 99214 OFFICE O/P EST MOD 30 MIN: CPT | Performed by: NURSE PRACTITIONER

## 2024-01-17 NOTE — ASSESSMENT & PLAN NOTE
Patient has a history of MI.  With his stroke in September he had a Holter monitor placed which showed heavy burden of SVE's including SVT, AT, and RT.  As well as a short run of nonsustained V.T. Patient previously followed by Dr. Rivero with cardiology     1.  Cardiology referral was placed, but not scheduled, due to his history of MI and the burden of SVE's including SVT, AT, RT, and VT  2.  Patient currently is on Cozaar, will consider transitioning to or adding a beta-blocker such as propranolol to metoprolol to his medication regimen.  Favor propranolol due to his headache.

## 2024-01-17 NOTE — PROGRESS NOTES
disease     Diverticulosis     GERD (gastroesophageal reflux disease)     Hypertension     Pneumonia     PTSD (post-traumatic stress disorder)     Shingles     Tubular adenoma      Past Surgical History:   Procedure Laterality Date    KNEE ARTHROSCOPY Right     ORTHOPEDIC SURGERY      removed a mass from right palm oct 13 2017right wrist fxr repair 2016 dec    ROTATOR CUFF REPAIR       Social History     Tobacco Use    Smoking status: Every Day     Current packs/day: 0.25     Types: Cigarettes    Smokeless tobacco: Never    Tobacco comments:     Quit smokin-5 ciggs per day.   Substance Use Topics    Alcohol use: Yes     Alcohol/week: 1.0 standard drink of alcohol    Drug use: No       ROS  General: Denies blurred vision, double vision, fevers, chills, weight loss, weight gain.  Cardiovascular: Denies chest pain or palpitations. Denies edema.  Respiratory:  Denies SOB or cough.  GI: Denies nausea, vomiting or diarrhea. Denies loss of bowel control.  :  Denies hematuria or dysuria. Denies frequency, urgency. Denies loss of bladder control.  Neurologic: Per the Physical Exam.  Musculoskeletal:  Denies back pain, muscle pain, hip pain.  Psychiatric: Denies depressive symptoms. Estephania anxiety.    Objective:   Last documented vital signs 2023: Blood pressure 118/68, pulse 89, oxygen saturations 99%.    BMI 24.63 kg/m²    Physical Exam:  General:  Well developed, well nourished, and groomed individual in no acute distress.  HEENT: normocephalic  Neck: trach is midline, upon observation neck appears to be supple.  Cardiovascular: Unable to reliably assess due to telehealth visit  Respiratory: Upon observation equal chest expansion, nonlabored respirations  GI: Unable to assess due to telehealth visit  : Unable to assess due to telehealth visit  Psych/Mental Health:  Pleasant mood and affect        2023     2:01 PM 10/17/2022     9:46 AM 8/10/2022    12:41 PM   PHQ-9    Little interest or pleasure

## 2024-01-17 NOTE — ASSESSMENT & PLAN NOTE
Mr. Josue has a history of a right PCA stroke September 2023 status post thrombolytics.  He does have with residual decreased sensation left upper and left lower extremity and left homonymous hemianopsia. Patient was readmitted to [facility] December 6 with transient altered mental status and right upper extremity weakness and paresthesias.  The symptoms resolved within 30 minutes.  Patient states he has been compliant with his aspirin and Plavix, however he was not consistently taking his Lipitor and he continues to smoke.   - MRI scan of the brain shows right thalamic acute versus subacute lacunar infarct   - Lipid profile:      -12/7/2023: Total cholesterol 194, HDL 48, .8     -9/27/2023: Total cholesterol 156, HDL 49, LDL 80.8     It is unclear if the stroke in the right thalamic region is acute or subacute in nature, the right thalamic stroke would explain the decrease in sensation left upper extremity and left lower extremity, but would not explain the transient weakness and paresthesias of the right upper extremity.  Patient does have risk factors for stroke, his symptoms may have represented a left hemispheric TIA versus being radicular in nature.     1.  Continue aspirin 81, Plavix 75 (Plavix is on hold right now due to diagnostic cerebral angiogram January 23)  2.  Continue taking 80 mg nightly, goal LDL below 70  3.  I emphasized the importance of tobacco cessation  4.  With hospitalization his blood pressures were noted to be: Systolic blood pressures 130s to 150s with diastolics 90s to 100s.  Goal blood pressures: Systolic less than 140, diastolic less than 80.  Currently on Cozaar 50 mg daily. At follow-up will discuss adding too or transitioning from Cozaar to beta-blocker such as propranolol which may offer additional benefit of preventative treatment for headache as benefit of rate control as patient has frequent PACs and SVTs.  5.  Due to poor sleep hygiene, snoring,

## 2024-01-17 NOTE — PATIENT INSTRUCTIONS
Mr. Josue, it was a pleasure seeing you in follow-up today.    You continue to have headaches.  You are awaiting preauthorization on the Nurtec.  I will reach out to my office today to see what the status is.    You have your cerebral angiogram on January 23 with follow-up January 31, 2024 with the neurointerventional surgeon.  At that time they will discuss the results of your angiogram and the plan of care moving forward.    For your blurred vision, which I suspect is triggering your dizziness, please schedule an appointment with an optometrist or an ophthalmologist to have a comprehensive eye exam.    I will update you through Tern on the status of your Nurtec.    If you have questions do not hesitate to reach out to me through my office or through Tern.    Office Policies    Phone calls/patient messages:  Please allow up to 24 hours for someone in the office to contact you about your call or message. Be mindful your provider may be out of the office or your message may require further review. We encourage you to use Tern for your messages as this is a faster, more efficient way to communicate with our office    Medication Refills:  Prescription medications require up to 48 business hours to process. We encourage you to use Tern for your refills.     For controlled medications: Please allow up to 72 business hours to process. Certain medications may require you to  a written prescription at our office.    NO narcotic/controlled medications will be prescribed after 4pm Monday through Friday or on weekends    Form/Paperwork Completion:  We ask that you allow 7-14 business days. You may also download your forms to Tern to have your doctor print off.

## 2024-01-17 NOTE — ASSESSMENT & PLAN NOTE
Continues with chronic daily headache primarily posterior right occipital region radiating forward, or headache could begin bifrontal and radiate back.  Has migrainous type symptoms such as nausea (fortunately no photophobia or phonophobia, however he also has symptoms consistent with a tension type headache.     Medications Tried and Failed either due to ineffective, side effects, or contraindicated:  Preventative:  -Depakote ER prescribed in October, doubled in November.  Currently on 1000mg with no relief  -Amitriptyline  prescribed in October, doubled in November.  Currently on 50mg with no relief  -Gabapentin -ineffective     Abortive:  Nonsteroidals: Taking ibuprofen to abort the headache, no relief  Acetaminophen: No relief not currently taking  Oxycodone: Prescribed at discharge, does take the edge off -opioids been discontinued  Triptans - contraindicated due to his history of posterior circulation stroke as well as hypertension     1.  Continue Depakote  mg 2 tablets at night  2.  Continue amitriptyline 25 mg 2 tablets at night  3.  Nurtec 75 mg q. OD for preventative therapy -pending authorization.  Will reach out to our office to get an update on status.  4.  Pending response to Nurtec will look to wean off Depakote and/or amitriptyline as it is offered him little relief  7.  Short-term will use Fioricet to take the edge off his headache, I would not anticipate more than 1 month supply. He can take one to two by mouth on onset of migraine may take and additional pill in 40 min if needed. No more than 3 in a day. Can't use more than 10 days a month or more than 2 days in a seven day cycle.

## 2024-01-23 ENCOUNTER — TELEPHONE (OUTPATIENT)
Age: 59
End: 2024-01-23

## 2024-01-23 ENCOUNTER — HOSPITAL ENCOUNTER (OUTPATIENT)
Facility: HOSPITAL | Age: 59
Discharge: HOME | End: 2024-01-23
Attending: RADIOLOGY | Admitting: RADIOLOGY
Payer: MEDICAID

## 2024-01-23 VITALS
DIASTOLIC BLOOD PRESSURE: 85 MMHG | SYSTOLIC BLOOD PRESSURE: 148 MMHG | RESPIRATION RATE: 19 BRPM | TEMPERATURE: 97.8 F | HEART RATE: 74 BPM | OXYGEN SATURATION: 99 %

## 2024-01-23 DIAGNOSIS — Q27.30 AVM (ARTERIOVENOUS MALFORMATION): ICD-10-CM

## 2024-01-23 PROBLEM — Q28.2 CEREBRAL ARTERIOVENOUS MALFORMATION (AVM): Status: ACTIVE | Noted: 2023-12-06

## 2024-01-23 LAB
ALBUMIN SERPL-MCNC: 3.6 G/DL (ref 3.5–5)
ALBUMIN/GLOB SERPL: 1.2 (ref 1.1–2.2)
ALP SERPL-CCNC: 61 U/L (ref 45–117)
ALT SERPL-CCNC: 23 U/L (ref 12–78)
ANION GAP SERPL CALC-SCNC: 5 MMOL/L (ref 5–15)
AST SERPL-CCNC: 16 U/L (ref 15–37)
BILIRUB SERPL-MCNC: 0.6 MG/DL (ref 0.2–1)
BUN SERPL-MCNC: 9 MG/DL (ref 6–20)
BUN/CREAT SERPL: 7 (ref 12–20)
CALCIUM SERPL-MCNC: 8.9 MG/DL (ref 8.5–10.1)
CHLORIDE SERPL-SCNC: 107 MMOL/L (ref 97–108)
CO2 SERPL-SCNC: 27 MMOL/L (ref 21–32)
CREAT SERPL-MCNC: 1.37 MG/DL (ref 0.7–1.3)
ERYTHROCYTE [DISTWIDTH] IN BLOOD BY AUTOMATED COUNT: 12 % (ref 11.5–14.5)
GLOBULIN SER CALC-MCNC: 3 G/DL (ref 2–4)
GLUCOSE SERPL-MCNC: 103 MG/DL (ref 65–100)
HCT VFR BLD AUTO: 40.6 % (ref 36.6–50.3)
HGB BLD-MCNC: 13.9 G/DL (ref 12.1–17)
MCH RBC QN AUTO: 32.4 PG (ref 26–34)
MCHC RBC AUTO-ENTMCNC: 34.2 G/DL (ref 30–36.5)
MCV RBC AUTO: 94.6 FL (ref 80–99)
NRBC # BLD: 0 K/UL (ref 0–0.01)
NRBC BLD-RTO: 0 PER 100 WBC
PLATELET # BLD AUTO: 207 K/UL (ref 150–400)
PMV BLD AUTO: 10 FL (ref 8.9–12.9)
POTASSIUM SERPL-SCNC: 3.9 MMOL/L (ref 3.5–5.1)
PROT SERPL-MCNC: 6.6 G/DL (ref 6.4–8.2)
RBC # BLD AUTO: 4.29 M/UL (ref 4.1–5.7)
SODIUM SERPL-SCNC: 139 MMOL/L (ref 136–145)
WBC # BLD AUTO: 5.4 K/UL (ref 4.1–11.1)

## 2024-01-23 PROCEDURE — 6370000000 HC RX 637 (ALT 250 FOR IP): Performed by: RADIOLOGY

## 2024-01-23 PROCEDURE — 6360000002 HC RX W HCPCS: Performed by: RADIOLOGY

## 2024-01-23 PROCEDURE — 36415 COLL VENOUS BLD VENIPUNCTURE: CPT

## 2024-01-23 PROCEDURE — 80053 COMPREHEN METABOLIC PANEL: CPT

## 2024-01-23 PROCEDURE — 85027 COMPLETE CBC AUTOMATED: CPT

## 2024-01-23 PROCEDURE — 2500000003 HC RX 250 WO HCPCS: Performed by: RADIOLOGY

## 2024-01-23 PROCEDURE — C1769 GUIDE WIRE: HCPCS

## 2024-01-23 RX ORDER — MIDAZOLAM HYDROCHLORIDE 2 MG/2ML
INJECTION, SOLUTION INTRAMUSCULAR; INTRAVENOUS PRN
Status: COMPLETED | OUTPATIENT
Start: 2024-01-23 | End: 2024-01-23

## 2024-01-23 RX ORDER — LIDOCAINE 40 MG/G
CREAM TOPICAL PRN
Status: COMPLETED | OUTPATIENT
Start: 2024-01-23 | End: 2024-01-23

## 2024-01-23 RX ORDER — LIDOCAINE HYDROCHLORIDE 10 MG/ML
INJECTION, SOLUTION EPIDURAL; INFILTRATION; INTRACAUDAL; PERINEURAL PRN
Status: COMPLETED | OUTPATIENT
Start: 2024-01-23 | End: 2024-01-23

## 2024-01-23 RX ORDER — BUTALBITAL, ACETAMINOPHEN AND CAFFEINE 50; 325; 40 MG/1; MG/1; MG/1
2 TABLET ORAL ONCE
Status: COMPLETED | OUTPATIENT
Start: 2024-01-23 | End: 2024-01-23

## 2024-01-23 RX ORDER — SODIUM BICARBONATE 42 MG/ML
INJECTION, SOLUTION INTRAVENOUS PRN
Status: COMPLETED | OUTPATIENT
Start: 2024-01-23 | End: 2024-01-23

## 2024-01-23 RX ORDER — VERAPAMIL HYDROCHLORIDE 2.5 MG/ML
INJECTION, SOLUTION INTRAVENOUS PRN
Status: COMPLETED | OUTPATIENT
Start: 2024-01-23 | End: 2024-01-23

## 2024-01-23 RX ORDER — HEPARIN SODIUM 1000 [USP'U]/ML
INJECTION, SOLUTION INTRAVENOUS; SUBCUTANEOUS PRN
Status: COMPLETED | OUTPATIENT
Start: 2024-01-23 | End: 2024-01-23

## 2024-01-23 RX ORDER — FENTANYL CITRATE 50 UG/ML
INJECTION, SOLUTION INTRAMUSCULAR; INTRAVENOUS PRN
Status: COMPLETED | OUTPATIENT
Start: 2024-01-23 | End: 2024-01-23

## 2024-01-23 RX ADMIN — BUTALBITAL, ACETAMINOPHEN, AND CAFFEINE 2 TABLET: 50; 325; 40 TABLET ORAL at 11:13

## 2024-01-23 RX ADMIN — NITROGLYCERIN 1 INCH: 20 OINTMENT TOPICAL at 09:44

## 2024-01-23 RX ADMIN — LIDOCAINE HYDROCHLORIDE 1 ML: 10 INJECTION, SOLUTION EPIDURAL; INFILTRATION; INTRACAUDAL; PERINEURAL at 09:54

## 2024-01-23 RX ADMIN — MIDAZOLAM HYDROCHLORIDE 1 MG: 1 INJECTION, SOLUTION INTRAMUSCULAR; INTRAVENOUS at 09:53

## 2024-01-23 RX ADMIN — FENTANYL CITRATE 50 MCG: 50 INJECTION, SOLUTION INTRAMUSCULAR; INTRAVENOUS at 09:53

## 2024-01-23 RX ADMIN — VERAPAMIL HYDROCHLORIDE 2.5 MG: 2.5 INJECTION, SOLUTION INTRAVENOUS at 09:59

## 2024-01-23 RX ADMIN — SODIUM BICARBONATE 0.5 MEQ: 42 INJECTION, SOLUTION INTRAVENOUS at 10:01

## 2024-01-23 RX ADMIN — LIDOCAINE 1 TUBE: 40 CREAM TOPICAL at 09:45

## 2024-01-23 RX ADMIN — HEPARIN SODIUM 1000 UNITS: 1000 INJECTION INTRAVENOUS; SUBCUTANEOUS at 10:00

## 2024-01-23 RX ADMIN — Medication 100 MCG: at 10:03

## 2024-01-23 ASSESSMENT — PAIN DESCRIPTION - LOCATION
LOCATION: HEAD

## 2024-01-23 ASSESSMENT — PAIN DESCRIPTION - ORIENTATION
ORIENTATION: ANTERIOR

## 2024-01-23 ASSESSMENT — PAIN DESCRIPTION - ONSET
ONSET: GRADUAL
ONSET: SUDDEN
ONSET: SUDDEN

## 2024-01-23 ASSESSMENT — PULMONARY FUNCTION TESTS
PIF_VALUE: 0

## 2024-01-23 ASSESSMENT — PAIN DESCRIPTION - PAIN TYPE
TYPE: ACUTE PAIN

## 2024-01-23 ASSESSMENT — PAIN DESCRIPTION - DESCRIPTORS
DESCRIPTORS: NAGGING
DESCRIPTORS: ACHING
DESCRIPTORS: NAGGING

## 2024-01-23 ASSESSMENT — PAIN SCALES - GENERAL
PAINLEVEL_OUTOF10: 5
PAINLEVEL_OUTOF10: 7
PAINLEVEL_OUTOF10: 5
PAINLEVEL_OUTOF10: 7
PAINLEVEL_OUTOF10: 5

## 2024-01-23 ASSESSMENT — PAIN DESCRIPTION - FREQUENCY
FREQUENCY: CONTINUOUS
FREQUENCY: CONTINUOUS

## 2024-01-23 NOTE — PROGRESS NOTES
Report received from KARL Chávez and care assumed    1040  Patient procedure completed and patient tolerated without difficulty, patient reports headache, Dr. Franklin notified and received order to administer Fioricet as patient takes at home for headache    1245  Procedure:cerebral angiogram    Sedation:1 milligram versed and 50 micrograms fentanyl    Site:right radial    Discharge Details: discharge teaching completed with patient, understanding verbalized, reports headache improved, radial site clean, dry, and intact, vitals stable, patient discharged home via wheelchair with significant other

## 2024-01-23 NOTE — TELEPHONE ENCOUNTER
Received prior authorization request from [employer] [facility] in regards to patient's Nurtec.  Attempted to process patient's nurtec over the phone as a urgent request    [employer] representative advised she was able to locate the patient but was unable to proceed as the  I gave as well as the address don't match what they have on file    Advised I would reach out to the patient     Called patient received vm on the first ring no vm set up currently unable to leave a message    Patient needs to update insurance with [facility] as well as this office if necessary     Forwarding to provider as an FYI

## 2024-01-23 NOTE — H&P
murmur, click, rub or gallop  Extremities: extremities normal, atraumatic, no cyanosis or edema  Skin: Skin warm to touch. Appropriate for ethnicity.     Neurologic Exam:  Mental Status:  Alert and oriented x 4.  Appropriate affect, mood and behavior.       Language:    Normal fluency, repetition, comprehension and naming.    Cranial Nerves:   Pupils equal, round and reactive to light.     Left eye partial hemianopia. Left visual neglect, otherwise visual fields intact.      Extraocular movements intact.       Facial sensation intact.     Full facial strength, no asymmetry.      No dysarthria. Tongue protrudes to midline, palate elevates symmetrically.          Motor:    No pronator drift.      Bulk and tone normal.      5/5 power in all extremities proximally and distally.     No involuntary movements.    Sensation:    Sensation intact throughout to light touch. No neglect with tactile stimulation.      Coordination & Gait: FTN and HTS intact bilaterally. Gait deferred.     NIHSS:      1a-LOC:0    1b-Month/Age:0    1c-Open/Close Hand:0    2-Best Gaze:0    3-Visual Fields:1    4-Facial Palsy:0    5a-Left Arm:0    5b-Right Arm:0    6a-Left Le    6b-Right Le    7-Limb Ataxia:0    8-Sensory:0    9-Best Language:0    10-Dysarthria:0    11-Extinction/Inattention:1  TOTAL SCORE:2    Labs:  Lab Results   Component Value Date/Time     2023 05:56 AM    K 3.8 2023 05:56 AM     2023 05:56 AM    CO2 28 2023 05:56 AM    BUN 11 2023 05:56 AM    GFRAA >60 2021 02:24 PM     Lab Results   Component Value Date/Time    WBC 6.2 2023 05:56 AM    HGB 13.4 2023 05:56 AM    HCT 40.3 2023 05:56 AM     2023 05:56 AM    MCV 94.2 2023 05:56 AM     Lab Results   Component Value Date/Time    MCH 31.3 2023 05:56 AM    MCHC 33.3 2023 05:56 AM    MG 1.8 2021 02:24 PM       RECENT IMAGING:  CTH 2023: 1. No evidence of acute infarct or

## 2024-01-23 NOTE — DISCHARGE INSTRUCTIONS
ANY LEGAL DECISIONS OR SIGN LEGAL DOCUMENTS FOR 24 HOURS. DO NOT DRINK ALCOHOL, TAKE ANY MEDICATIONS UNLESS PRESCRIBED BY YOUR DOCTOR. IF YOU ARE A CAREGIVER, SOMEONE SHOULD TAKE THAT ROLE FOR 24 HOURS.       Side effects of sedation medications and other medications used today have been reviewed  Those side effects can include but are not limited to: dizziness, drowsiness, poor balance, fatigue, sleepiness. Take precautions at home to prevent falls, such as assistance with walking or stairs if allowed and /or when needed or position changes. Allergic or adverse reactions could include nausea, itching, hives, dizziness, or anything else out of the ordinary.       Should you experience any of these significant changes, please call [phone] between the hours of 7:30 am and 3:30 pm or [phone] after hours. After hours, ask the  to page the X-ray Technologist, and describe the problem to the technologist. If you are experiencing chest pain, shortness of breath, altered mental state, unusual bleeding or any other emergent symptom you should call [phone] immediately.

## 2024-01-23 NOTE — TELEPHONE ENCOUNTER
Patient was seen earlier today for diagnostic cerebral angiogram. I called patient to inform him to resume his aspirin and Plavix as prescribed. Patient verbalized understanding.     Walt Morales [facility]  Neurocritical care NP/Neurointerventional Surgery

## 2024-01-23 NOTE — PROGRESS NOTES
Pt arrives ambulatory to angio department accompanied by GF for Diagnostic cerebral angiogram with moderate sedation procedure. All assessments completed and consent was reviewed.  Education given was regarding procedure, moderate sedation, post-procedure care and  management/follow-up. Opportunity for questions was provided and all questions and concerns were addressed.

## 2024-01-23 NOTE — TELEPHONE ENCOUNTER
Patient was seen earlier today for diagnostic cerebral angiogram. I called patient to inform him to resume his aspirin and Plavix as prescribed. Patient verbalized understanding.     Walt Morales [facility]  Neurocritical care NP/Neurointerventional Surgery

## 2024-01-23 NOTE — PRE SEDATION
Sedation Pre-Procedure Note    Patient Name: Joon Josue Jr.   YOB: 1965  Room/Bed: AIR/PL  Medical Record Number: [id number]  Date: 1/23/2024   Time: 8:35 AM       Indication:  Diagnostic Cerebral Angiogram for AVM    Consent: I have discussed with the patient and/or the patient representative the indication, alternatives, and the possible risks and/or complications of the planned procedure and the anesthesia methods. The patient and/or patient representative appear to understand and agree to proceed.    Vital Signs:   Vitals:    01/23/24 1005   BP: 120/79   Pulse: 74   Resp: 11   SpO2: 99%       Past Medical History:   has a past medical history of Acute MI, anterior wall (HCC), Asthma, Cervical radiculopathy, Chronic obstructive pulmonary disease (HCC), Chronic pain, Coronary heart disease, Diverticulosis, GERD (gastroesophageal reflux disease), Hypertension, Pneumonia, PTSD (post-traumatic stress disorder), Shingles, and Tubular adenoma.    Past Surgical History:   has a past surgical history that includes Rotator cuff repair; orthopedic surgery; and Knee arthroscopy (Right).    Medications:   Scheduled Meds:   Continuous Infusions:   PRN Meds:   Home Meds:   Prior to Admission medications    Medication Sig Start Date End Date Taking? Authorizing Provider   Rimegepant Sulfate (NURTEC) 75 MG TBDP Take 75 mg by mouth every other day 12/28/23   Dodie Brewer APRN - CNP   butalbital-acetaminophen-caffeine (FIORICET, ESGIC) -40 MG per tablet Take one to two by mouth on onset of migraine may take and additional pill in 40 min if needed. No more than 3 in a day. 12/28/23   Dodie Brewer, ERIK - CNP   divalproex (DEPAKOTE ER) 500 MG extended release tablet Take 2 tablets by mouth nightly 10/18/23 4/15/24  ProviderFroy MD   amitriptyline (ELAVIL) 25 MG tablet Take 2 tablets by mouth nightly 10/18/23 10/17/24  Froy Prado MD   famotidine (PEPCID) 20 MG tablet Take 1 tablet by

## 2024-01-24 NOTE — BRIEF OP NOTE
Neuro-Interventional Surgery - Brief procedure note      Patient: Joon Josue Jr. MRN: [id number]     YOB: 1965  Age: 58 y.o.  Sex: male      Service: Neuro-Interventional Surgery    Date of Procedure: 1/24/2024    Pre-Procedure Diagnosis:  Right basal ganglia AV malformation    Post-Procedure Diagnosis: SAME    Procedure(s): Catheter cerebral/cervical arteriogram    Vessels selected: Right vertebral artery, Right common carotid artery, and Left common carotid artery    Brief Description of Procedure: The arteriogram demonstrates a subcentimeter AV malformation centered in the right basal ganglia, demonstrating deep venous drainage into the basal vein of Chandra.    Moderate left carotid bifurcation stenosis identified.    Right radial artery puncture site hemostasis achieved by deploying TR band without immediate complications.    Anesthesia:Moderate Sedation    Estimated Blood Loss: 10 ml.    Specimens:  None    Implants: None    Apparent Intraoperative Complications:  None immediate    Patient Condition:  Stable    Disposition:  Same day recovery unit    Attestation:  I performed the procedure.        Signed By: Robert Franklin MD     January 24, 2024     [facility] NEUROINTERVENTIONAL SURGERY

## 2024-01-31 DIAGNOSIS — Q27.30 AVM (ARTERIOVENOUS MALFORMATION): Primary | ICD-10-CM

## 2024-02-05 PROBLEM — I49.1 PAC (PREMATURE ATRIAL CONTRACTION): Status: ACTIVE | Noted: 2024-02-05

## 2024-02-07 ENCOUNTER — HOSPITAL ENCOUNTER (OUTPATIENT)
Facility: HOSPITAL | Age: 59
Discharge: HOME | End: 2024-02-10
Attending: RADIOLOGY
Payer: MEDICAID

## 2024-02-07 DIAGNOSIS — Q27.30 AVM (ARTERIOVENOUS MALFORMATION): ICD-10-CM

## 2024-02-07 PROCEDURE — 93880 EXTRACRANIAL BILAT STUDY: CPT

## 2024-02-08 ENCOUNTER — TELEPHONE (OUTPATIENT)
Age: 59
End: 2024-02-08

## 2024-02-08 LAB
VAS LEFT CCA DIST EDV: 20.8 CM/S
VAS LEFT CCA DIST PSV: 64.7 CM/S
VAS LEFT CCA PROX EDV: 32.1 CM/S
VAS LEFT CCA PROX PSV: 96.7 CM/S
VAS LEFT ECA EDV: 41.11 CM/S
VAS LEFT ECA PSV: 194.4 CM/S
VAS LEFT ICA DIST EDV: 29.1 CM/S
VAS LEFT ICA DIST PSV: 64.9 CM/S
VAS LEFT ICA MID EDV: 31.9 CM/S
VAS LEFT ICA MID PSV: 75.5 CM/S
VAS LEFT ICA PROX EDV: 33.9 CM/S
VAS LEFT ICA PROX PSV: 105.4 CM/S
VAS LEFT ICA/CCA PSV: 1.63 NO UNITS
VAS LEFT VERTEBRAL EDV: 15.59 CM/S
VAS LEFT VERTEBRAL PSV: 45.5 CM/S
VAS RIGHT CCA DIST EDV: 32.5 CM/S
VAS RIGHT CCA DIST PSV: 89.9 CM/S
VAS RIGHT CCA PROX EDV: 24.9 CM/S
VAS RIGHT CCA PROX PSV: 90.9 CM/S
VAS RIGHT ECA EDV: 19.15 CM/S
VAS RIGHT ECA PSV: 93.9 CM/S
VAS RIGHT ICA DIST EDV: 33.9 CM/S
VAS RIGHT ICA DIST PSV: 75.3 CM/S
VAS RIGHT ICA MID EDV: 17.6 CM/S
VAS RIGHT ICA MID PSV: 56.1 CM/S
VAS RIGHT ICA PROX EDV: 16.6 CM/S
VAS RIGHT ICA PROX PSV: 52.3 CM/S
VAS RIGHT ICA/CCA PSV: 0.8 NO UNITS
VAS RIGHT VERTEBRAL EDV: 30 CM/S
VAS RIGHT VERTEBRAL PSV: 86.5 CM/S

## 2024-02-08 NOTE — TELEPHONE ENCOUNTER
Spoke to patient regarding headaches and carotid duplex results.  Patient reports daily headaches. Reports medication prescribed by Neurology not working.  Duplex reports not available at this time.  Spoke to provider. He will review results when they are available and call patient with results later today or tomorrow.  Patient advised to contact Neurology again regarding headaches.  Patient stated understanding.

## 2024-02-09 DIAGNOSIS — Q27.30 AVM (ARTERIOVENOUS MALFORMATION): Primary | ICD-10-CM

## 2024-02-09 NOTE — RESULT ENCOUNTER NOTE
Patient called and results for carotid duplex and cathter arteriogram discussed in detail.    Plan:  Referral to Dr. Noguera from Neurosurgery for gamma knife treatment of the right basal ganglia AVM  FU with PCP for low BP issues.

## 2024-02-12 ENCOUNTER — TELEPHONE (OUTPATIENT)
Age: 59
End: 2024-02-12

## 2024-02-12 NOTE — TELEPHONE ENCOUNTER
Patient called to let us know that Dr. Noguera's office does NOT take his insurance.     Patient would like to know next steps.

## 2024-02-15 ENCOUNTER — TELEPHONE (OUTPATIENT)
Age: 59
End: 2024-02-15

## 2024-02-15 NOTE — TELEPHONE ENCOUNTER
Patient called to give us a new phone number for Dr. Franklin to call next week when he gets back. (Re: next steps in lieu of Dr. Noguera, who does not take his insurance.)    Please call his NEW CELL #:  [phone]

## 2024-02-19 ENCOUNTER — TELEPHONE (OUTPATIENT)
Age: 59
End: 2024-02-19

## 2024-02-19 DIAGNOSIS — Q27.30 AVM (ARTERIOVENOUS MALFORMATION): Primary | ICD-10-CM

## 2024-02-19 NOTE — TELEPHONE ENCOUNTER
Patient called to let us know that his headaches are getting worse.   He is not able to sleep.     Patient would like to know how to navigate this prior to his NeuroSurgery appointment.    Patient requests a return call.

## 2024-02-19 NOTE — PROGRESS NOTES
Neurointerventional Surgery Clinic Note        Patient: Joon Josue Jr. MRN: [id number]  SSN: [id number]    YOB: 1965  Age: 58 y.o.  Sex: male      Subjective:      oJon Josue Jr. is a 58 y.o. male who is being seen for incidentally diagnosed right basal ganglia AV malformation on imaging performed for stroke.  Patient has had prior posterior circulation stroke including right thalamic and right PCA ischemic changes noted on MRI.    Past Medical History:   Diagnosis Date    Acute MI, anterior wall (HCC) 2016    VF arrest/lytics/[facility]/cath/no PCI    Asthma     Cervical radiculopathy     Chronic obstructive pulmonary disease (HCC)     Chronic pain     Coronary heart disease     Diverticulosis     GERD (gastroesophageal reflux disease)     Hypertension     Pneumonia     PTSD (post-traumatic stress disorder)     Shingles     Tubular adenoma      Past Surgical History:   Procedure Laterality Date    KNEE ARTHROSCOPY Right     ORTHOPEDIC SURGERY      removed a mass from right palm oct 13 2017right wrist fxr repair 2016 dec    ROTATOR CUFF REPAIR        No family history on file.  Social History     Tobacco Use    Smoking status: Every Day     Current packs/day: 0.25     Types: Cigarettes    Smokeless tobacco: Never    Tobacco comments:     Quit smokin-5 ciggs per day.   Substance Use Topics    Alcohol use: Yes     Alcohol/week: 1.0 standard drink of alcohol      Current Outpatient Medications   Medication Sig Dispense Refill    Rimegepant Sulfate (NURTEC) 75 MG TBDP Take 75 mg by mouth every other day 16 tablet 11    butalbital-acetaminophen-caffeine (FIORICET, ESGIC) -40 MG per tablet Take one to two by mouth on onset of migraine may take and additional pill in 40 min if needed. No more than 3 in a day. 30 tablet 0    divalproex (DEPAKOTE ER) 500 MG extended release tablet Take 2 tablets by mouth nightly 30 tablet 5    amitriptyline (ELAVIL) 25 MG tablet Take 2 tablets by

## 2024-02-20 NOTE — TELEPHONE ENCOUNTER
Patient called at 2:14 PM, 2/20/2024.    I informed the patient about the referral to [place] Department of neurosurgery for further assessment and treatment of his right basal ganglia AV malformation.    Regarding patient's headaches, patient is due to see neurology on 2/28/2024 and I recommended that patient discuss his headaches with the neurologist.  If the headaches keep getting worse, patient may need to go to the ER to get evaluated.    He understands and agrees with the plan.  All his questions answered to his satisfaction.    Robert Franklin MD

## 2024-02-26 ENCOUNTER — TELEPHONE (OUTPATIENT)
Age: 59
End: 2024-02-26

## 2024-02-26 NOTE — TELEPHONE ENCOUNTER
Patient called because it has been a week and he has not heard from [facility] about his referral.    Patient also mentioned that he is having ongoing severe headaches.  I gave patient the number to [facility] Neurology clinic and told him that he should reach out to Dodie Brewer about his pain.    I told patient that it sometimes takes a little time to for a specialty office like Neurosurgery to review and schedule referrals and to please be patient, but in the meantime contact Neurology about his head pain.    Patient acknowledged understanding.    I spoke to Briana CARRIZALES who suggested I message Dodie Brewer and request their office schedule patient, which I did.

## 2024-02-28 ENCOUNTER — TELEMEDICINE (OUTPATIENT)
Age: 59
End: 2024-02-28
Payer: MEDICAID

## 2024-02-28 ENCOUNTER — TELEPHONE (OUTPATIENT)
Age: 59
End: 2024-02-28

## 2024-02-28 DIAGNOSIS — I47.20 VT (VENTRICULAR TACHYCARDIA) (HCC): ICD-10-CM

## 2024-02-28 DIAGNOSIS — G43.019 INTRACTABLE MIGRAINE WITHOUT AURA AND WITHOUT STATUS MIGRAINOSUS: Primary | ICD-10-CM

## 2024-02-28 DIAGNOSIS — I69.90 LATE EFFECTS OF CVA (CEREBROVASCULAR ACCIDENT): ICD-10-CM

## 2024-02-28 PROBLEM — Q27.30 AVM (ARTERIOVENOUS MALFORMATION): Status: ACTIVE | Noted: 2024-02-28

## 2024-02-28 PROCEDURE — 99215 OFFICE O/P EST HI 40 MIN: CPT | Performed by: NURSE PRACTITIONER

## 2024-02-28 RX ORDER — BUTALBITAL, ACETAMINOPHEN AND CAFFEINE 50; 325; 40 MG/1; MG/1; MG/1
TABLET ORAL
Qty: 30 TABLET | Refills: 0 | Status: SHIPPED | OUTPATIENT
Start: 2024-02-28

## 2024-02-28 RX ORDER — DIVALPROEX SODIUM 500 MG/1
500 TABLET, EXTENDED RELEASE ORAL NIGHTLY
Qty: 30 TABLET | Refills: 5 | Status: SHIPPED | OUTPATIENT
Start: 2024-02-28 | End: 2024-08-26

## 2024-02-28 RX ORDER — AMITRIPTYLINE HYDROCHLORIDE 25 MG/1
TABLET, FILM COATED ORAL
Qty: 90 TABLET | Refills: 3 | Status: SHIPPED | OUTPATIENT
Start: 2024-02-28

## 2024-02-28 NOTE — PROGRESS NOTES
tobacco: Never    Tobacco comments:     Quit smokin-5 ciggs per day.   Substance Use Topics    Alcohol use: Yes     Alcohol/week: 1.0 standard drink of alcohol    Drug use: No       ROS  General: Denies blurred vision, double vision, fevers, chills, weight loss, weight gain.  States he is sleeping poorly because his headaches are so severe.  Cardiovascular: Denies chest pain or palpitations. Denies edema.  Respiratory:  Denies SOB or cough.  GI: Denies nausea, vomiting or diarrhea. Denies loss of bowel control.  :  Denies hematuria or dysuria. Denies frequency, urgency. Denies loss of bladder control.  Neurologic: Per the Physical Exam.  Musculoskeletal:  Denies back pain, muscle pain, hip pain.  Psychiatric: Denies depressive symptoms. Estephania anxiety.    Objective:   Last documented vital signs 2024 blood pressure 148/85, pulse 74, respirations 19, oxygen saturation is 99%.    BMI 24.63 kg/m²    Physical Exam:  General:  Well developed, well nourished, and groomed individual in no acute distress.  HEENT: normocephalic  Neck: trach is midline, upon observation neck appears to be supple.  Cardiovascular: Unable to reliably assess due to telehealth visit  Respiratory: Upon observation equal chest expansion, nonlabored respirations  GI: Unable to assess due to telehealth visit  : Unable to assess due to telehealth visit  Psych/Mental Health:  Pleasant mood and affect        2023     2:01 PM 10/17/2022     9:46 AM 8/10/2022    12:41 PM   PHQ-9    Little interest or pleasure in doing things 0 0 0   Feeling down, depressed, or hopeless 0 0 0   PHQ-2 Score 0 0 0   PHQ-9 Total Score 0 0 0        NEUROLOGICAL EXAMINATION:     Mental Status: Is awake, alert, oriented x 3.  Speech is clear.  Speech pattern is fluent.  He follows simple one-step as well as more complex two-step commands.  He is a reliable historian, however when questioning him about current medications he is unable to give me a

## 2024-02-29 ENCOUNTER — TELEPHONE (OUTPATIENT)
Age: 59
End: 2024-02-29

## 2024-02-29 NOTE — ASSESSMENT & PLAN NOTE
Patient has a history of MI.  With his stroke in September he had a Holter monitor placed which showed heavy burden of SVE's including SVT, AT, and RT.  As well as a short run of nonsustained V.T. Patient previously followed by Dr. Rivero with cardiology     1.  Cardiology referral was placed, and patient had an appointment.  He was a no-show for that appointment.  He states his headache was too bad for him to go in.  2.  I encouraged him to reach back out to the cardiology office to see if they would reschedule his appointment.

## 2024-02-29 NOTE — ASSESSMENT & PLAN NOTE
Mr. Josue has a history of a right PCA stroke September 2023 status post thrombolytics.  He does have with residual decreased sensation left upper and left lower extremity and left homonymous hemianopsia. Patient was readmitted to [facility] December 6 with transient altered mental status and right upper extremity weakness and paresthesias.  The symptoms resolved within 30 minutes.  Patient states he has been compliant with his aspirin and Plavix, however he was not consistently taking his Lipitor and he continues to smoke.   - MRI scan of the brain shows right thalamic acute versus subacute lacunar infarct   - Lipid profile:      -12/7/2023: Total cholesterol 194, HDL 48, .8     -9/27/2023: Total cholesterol 156, HDL 49, LDL 80.8     It is unclear if the stroke in the right thalamic region is acute or subacute in nature, the right thalamic stroke would explain the decrease in sensation left upper extremity and left lower extremity, but would not explain the transient weakness and paresthesias of the right upper extremity.  Patient does have risk factors for stroke, his symptoms may have represented a left hemispheric TIA versus being radicular in nature.     1.  Continue aspirin 81, Plavix 75  2.  Continue taking 80 mg nightly, goal LDL below 70  3.  I emphasized the importance of tobacco cessation  4.  With hospitalization his blood pressures were noted to be: Systolic blood pressures 130s to 150s with diastolics 90s to 100s.  Goal blood pressures: Systolic less than 140, diastolic less than 80.  Patient states he has been taking off the Cozaar, but cannot tell me if it has been replaced with an alternative medication.  He states that blood pressures have been running low.  5.   MRA scan of the head showed an occluded right PCA as well as right ganglial capsular AVM with a nidus measuring 8 x 6 mm.  He underwent diagnostic cerebral angiogram, has been referred to [facility] neurosurgery.

## 2024-02-29 NOTE — ASSESSMENT & PLAN NOTE
Continues with chronic daily headache primarily posterior right occipital region radiating forward, or headache could begin bifrontal and radiate back.  Has migrainous type symptoms such as nausea (fortunately no photophobia or phonophobia, however he also has symptoms consistent with a tension type headache. Patient states he was told by Dr. Franklin that headache is due to his AVM.    Medications Tried and Failed either due to ineffective, side effects, or contraindicated:  Preventative:  -Depakote ER prescribed in October, doubled in November.  Currently on 1000mg with no relief  -Amitriptyline  prescribed in October, doubled in November.  Currently on 50mg with no relief - increased to 75mg this visit  -Gabapentin -ineffective     Abortive:  Nonsteroidals: Taking ibuprofen to abort the headache, no relief  Acetaminophen: No relief not currently taking  Oxycodone: Prescribed at discharge, does take the edge off -opioids been discontinued  Triptans - contraindicated due to his history of posterior circulation stroke as well as hypertension     1.  Depakote is not working, will wean off.  Will decrease to 500 mg nightly, we will most likely discontinue at next appointment  2.  As patient is not sleeping well, will increase amitriptyline to three 25mg tablets (75mg) nightly  3.  As patient's headaches may not be migrainous in nature but related to AVM, we will not pursue further preauthorization with [facility]  4.  Patient states gabapentin is also offering him no benefit, we will most likely wean this off as well  5.  Will use Fioricet, in the short-term, to take the edge off his headache, I would not anticipate more than 1 month supply. He can take one to two by mouth on onset of migraine may take and additional pill in 40 min if needed. No more than 3 in a day. Can't use more than 10 days a month or more than 2 days in a seven day cycle.  6.  It is concerning that I cannot get a clear picture of patient's medications

## 2024-02-29 NOTE — PATIENT INSTRUCTIONS
hours to process. We encourage you to use [employer] [facility] for your refills.     For controlled medications: Please allow up to 72 business hours to process. Certain medications may require you to  a written prescription at our office.    NO narcotic/controlled medications will be prescribed after 4pm Monday through Friday or on weekends    Form/Paperwork Completion:  We ask that you allow 7-14 business days. You may also download your forms to [employer] to have your doctor print off.

## 2024-03-06 ENCOUNTER — TELEPHONE (OUTPATIENT)
Age: 59
End: 2024-03-06

## 2024-03-06 NOTE — TELEPHONE ENCOUNTER
Patient called yelling about how we have yet to send images to [facility].  Patient confirmed that [facility] did receive our referral which was faxed on 2/20/2024.     I attempted to explain to him that our office does not have control over images being sent to [facility].  He continued to yell stating he feels like he is being shuffled around.     I spoke with Dr. Franklin.  Dr. Franklin is going to send an email to [facility] to see what he can do to help move things along.     I let patient know what Dr. Franklin is doing.   Patient expresses understanding but is still upset over the situation and his ongoing headaches.

## 2024-03-27 ENCOUNTER — TELEMEDICINE (OUTPATIENT)
Age: 59
End: 2024-03-27
Payer: MEDICAID

## 2024-03-27 DIAGNOSIS — I69.90 LATE EFFECTS OF CVA (CEREBROVASCULAR ACCIDENT): ICD-10-CM

## 2024-03-27 DIAGNOSIS — G43.019 INTRACTABLE MIGRAINE WITHOUT AURA AND WITHOUT STATUS MIGRAINOSUS: Primary | ICD-10-CM

## 2024-03-27 PROCEDURE — 99213 OFFICE O/P EST LOW 20 MIN: CPT | Performed by: NURSE PRACTITIONER

## 2024-03-27 NOTE — PROGRESS NOTES
stroke    VV 12/11/2023: Mr. Josue presents for follow-up today.  He was at his neurological baseline. with the chronic decrease sensation left upper and left lower extremity (secondary to his stroke in September).  He has had no recurrence of the right-sided weakness.      Cervical Radiculopathy:  VV 01/17/2024: Patient states the symptoms have resolved, and he has had no recurrence.    VV 12/27/2023: Cannot explain right upper extremity weakness and numbness by patient's right thalamic stroke.  Opine whether this relates to cervical radiculopathy.    Objective:     Allergies   Allergen Reactions    Penicillins Anaphylaxis     Other reaction(s): Unknown (comments)  Airway constriction      Promethazine Hives     Pt reports that he takes it now for nausea and have had no reaction      Current Outpatient Medications   Medication Instructions    albuterol (PROVENTIL) (2.5 MG/3ML) 0.083% nebulizer solution Inhalation, PRN    albuterol sulfate HFA (PROVENTIL;VENTOLIN;PROAIR) 108 (90 Base) MCG/ACT inhaler 1 puff, Inhalation, EVERY 6 HOURS PRN    amitriptyline (ELAVIL) 25 MG tablet Take three tablets at night one hour prior to bed time    aspirin 81 mg, Oral, EVERY EVENING    atorvastatin (LIPITOR) 80 mg, Oral, NIGHTLY    butalbital-acetaminophen-caffeine (FIORICET, ESGIC) -40 MG per tablet Take one to two by mouth on onset of migraine may take and additional pill in 40 min if needed. No more than 3 in a day.    clopidogrel (PLAVIX) 75 mg, Oral, DAILY    divalproex (DEPAKOTE ER) 500 mg, Oral, NIGHTLY    famotidine (PEPCID) 20 mg, Oral, 2 TIMES DAILY    traZODone (DESYREL) 150 MG tablet Oral        Pertinent Diagnostic and Laboratory Data  MRI Result (most recent):  MRI BRAIN WO CONTRAST 12/06/2023    Narrative  EXAM:  MRA HEAD WO CONTRAST, MRI BRAIN WO CONTRAST    INDICATION: 58-year-old male with possible stroke; evaluate for AVM.    COMPARISON:  12/6/2023 CTA/CT and 11/16/2023 MRI.    CONTRAST:

## 2024-03-28 NOTE — ASSESSMENT & PLAN NOTE
Mr. Josue has a history of a right PCA stroke September 2023 status post thrombolytics.  He does have with residual decreased sensation left upper and left lower extremity and left homonymous hemianopsia. Patient was readmitted to [facility] December 6, 2024 with transient altered mental status and right upper extremity weakness and paresthesias.  The symptoms resolved within 30 minutes. MRI scan of the brain showed right thalamic acute versus subacute lacunar infarct  It is unclear if the stroke in the right thalamic region is acute or subacute in nature, the right thalamic stroke would explain the decrease in sensation left upper extremity and left lower extremity, but would not explain the transient weakness and paresthesias of the right upper extremity.  Patient does have risk factors for stroke, his symptoms may have represented a left hemispheric TIA versus being radicular in nature.    Patient states he has been compliant with his aspirin and Plavix, however he was not consistently taking his Lipitor and he continues to smoke.    - Lipid profile:      -12/7/2023: Total cholesterol 194, HDL 48, .8     -9/27/2023: Total cholesterol 156, HDL 49, LDL 80.8     1.  MRA scan of the head showed an occluded right PCA as well as right ganglial capsular AVM with a nidus measuring 8 x 6 mm.  He has been following with [facility] neurosurgery.  He has been referred for gamma knife radiosurgery   2.  Continue aspirin 81, Plavix 75  3.  Continue taking 80 mg nightly, goal LDL below 70  4.  I emphasized the importance of tobacco cessation  
Patient's states the only thing that has helped with his headache is use of oxycodone, I told the patient I do not prescribe narcotics/opioids  7.  Patient stated that neurosurgery has told him his headache is likely due to his AVM.  I explained to him that this makes it more challenging to manage his headaches.  8.  Due to I am unsure what medications the patient is or is not taking, it makes managing his headaches challenging.  Going to schedule in person visit where his kendelle will be in attendance and we can review the med list together and come up with a plan for headache management.  He may need to be referred to a pain clinic.

## 2024-03-28 NOTE — PATIENT INSTRUCTIONS
Mr Josue,    Thank you for meeting me in in virtual clinic for your headaches.    I can hear your frustration with the chronic headaches you are having.    It is difficult for me to treat your headache for two reasons.  The first of which is, if the headache is structural in nature that makes it challenging treat.  The other is it is unclear to me what medications and doses you are actually taking.  When I ask about your medications you tell me you do not really know what you are taking as your fiancée manages your medications.    When I recommend is we have in person follow-up in the [facility] neurolog[facility] clinic.  I am going to have my office reach out to you and schedule an in person appointment.  If you miss the phone call from my office, you can call the office back at [phone] to schedule the appointment.    Please bring your medications and the doses that you are taking.    Also please let me know when you have updated your insurance information with and, at that time I can reorder the Nurtec and we can get that approved for you to see if that can offer you some relief in your headache.    If you have any questions or concerns do not hesitate to reach out to my office    Dodie Brewer DNP, [facility]    Office Policies    Phone calls/patient messages:  Please allow up to 24 hours for someone in the office to contact you about your call or message. Be mindful your provider may be out of the office or your message may require further review. We encourage you to use [employer] for your messages as this is a faster, more efficient way to communicate with our office    Medication Refills:  Prescription medications require up to 48 business hours to process. We encourage you to use [employer] for your refills.     For controlled medications: Please allow up to 72 business hours to process. Certain medications may require you to  a written prescription at our office.    NO narcotic/controlled medications will

## 2024-03-29 ENCOUNTER — TELEPHONE (OUTPATIENT)
Age: 59
End: 2024-03-29

## 2024-03-29 NOTE — TELEPHONE ENCOUNTER
Per NP Osman patient to be scheduled in person visit due to stroke and headache.  Called patient LM to schedule

## 2024-04-01 ENCOUNTER — TELEPHONE (OUTPATIENT)
Age: 59
End: 2024-04-01

## 2024-04-05 DIAGNOSIS — G43.019 INTRACTABLE MIGRAINE WITHOUT AURA AND WITHOUT STATUS MIGRAINOSUS: ICD-10-CM

## 2024-04-05 NOTE — TELEPHONE ENCOUNTER
Last seen 3/27/24  Last filled 2/28/24  Coming appt N/A  Asking for a 90 day supp. Script has been corrected to reflect this. Please sign if acceptable

## 2024-04-08 RX ORDER — AMITRIPTYLINE HYDROCHLORIDE 25 MG/1
TABLET, FILM COATED ORAL
Qty: 225 TABLET | Refills: 0 | Status: SHIPPED | OUTPATIENT
Start: 2024-04-08

## 2024-05-06 ENCOUNTER — TELEPHONE (OUTPATIENT)
Age: 59
End: 2024-05-06

## 2024-05-06 NOTE — TELEPHONE ENCOUNTER
Called patient and let him know that I was reaching out on behalf of JASMEET Brewer in hopes to get a virtual visit scheduled due to some results from JASMEET Sanchez from [facility] neurosurgery. He states that now is not a good time as he is on his way to get his surgery now. Advised pt to call us back as soon as he can so we can get something scheduled.

## 2024-05-06 NOTE — TELEPHONE ENCOUNTER
Spoke with JASMEET Smyth with [facility] Neurosurgery. She states pt is mutual pt and verified ID. States that patient had new imaging done that shows new subacute infarct. Patient was seen and he appears to not be any different from previous neuro assessment. He continues on aspirin. NP states they are going to fax over the MRI for review. Informed NP that pt does not currently have another appointment scheduled and documentation shows he planned on not coming back to be seen. Will notify JASMEET Brewer.

## 2024-05-21 ENCOUNTER — APPOINTMENT (OUTPATIENT)
Facility: HOSPITAL | Age: 59
End: 2024-05-21
Payer: MEDICAID

## 2024-05-21 ENCOUNTER — HOSPITAL ENCOUNTER (EMERGENCY)
Facility: HOSPITAL | Age: 59
Discharge: HOME | End: 2024-05-21
Attending: EMERGENCY MEDICINE
Payer: MEDICAID

## 2024-05-21 VITALS
WEIGHT: 200 LBS | TEMPERATURE: 98 F | BODY MASS INDEX: 25.67 KG/M2 | HEIGHT: 74 IN | HEART RATE: 77 BPM | RESPIRATION RATE: 18 BRPM | SYSTOLIC BLOOD PRESSURE: 156 MMHG | OXYGEN SATURATION: 100 % | DIASTOLIC BLOOD PRESSURE: 108 MMHG

## 2024-05-21 DIAGNOSIS — R51.9 NONINTRACTABLE HEADACHE, UNSPECIFIED CHRONICITY PATTERN, UNSPECIFIED HEADACHE TYPE: Primary | ICD-10-CM

## 2024-05-21 PROCEDURE — 99284 EMERGENCY DEPT VISIT MOD MDM: CPT

## 2024-05-21 PROCEDURE — 70450 CT HEAD/BRAIN W/O DYE: CPT

## 2024-05-21 RX ORDER — KETOROLAC TROMETHAMINE 10 MG/1
10 TABLET, FILM COATED ORAL EVERY 6 HOURS PRN
Qty: 20 TABLET | Refills: 0 | Status: SHIPPED | OUTPATIENT
Start: 2024-05-21 | End: 2024-05-31

## 2024-05-21 ASSESSMENT — PAIN - FUNCTIONAL ASSESSMENT: PAIN_FUNCTIONAL_ASSESSMENT: 0-10

## 2024-05-21 ASSESSMENT — PAIN SCALES - GENERAL
PAINLEVEL_OUTOF10: 0
PAINLEVEL_OUTOF10: 9

## 2024-05-21 ASSESSMENT — PAIN DESCRIPTION - LOCATION: LOCATION: HEAD

## 2024-05-21 NOTE — ED PROVIDER NOTES
[facility] EMERGENCY DEP  EMERGENCY DEPARTMENT ENCOUNTER      Patient Name: Joon Josue Jr.  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  Symptoms have been intermittent since surgery earlier this month.  He has spoken to his surgery team at [facility] regarding the symptoms and they told him if they get worse he should go to the emergency department.  Patient reports he is now out of Toradol and hydrocodone.          Nursing Notes were reviewed.    REVIEW OF SYSTEMS    (Not required)   Review of Systems    Except as noted above the remainder of the review of systems was reviewed and negative.     PAST MEDICAL HISTORY     Past Medical History:   Diagnosis Date    Acute MI, anterior wall (HCC) 01/09/2016    VF arrest/lytics/[facility]/cath/no PCI    Asthma     Cervical radiculopathy     Chronic obstructive pulmonary disease (HCC)     Chronic pain     Coronary heart disease     Diverticulosis     GERD (gastroesophageal reflux disease)     Hypertension     Pneumonia     PTSD (post-traumatic stress disorder)     Shingles     Tubular adenoma        SURGICAL HISTORY       Past Surgical History:   Procedure Laterality Date    KNEE ARTHROSCOPY Right     ORTHOPEDIC SURGERY      removed a mass from right palm oct 13 2017right wrist fxr repair 2016 dec    ROTATOR CUFF REPAIR         CURRENT MEDICATIONS       Discharge Medication List as of 5/21/2024  9:18 AM        CONTINUE these medications which have NOT CHANGED    Details   amitriptyline (ELAVIL) 25 MG tablet Take three tablets at night one hour

## 2024-05-21 NOTE — ED TRIAGE NOTES
Pt arrived with c/o headaches and on and off facial swelling since his gammaknife surgery on 5/7. He called his surgeon but cannot be seen for the next 6 months. He states his doctor told him if his symptoms got any worse to go to the ED. He states he has also run out of his Hydrocodone and toradol

## 2024-06-06 ENCOUNTER — APPOINTMENT (OUTPATIENT)
Facility: HOSPITAL | Age: 59
End: 2024-06-06
Payer: MEDICAID

## 2024-06-06 ENCOUNTER — HOSPITAL ENCOUNTER (EMERGENCY)
Facility: HOSPITAL | Age: 59
Discharge: HOME | End: 2024-06-06
Attending: EMERGENCY MEDICINE
Payer: MEDICAID

## 2024-06-06 VITALS
BODY MASS INDEX: 25.67 KG/M2 | HEART RATE: 83 BPM | HEIGHT: 74 IN | DIASTOLIC BLOOD PRESSURE: 90 MMHG | OXYGEN SATURATION: 98 % | SYSTOLIC BLOOD PRESSURE: 128 MMHG | WEIGHT: 200 LBS | TEMPERATURE: 98.3 F | RESPIRATION RATE: 18 BRPM

## 2024-06-06 DIAGNOSIS — L03.213 PRESEPTAL CELLULITIS: ICD-10-CM

## 2024-06-06 DIAGNOSIS — R51.9 NONINTRACTABLE HEADACHE, UNSPECIFIED CHRONICITY PATTERN, UNSPECIFIED HEADACHE TYPE: Primary | ICD-10-CM

## 2024-06-06 LAB
ANION GAP SERPL CALC-SCNC: 11 MMOL/L (ref 5–15)
BUN SERPL-MCNC: 10 MG/DL (ref 6–20)
BUN/CREAT SERPL: 6 (ref 12–20)
CALCIUM SERPL-MCNC: 8.7 MG/DL (ref 8.5–10.1)
CHLORIDE SERPL-SCNC: 103 MMOL/L (ref 97–108)
CO2 SERPL-SCNC: 25 MMOL/L (ref 21–32)
CREAT SERPL-MCNC: 1.55 MG/DL (ref 0.7–1.3)
GLUCOSE SERPL-MCNC: 111 MG/DL (ref 65–100)
POTASSIUM SERPL-SCNC: 3.7 MMOL/L (ref 3.5–5.1)
SODIUM SERPL-SCNC: 139 MMOL/L (ref 136–145)

## 2024-06-06 PROCEDURE — 96374 THER/PROPH/DIAG INJ IV PUSH: CPT

## 2024-06-06 PROCEDURE — 70450 CT HEAD/BRAIN W/O DYE: CPT

## 2024-06-06 PROCEDURE — 6360000002 HC RX W HCPCS: Performed by: EMERGENCY MEDICINE

## 2024-06-06 PROCEDURE — 99284 EMERGENCY DEPT VISIT MOD MDM: CPT

## 2024-06-06 PROCEDURE — 36415 COLL VENOUS BLD VENIPUNCTURE: CPT

## 2024-06-06 PROCEDURE — 80048 BASIC METABOLIC PNL TOTAL CA: CPT

## 2024-06-06 PROCEDURE — 96375 TX/PRO/DX INJ NEW DRUG ADDON: CPT

## 2024-06-06 RX ORDER — DIPHENHYDRAMINE HYDROCHLORIDE 50 MG/ML
25 INJECTION INTRAMUSCULAR; INTRAVENOUS
Status: COMPLETED | OUTPATIENT
Start: 2024-06-06 | End: 2024-06-06

## 2024-06-06 RX ORDER — METOCLOPRAMIDE HYDROCHLORIDE 5 MG/ML
10 INJECTION INTRAMUSCULAR; INTRAVENOUS ONCE
Status: COMPLETED | OUTPATIENT
Start: 2024-06-06 | End: 2024-06-06

## 2024-06-06 RX ORDER — OXYCODONE HYDROCHLORIDE 5 MG/1
5 TABLET ORAL EVERY 8 HOURS PRN
Qty: 10 TABLET | Refills: 0 | Status: SHIPPED | OUTPATIENT
Start: 2024-06-06 | End: 2024-06-09

## 2024-06-06 RX ORDER — LEVOFLOXACIN 500 MG/1
500 TABLET, FILM COATED ORAL DAILY
Qty: 5 TABLET | Refills: 0 | Status: SHIPPED | OUTPATIENT
Start: 2024-06-06 | End: 2024-06-11

## 2024-06-06 RX ADMIN — METOCLOPRAMIDE 10 MG: 5 INJECTION, SOLUTION INTRAMUSCULAR; INTRAVENOUS at 17:19

## 2024-06-06 RX ADMIN — DIPHENHYDRAMINE HYDROCHLORIDE 25 MG: 50 INJECTION INTRAMUSCULAR; INTRAVENOUS at 17:19

## 2024-06-06 ASSESSMENT — LIFESTYLE VARIABLES
HOW OFTEN DO YOU HAVE A DRINK CONTAINING ALCOHOL: NEVER
HOW MANY STANDARD DRINKS CONTAINING ALCOHOL DO YOU HAVE ON A TYPICAL DAY: PATIENT DOES NOT DRINK

## 2024-06-06 ASSESSMENT — PAIN SCALES - GENERAL
PAINLEVEL_OUTOF10: 8
PAINLEVEL_OUTOF10: 8

## 2024-06-06 ASSESSMENT — PAIN DESCRIPTION - LOCATION: LOCATION: HEAD

## 2024-06-06 ASSESSMENT — PAIN - FUNCTIONAL ASSESSMENT
PAIN_FUNCTIONAL_ASSESSMENT: 0-10
PAIN_FUNCTIONAL_ASSESSMENT: 0-10

## 2024-06-06 ASSESSMENT — PAIN DESCRIPTION - ORIENTATION: ORIENTATION: LEFT

## 2024-06-06 ASSESSMENT — PAIN DESCRIPTION - DESCRIPTORS: DESCRIPTORS: SHARP;PRESSURE

## 2024-06-06 NOTE — ED TRIAGE NOTES
Pt arrived with complaint of a headache.  Pt reports he has gamma knife procedure 3 weeks ago and not has a severe headache with pain behind his left eye and swelling  to his face. Pt reports he has a follow up appointment on 6/13/2024 at [facility].  Pt is awake and alert X 4,  pt speaking in full complete sentences  NAD.  Pt educated on ER flow

## 2024-06-06 NOTE — ED PROVIDER NOTES
EMERGENCY DEPARTMENT HISTORY AND PHYSICAL EXAM      Date: 6/6/2024  Patient Name: Joon Josue Jr.    History of Presenting Illness     Chief Complaint   Patient presents with    Headache     S/p gamma knife 3 weeks ago       History Provided By: Patient    HPI: Joon Josue Jr., 58 y.o. male with PMHx as noted below presents the emergency department chief complaint of headache.  Patient reports that he is not having persistent intermittent headaches since his recent procedure.  Patient states that headaches have been progressively getting worse prompting him to come to the ED today for evaluation.  Also reports some left-sided facial swelling/pain around his left eye.  He denies any pain in the eye itself and has had no acute visual changes.  Denies any new focal neurologic deficits.    Pt denies any other alleviating or exacerbating factors. Additionally, pt specifically denies any recent fever, chills,nausea, vomiting, abdominal pain, CP, SOB, lightheadedness, dizziness, vertigo or disequilibrium, acute numbness, acute unilateral weakness, BLE swelling, heart palpitations, urinary sxs, diarrhea,  cough, or congestion.    PCP: Gilbert South MD    No current facility-administered medications for this encounter.     Current Outpatient Medications   Medication Sig Dispense Refill    ketorolac (TORADOL) 10 MG tablet Take 1 tablet by mouth every 6 hours as needed for Pain 20 tablet 0    amitriptyline (ELAVIL) 25 MG tablet Take three tablets at night one hour prior to bed time 225 tablet 0    divalproex (DEPAKOTE ER) 500 MG extended release tablet Take 1 tablet by mouth nightly 30 tablet 5    butalbital-acetaminophen-caffeine (FIORICET, ESGIC) -40 MG per tablet Take one to two by mouth on onset of migraine may take and additional pill in 40 min if needed. No more than 3 in a day. 30 tablet 0    famotidine (PEPCID) 20 MG tablet Take 1 tablet by mouth 2 times daily 60 tablet 1    clopidogrel (PLAVIX) 75 MG

## 2024-06-25 ENCOUNTER — OFFICE VISIT (OUTPATIENT)
Facility: CLINIC | Age: 59
End: 2024-06-25
Payer: MEDICAID

## 2024-06-25 VITALS
TEMPERATURE: 98 F | SYSTOLIC BLOOD PRESSURE: 138 MMHG | DIASTOLIC BLOOD PRESSURE: 85 MMHG | RESPIRATION RATE: 18 BRPM | HEART RATE: 89 BPM | WEIGHT: 201 LBS | HEIGHT: 74 IN | BODY MASS INDEX: 25.8 KG/M2 | OXYGEN SATURATION: 98 %

## 2024-06-25 DIAGNOSIS — E78.00 HYPERCHOLESTEROLEMIA: ICD-10-CM

## 2024-06-25 DIAGNOSIS — I69.90 LATE EFFECTS OF CVA (CEREBROVASCULAR ACCIDENT): ICD-10-CM

## 2024-06-25 DIAGNOSIS — J43.2 CENTRILOBULAR EMPHYSEMA (HCC): ICD-10-CM

## 2024-06-25 DIAGNOSIS — G43.019 INTRACTABLE MIGRAINE WITHOUT AURA AND WITHOUT STATUS MIGRAINOSUS: Primary | ICD-10-CM

## 2024-06-25 DIAGNOSIS — I63.9 ACUTE STROKE DUE TO ISCHEMIA (HCC): ICD-10-CM

## 2024-06-25 PROCEDURE — 99203 OFFICE O/P NEW LOW 30 MIN: CPT | Performed by: FAMILY MEDICINE

## 2024-06-25 PROCEDURE — 3079F DIAST BP 80-89 MM HG: CPT | Performed by: FAMILY MEDICINE

## 2024-06-25 PROCEDURE — 3075F SYST BP GE 130 - 139MM HG: CPT | Performed by: FAMILY MEDICINE

## 2024-06-25 RX ORDER — ALBUTEROL SULFATE 90 UG/1
1 AEROSOL, METERED RESPIRATORY (INHALATION) EVERY 6 HOURS PRN
Qty: 18 G | Refills: 5 | Status: SHIPPED | OUTPATIENT
Start: 2024-06-25

## 2024-06-25 RX ORDER — TRAZODONE HYDROCHLORIDE 150 MG/1
300 TABLET ORAL NIGHTLY
Qty: 60 TABLET | Refills: 5 | Status: SHIPPED | OUTPATIENT
Start: 2024-06-25

## 2024-06-25 RX ORDER — VARENICLINE TARTRATE 1 MG/1
1 TABLET, FILM COATED ORAL 2 TIMES DAILY
Qty: 60 TABLET | Refills: 1 | Status: SHIPPED | OUTPATIENT
Start: 2024-06-25

## 2024-06-25 RX ORDER — VARENICLINE TARTRATE 0.5 MG/1
.5-1 TABLET, FILM COATED ORAL SEE ADMIN INSTRUCTIONS
Qty: 57 TABLET | Refills: 0 | Status: SHIPPED | OUTPATIENT
Start: 2024-06-25

## 2024-06-25 SDOH — ECONOMIC STABILITY: HOUSING INSECURITY
IN THE LAST 12 MONTHS, WAS THERE A TIME WHEN YOU DID NOT HAVE A STEADY PLACE TO SLEEP OR SLEPT IN A SHELTER (INCLUDING NOW)?: NO

## 2024-06-25 SDOH — ECONOMIC STABILITY: FOOD INSECURITY: WITHIN THE PAST 12 MONTHS, YOU WORRIED THAT YOUR FOOD WOULD RUN OUT BEFORE YOU GOT MONEY TO BUY MORE.: NEVER TRUE

## 2024-06-25 SDOH — ECONOMIC STABILITY: FOOD INSECURITY: WITHIN THE PAST 12 MONTHS, THE FOOD YOU BOUGHT JUST DIDN'T LAST AND YOU DIDN'T HAVE MONEY TO GET MORE.: NEVER TRUE

## 2024-06-25 ASSESSMENT — ANXIETY QUESTIONNAIRES
3. WORRYING TOO MUCH ABOUT DIFFERENT THINGS: MORE THAN HALF THE DAYS
GAD7 TOTAL SCORE: 13
7. FEELING AFRAID AS IF SOMETHING AWFUL MIGHT HAPPEN: MORE THAN HALF THE DAYS
IF YOU CHECKED OFF ANY PROBLEMS ON THIS QUESTIONNAIRE, HOW DIFFICULT HAVE THESE PROBLEMS MADE IT FOR YOU TO DO YOUR WORK, TAKE CARE OF THINGS AT HOME, OR GET ALONG WITH OTHER PEOPLE: SOMEWHAT DIFFICULT
7. FEELING AFRAID AS IF SOMETHING AWFUL MIGHT HAPPEN: MORE THAN HALF THE DAYS
5. BEING SO RESTLESS THAT IT IS HARD TO SIT STILL: MORE THAN HALF THE DAYS
1. FEELING NERVOUS, ANXIOUS, OR ON EDGE: MORE THAN HALF THE DAYS
5. BEING SO RESTLESS THAT IT IS HARD TO SIT STILL: SEVERAL DAYS
6. BECOMING EASILY ANNOYED OR IRRITABLE: SEVERAL DAYS
1. FEELING NERVOUS, ANXIOUS, OR ON EDGE: MORE THAN HALF THE DAYS
4. TROUBLE RELAXING: MORE THAN HALF THE DAYS
2. NOT BEING ABLE TO STOP OR CONTROL WORRYING: MORE THAN HALF THE DAYS
3. WORRYING TOO MUCH ABOUT DIFFERENT THINGS: MORE THAN HALF THE DAYS
4. TROUBLE RELAXING: SEVERAL DAYS
GAD7 TOTAL SCORE: 11
2. NOT BEING ABLE TO STOP OR CONTROL WORRYING: MORE THAN HALF THE DAYS
6. BECOMING EASILY ANNOYED OR IRRITABLE: SEVERAL DAYS

## 2024-06-25 ASSESSMENT — PATIENT HEALTH QUESTIONNAIRE - PHQ9
2. FEELING DOWN, DEPRESSED OR HOPELESS: SEVERAL DAYS
SUM OF ALL RESPONSES TO PHQ QUESTIONS 1-9: 2
SUM OF ALL RESPONSES TO PHQ QUESTIONS 1-9: 2
1. LITTLE INTEREST OR PLEASURE IN DOING THINGS: SEVERAL DAYS
SUM OF ALL RESPONSES TO PHQ QUESTIONS 1-9: 2
SUM OF ALL RESPONSES TO PHQ9 QUESTIONS 1 & 2: 2
SUM OF ALL RESPONSES TO PHQ QUESTIONS 1-9: 2

## 2024-06-25 NOTE — PROGRESS NOTES
Chief Complaint   Patient presents with    Establish Care     Patient has not been out of the country in (14 months), NO diarrhea, NO cough, NO chest conjestion, NO temp.  Pt has not been around anyone with these symptoms.     Health Maintenance reviewed.    I have reviewed the patient's medical history in detail and updated the computerized patient record.    \"Have you been to the ER, urgent care clinic since your last visit? No  Hospitalized since your last visit?\"    no    “Have you seen or consulted any other health care providers outside of [facility] since your last visit?”    no                                         
concerns.    The patient has a history of four cerebrovascular accidents since 09/2023, with the most recent episode occurring in 05/2023, a week prior to his brain surgery. His medical history includes a brain surgery on 05/07/2023. Prior to his surgery, his primary care physician was Dr. South in [place], but due to dissatisfaction with his care, he discontinued his Plavix and aspirin regimen on 05/01/2023. His antihypertensive medication was discontinued due to fluctuations in his blood pressure, ranging from 74 to 180 systolic. He also has a blocked carotid artery on the right side of his neck. His current medication regimen includes Trazodone 300 mg at night, Phenergan, and famotidine. He discontinued Ketorolac due to its ineffectiveness, Depakote, Amitriptyline, Lipitor, and Fioricet. He consumes approximately 10 to 12 different medications daily. He was previously under the care of Dr. Girard, a nurse practitioner, who prescribed gabapentin and Neurontin. He is currently under the care of a neurologist at [facility] and is scheduled to see a stroke specialist on 06/16/2023 and a pain management specialist at the end of 06/2023. His surgical history includes right leg knee replacement, left elbow replacement, right shoulder surgery, and right hand surgeries for Dupuytren's contracture and trigger finger releases. He has four degenerative discs in his lower back, but has not undergone surgery. He suffered a myocardial infarction on 01/10/2016, which resulted in a clot blockage in the LAD region of his heart, which was coated for 13 minutes. He is currently on disability. His first stroke occurred in his right occipital lobe.    The patient experiences severe headaches, which prevents him from resting. He was prescribed hydrocodone and oxycodone during his hospital ER visits, which have been effective. He reports that Fioricet and Toradol nasal spray have not provided relief. He has been on oxycodone intermittently

## 2024-06-29 ENCOUNTER — TELEPHONE (OUTPATIENT)
Facility: CLINIC | Age: 59
End: 2024-06-29

## 2024-06-29 NOTE — TELEPHONE ENCOUNTER
Son called me, patient continues to have difficulty with falling with continual debilitating headaches.  Has appointment with pain management coming up and has seen neurology.  Discussed with him the difficulties with trying to determine what is going on with his father.  Will try and contact neurology see what can be done since he continues to fall and injures himself.  Tried to assure him that we are trying to figure out what to do but he was dissatisfied.

## 2024-07-01 ENCOUNTER — TELEPHONE (OUTPATIENT)
Facility: CLINIC | Age: 59
End: 2024-07-01

## 2024-07-02 ENCOUNTER — TELEPHONE (OUTPATIENT)
Age: 59
End: 2024-07-02

## 2024-07-02 NOTE — TELEPHONE ENCOUNTER
Received refill request from [facility] for Amitriptyline which we show was stopped..  Verified patient with two patient identifiers.    Spoke with pt.    Verified patient with two patient identifiers.    Confirmed with pt he has stopped the Amitriptyline, states it was not working.    Faxed denial for refill back to [facility], advising pt has stopped the Amitriptyline.    Patient verbalized understanding.    Confirmation received.

## 2024-07-15 RX ORDER — VARENICLINE TARTRATE 0.5 MG/1
TABLET, FILM COATED ORAL
Qty: 57 TABLET | Refills: 0 | Status: SHIPPED | OUTPATIENT
Start: 2024-07-15

## 2024-07-17 ENCOUNTER — TELEPHONE (OUTPATIENT)
Facility: CLINIC | Age: 59
End: 2024-07-17

## 2024-07-17 RX ORDER — VARENICLINE TARTRATE 1 MG/1
1 TABLET, FILM COATED ORAL 2 TIMES DAILY
Qty: 180 TABLET | Refills: 1 | Status: SHIPPED | OUTPATIENT
Start: 2024-07-17

## 2024-07-17 NOTE — TELEPHONE ENCOUNTER
Pt is calling to see if Dr Umaña received the notes from his hospital stay in [place] from yesterday?

## 2024-07-18 ENCOUNTER — TELEPHONE (OUTPATIENT)
Facility: CLINIC | Age: 59
End: 2024-07-18

## 2024-07-22 ENCOUNTER — TELEPHONE (OUTPATIENT)
Facility: CLINIC | Age: 59
End: 2024-07-22

## 2024-07-23 ENCOUNTER — TELEPHONE (OUTPATIENT)
Facility: CLINIC | Age: 59
End: 2024-07-23

## 2024-07-23 DIAGNOSIS — G44.021 INTRACTABLE CHRONIC CLUSTER HEADACHE: Primary | ICD-10-CM

## 2024-07-23 RX ORDER — KETOROLAC TROMETHAMINE 10 MG/1
10 TABLET, FILM COATED ORAL EVERY 6 HOURS PRN
Qty: 120 TABLET | Refills: 1 | Status: SHIPPED | OUTPATIENT
Start: 2024-07-23 | End: 2025-07-23

## 2024-07-23 NOTE — TELEPHONE ENCOUNTER
Called him, reviewed notes but I will not prescribe oxycodone for his headaches but I will refill his Toradol.  Patient is frustrated that he is in pain which I understand but I do not believe oxycodone is the correct treatment for his headaches.  He can keep his follow-up with me to discuss this further, alternatives to narcotics for headaches also recommended by the neurologist.

## 2024-07-23 NOTE — TELEPHONE ENCOUNTER
Called patient and [facility]  Gave to Dr. Umaña to prove pt is on oxycodone and toradol.  Please send in to [facility] in [place], [place]

## 2024-07-25 ENCOUNTER — HOSPITAL ENCOUNTER (EMERGENCY)
Facility: HOSPITAL | Age: 59
Discharge: LEFT BEFORE DISCHARGE | End: 2024-07-25
Attending: EMERGENCY MEDICINE
Payer: MEDICAID

## 2024-07-25 ENCOUNTER — TELEMEDICINE (OUTPATIENT)
Facility: CLINIC | Age: 59
End: 2024-07-25

## 2024-07-25 VITALS
RESPIRATION RATE: 16 BRPM | SYSTOLIC BLOOD PRESSURE: 119 MMHG | WEIGHT: 200 LBS | HEIGHT: 74 IN | DIASTOLIC BLOOD PRESSURE: 94 MMHG | TEMPERATURE: 98 F | BODY MASS INDEX: 25.67 KG/M2 | HEART RATE: 86 BPM | OXYGEN SATURATION: 100 %

## 2024-07-25 DIAGNOSIS — R51.9 INTRACTABLE HEADACHE, UNSPECIFIED CHRONICITY PATTERN, UNSPECIFIED HEADACHE TYPE: Primary | ICD-10-CM

## 2024-07-25 DIAGNOSIS — G44.001 INTRACTABLE CLUSTER HEADACHE SYNDROME, UNSPECIFIED CHRONICITY PATTERN: Primary | ICD-10-CM

## 2024-07-25 PROCEDURE — 99282 EMERGENCY DEPT VISIT SF MDM: CPT

## 2024-07-25 RX ORDER — OXYCODONE HYDROCHLORIDE 5 MG/1
5 TABLET ORAL
Status: DISCONTINUED | OUTPATIENT
Start: 2024-07-25 | End: 2024-07-25

## 2024-07-25 NOTE — ED NOTES
Pt found walking out ED door to EMS bay. Asked pt if he had been discharged by the MD and pt stated \"I'm done\" and walked out the EMS bay door.

## 2024-07-25 NOTE — ED PROVIDER NOTES
Past Surgical History:  Past Surgical History:   Procedure Laterality Date    JOINT REPLACEMENT Right     KNEE ARTHROSCOPY Right     ORTHODONTIC TREATMENT Left     elbow trauma    ORTHOPEDIC SURGERY      removed a mass from right palm oct 13 2017right wrist fxr repair 2016 dec    ORTHOPEDIC SURGERY Right     hand trigger contractures trauma    ROTATOR CUFF REPAIR         Family History:  Family History   Problem Relation Age of Onset    Cancer Father        Social History:  Social History     Tobacco Use    Smoking status: Every Day     Current packs/day: 1.25     Average packs/day: 1.3 packs/day for 41.6 years (52.0 ttl pk-yrs)     Types: Cigarettes     Start date: 01/1983    Smokeless tobacco: Never   Vaping Use    Vaping Use: Never used   Substance Use Topics    Alcohol use: Yes     Alcohol/week: 1.0 standard drink of alcohol    Drug use: No       Allergies:  Allergies   Allergen Reactions    Penicillins Anaphylaxis     Other reaction(s): Unknown (comments)  Airway constriction      Promethazine Hives     Pt reports that he takes it now for nausea and have had no reaction       CURRENT MEDICATIONS      Previous Medications    ALBUTEROL (PROVENTIL) (2.5 MG/3ML) 0.083% NEBULIZER SOLUTION    Inhale into the lungs as needed    ALBUTEROL SULFATE HFA (PROVENTIL;VENTOLIN;PROAIR) 108 (90 BASE) MCG/ACT INHALER    Inhale 1 puff into the lungs every 6 hours as needed for Wheezing    FAMOTIDINE (PEPCID) 20 MG TABLET    Take 1 tablet by mouth 2 times daily    IPRATROPIUM (ATROVENT HFA) 17 MCG/ACT INHALER    Inhale 1 puff into the lungs 3 times daily    KETOROLAC (TORADOL) 10 MG TABLET    Take 1 tablet by mouth every 6 hours as needed for Pain    TRAZODONE (DESYREL) 150 MG TABLET    Take 2 tablets by mouth nightly    VARENICLINE (CHANTIX) 0.5 MG TABLET    TAKE 0.5MG BY MOUTH DAILY FOR 3 DAYS FOLLOWED BY 0.5MG TWICE DAILY FOR 4 DAYS FOLLOWED BY 1MG TWICE DAILY    VARENICLINE (CHANTIX) 1 MG TABLET    TAKE 1 TABLET BY

## 2024-07-25 NOTE — ED TRIAGE NOTES
Patient presents to the ED with a complaints of dizziness and headache that started 4 days ago when he ran out of medications.  Complains of blurry vision and nausea.  Complains of headache in the back of his head that radiates to the front of his head.  Per the patient he has had 4 CVA's since September.  Alert and oriented

## 2024-07-25 NOTE — PROGRESS NOTES
Joon Josue Jr. (:  1965) is a 58 y.o. male, Established patient, here for evaluation of the following chief complaint(s): HA  Fall (ER FU this AM/Pt blacked out and fell on his hip/)         Assessment & Plan  1. Cluster headaches.  The decision has been made. I will not prescribe oxycodone.  He is not interested in any other possible treatments for his headaches.    Results    1. Intractable cluster headache syndrome, unspecified chronicity pattern          Diagnosis Orders   1. Intractable cluster headache syndrome, unspecified chronicity pattern          No orders of the defined types were placed in this encounter.    Current Outpatient Medications   Medication Sig Dispense Refill    ketorolac (TORADOL) 10 MG tablet Take 1 tablet by mouth every 6 hours as needed for Pain 120 tablet 1    varenicline (CHANTIX) 1 MG tablet TAKE 1 TABLET BY MOUTH TWICE A  tablet 1    varenicline (CHANTIX) 0.5 MG tablet TAKE 0.5MG BY MOUTH DAILY FOR 3 DAYS FOLLOWED BY 0.5MG TWICE DAILY FOR 4 DAYS FOLLOWED BY 1MG TWICE DAILY 57 tablet 0    ipratropium (ATROVENT HFA) 17 MCG/ACT inhaler Inhale 1 puff into the lungs 3 times daily 1 each 5    albuterol sulfate HFA (PROVENTIL;VENTOLIN;PROAIR) 108 (90 Base) MCG/ACT inhaler Inhale 1 puff into the lungs every 6 hours as needed for Wheezing 18 g 5    traZODone (DESYREL) 150 MG tablet Take 2 tablets by mouth nightly 60 tablet 5    famotidine (PEPCID) 20 MG tablet Take 1 tablet by mouth 2 times daily 60 tablet 1    albuterol (PROVENTIL) (2.5 MG/3ML) 0.083% nebulizer solution Inhale into the lungs as needed       No current facility-administered medications for this visit.     No follow-ups on file.      Subjective   History of Present Illness  The patient is a 58-year-old male who presents for evaluation of multiple medical concerns.    The patient reports a history of four strokes since 2023, characterized by severe headaches. He sought emergency care today due to an

## 2024-07-25 NOTE — ED NOTES
Pt noted to be walking out of the ER with his mother.  Pt stated he was done and left the department .  Pt with a steady gait

## 2024-08-08 ENCOUNTER — HOSPITAL ENCOUNTER (EMERGENCY)
Facility: HOSPITAL | Age: 59
Discharge: HOME | End: 2024-08-08
Attending: EMERGENCY MEDICINE
Payer: MEDICAID

## 2024-08-08 ENCOUNTER — APPOINTMENT (OUTPATIENT)
Facility: HOSPITAL | Age: 59
End: 2024-08-08
Payer: MEDICAID

## 2024-08-08 VITALS
HEIGHT: 74 IN | RESPIRATION RATE: 20 BRPM | SYSTOLIC BLOOD PRESSURE: 139 MMHG | TEMPERATURE: 97.7 F | WEIGHT: 207 LBS | HEART RATE: 68 BPM | OXYGEN SATURATION: 100 % | BODY MASS INDEX: 26.56 KG/M2 | DIASTOLIC BLOOD PRESSURE: 88 MMHG

## 2024-08-08 DIAGNOSIS — R51.9 CHRONIC NONINTRACTABLE HEADACHE, UNSPECIFIED HEADACHE TYPE: Primary | ICD-10-CM

## 2024-08-08 DIAGNOSIS — G89.29 CHRONIC NONINTRACTABLE HEADACHE, UNSPECIFIED HEADACHE TYPE: Primary | ICD-10-CM

## 2024-08-08 LAB
ALBUMIN SERPL-MCNC: 3.4 G/DL (ref 3.5–5)
ALBUMIN/GLOB SERPL: 1.1 (ref 1.1–2.2)
ALP SERPL-CCNC: 67 U/L (ref 45–117)
ALT SERPL-CCNC: 33 U/L (ref 12–78)
ANION GAP SERPL CALC-SCNC: 11 MMOL/L (ref 5–15)
AST SERPL-CCNC: 28 U/L (ref 15–37)
BASOPHILS # BLD: 0 K/UL (ref 0–0.1)
BASOPHILS NFR BLD: 1 % (ref 0–1)
BILIRUB SERPL-MCNC: 0.5 MG/DL (ref 0.2–1)
BUN SERPL-MCNC: 13 MG/DL (ref 6–20)
BUN/CREAT SERPL: 10 (ref 12–20)
CALCIUM SERPL-MCNC: 8.9 MG/DL (ref 8.5–10.1)
CHLORIDE SERPL-SCNC: 105 MMOL/L (ref 97–108)
CO2 SERPL-SCNC: 23 MMOL/L (ref 21–32)
CREAT SERPL-MCNC: 1.28 MG/DL (ref 0.7–1.3)
DIFFERENTIAL METHOD BLD: ABNORMAL
EOSINOPHIL # BLD: 0.3 K/UL (ref 0–0.4)
EOSINOPHIL NFR BLD: 4 % (ref 0–7)
ERYTHROCYTE [DISTWIDTH] IN BLOOD BY AUTOMATED COUNT: 12.1 % (ref 11.5–14.5)
GLOBULIN SER CALC-MCNC: 3.1 G/DL (ref 2–4)
GLUCOSE SERPL-MCNC: 85 MG/DL (ref 65–100)
HCT VFR BLD AUTO: 39.5 % (ref 36.6–50.3)
HGB BLD-MCNC: 13.3 G/DL (ref 12.1–17)
IMM GRANULOCYTES # BLD AUTO: 0 K/UL (ref 0–0.04)
IMM GRANULOCYTES NFR BLD AUTO: 1 % (ref 0–0.5)
LYMPHOCYTES # BLD: 2.3 K/UL (ref 0.8–3.5)
LYMPHOCYTES NFR BLD: 36 % (ref 12–49)
MCH RBC QN AUTO: 32 PG (ref 26–34)
MCHC RBC AUTO-ENTMCNC: 33.7 G/DL (ref 30–36.5)
MCV RBC AUTO: 95 FL (ref 80–99)
MONOCYTES # BLD: 0.5 K/UL (ref 0–1)
MONOCYTES NFR BLD: 8 % (ref 5–13)
NEUTS SEG # BLD: 3.4 K/UL (ref 1.8–8)
NEUTS SEG NFR BLD: 50 % (ref 32–75)
NRBC # BLD: 0 K/UL (ref 0–0.01)
NRBC BLD-RTO: 0 PER 100 WBC
PLATELET # BLD AUTO: 210 K/UL (ref 150–400)
PMV BLD AUTO: 10.4 FL (ref 8.9–12.9)
POTASSIUM SERPL-SCNC: 4.2 MMOL/L (ref 3.5–5.1)
PROT SERPL-MCNC: 6.5 G/DL (ref 6.4–8.2)
RBC # BLD AUTO: 4.16 M/UL (ref 4.1–5.7)
SODIUM SERPL-SCNC: 139 MMOL/L (ref 136–145)
WBC # BLD AUTO: 6.6 K/UL (ref 4.1–11.1)

## 2024-08-08 PROCEDURE — 96374 THER/PROPH/DIAG INJ IV PUSH: CPT

## 2024-08-08 PROCEDURE — 96375 TX/PRO/DX INJ NEW DRUG ADDON: CPT

## 2024-08-08 PROCEDURE — 6370000000 HC RX 637 (ALT 250 FOR IP): Performed by: EMERGENCY MEDICINE

## 2024-08-08 PROCEDURE — 80053 COMPREHEN METABOLIC PANEL: CPT

## 2024-08-08 PROCEDURE — 70450 CT HEAD/BRAIN W/O DYE: CPT

## 2024-08-08 PROCEDURE — 6360000004 HC RX CONTRAST MEDICATION: Performed by: EMERGENCY MEDICINE

## 2024-08-08 PROCEDURE — 99285 EMERGENCY DEPT VISIT HI MDM: CPT

## 2024-08-08 PROCEDURE — 36415 COLL VENOUS BLD VENIPUNCTURE: CPT

## 2024-08-08 PROCEDURE — 85025 COMPLETE CBC W/AUTO DIFF WBC: CPT

## 2024-08-08 PROCEDURE — 70496 CT ANGIOGRAPHY HEAD: CPT

## 2024-08-08 PROCEDURE — 2580000003 HC RX 258: Performed by: EMERGENCY MEDICINE

## 2024-08-08 PROCEDURE — 6360000002 HC RX W HCPCS: Performed by: EMERGENCY MEDICINE

## 2024-08-08 RX ORDER — KETOROLAC TROMETHAMINE 15 MG/ML
15 INJECTION, SOLUTION INTRAMUSCULAR; INTRAVENOUS
Status: COMPLETED | OUTPATIENT
Start: 2024-08-08 | End: 2024-08-08

## 2024-08-08 RX ORDER — MORPHINE SULFATE 4 MG/ML
4 INJECTION, SOLUTION INTRAMUSCULAR; INTRAVENOUS
Status: COMPLETED | OUTPATIENT
Start: 2024-08-08 | End: 2024-08-08

## 2024-08-08 RX ORDER — OXYCODONE HYDROCHLORIDE AND ACETAMINOPHEN 5; 325 MG/1; MG/1
2 TABLET ORAL
Status: COMPLETED | OUTPATIENT
Start: 2024-08-08 | End: 2024-08-08

## 2024-08-08 RX ORDER — 0.9 % SODIUM CHLORIDE 0.9 %
1000 INTRAVENOUS SOLUTION INTRAVENOUS ONCE
Status: COMPLETED | OUTPATIENT
Start: 2024-08-08 | End: 2024-08-08

## 2024-08-08 RX ORDER — OXYCODONE HYDROCHLORIDE AND ACETAMINOPHEN 5; 325 MG/1; MG/1
1 TABLET ORAL EVERY 6 HOURS PRN
Qty: 12 TABLET | Refills: 0 | Status: SHIPPED | OUTPATIENT
Start: 2024-08-08 | End: 2024-08-11

## 2024-08-08 RX ADMIN — MORPHINE SULFATE 4 MG: 4 INJECTION, SOLUTION INTRAMUSCULAR; INTRAVENOUS at 15:29

## 2024-08-08 RX ADMIN — OXYCODONE HYDROCHLORIDE AND ACETAMINOPHEN 2 TABLET: 5; 325 TABLET ORAL at 13:25

## 2024-08-08 RX ADMIN — IOPAMIDOL 100 ML: 755 INJECTION, SOLUTION INTRAVENOUS at 14:29

## 2024-08-08 RX ADMIN — SODIUM CHLORIDE 1000 ML: 9 INJECTION, SOLUTION INTRAVENOUS at 13:28

## 2024-08-08 RX ADMIN — KETOROLAC TROMETHAMINE 15 MG: 15 INJECTION, SOLUTION INTRAMUSCULAR; INTRAVENOUS at 15:30

## 2024-08-08 ASSESSMENT — ENCOUNTER SYMPTOMS
COUGH: 0
SORE THROAT: 0
DIARRHEA: 0
SHORTNESS OF BREATH: 0
ABDOMINAL PAIN: 0
EYE REDNESS: 0
NAUSEA: 0
VOMITING: 0

## 2024-08-08 ASSESSMENT — PAIN SCALES - GENERAL
PAINLEVEL_OUTOF10: 10
PAINLEVEL_OUTOF10: 9
PAINLEVEL_OUTOF10: 9
PAINLEVEL_OUTOF10: 10

## 2024-08-08 ASSESSMENT — PAIN - FUNCTIONAL ASSESSMENT: PAIN_FUNCTIONAL_ASSESSMENT: 0-10

## 2024-08-08 NOTE — ED TRIAGE NOTES
Pt reports severe HA and dizziness with periods of \"blacking out\" since Friday. Has had a pmhx of Stroke as well as brain sx.

## 2024-08-08 NOTE — ED PROVIDER NOTES
to ED arrival but his tingling has since resolved and his blood pressures are improved on ED arrival; 144/100. His symptoms will be treated to see how he responds. His physical exam is unremarkable and he is neurologically intact and hemodynamically stable .\"    ED Course:   Initial assessment performed. The patients presenting problems have been discussed, and they are in agreement with the care plan formulated and outlined with them.  I have encouraged them to ask questions as they arise throughout their visit.\"        ED Course as of 08/08/24 1527   Thu Aug 08, 2024   1328 Old records were reviewed.  Patient has had 5 ER visits for headaches since May 21, 2024. [CH]      ED Course User Index  [CH] Vito Aguero MD         PROGRESS NOTE    Pt reevaluated.  Pain improved.  CT head without any acute findings.  CTA head with stable AVM.  No evidence of ICH.  Reviewed results with patient and family.  Reassured patient.  Will discharge with short course of oxycodone.  Reviewed with patient that we would not be able to refill this in the ED and that he needed to see his neurologist or his PCP for continued pain management.  No focal neurodeficits.  Longstanding persistent headaches.  Written by Vito Aguero MD     Progress note:    Pt noted to be feeling better and ready for discharge. Updated pt and/or family on all final lab and/or  imaging findings.  Will follow up as instructed. All questions have been answered, pt voiced understanding and agreement with plan.           Specific return precautions provided as well as instructions to return to the ED should sx worsen at any time. Vital signs stable for discharge.     I have also put together some discharge instructions for them that include: 1) educational information regarding their diagnosis, 2) how to care for their diagnosis at home, as well a 3) list of reasons why they would want to return to the ED prior to their follow-up appointment, should their

## 2024-10-25 ENCOUNTER — TRANSCRIBE ORDERS (OUTPATIENT)
Facility: HOSPITAL | Age: 59
End: 2024-10-25

## 2024-10-25 DIAGNOSIS — Z86.73 HISTORY OF CVA (CEREBROVASCULAR ACCIDENT): ICD-10-CM

## 2024-10-25 DIAGNOSIS — I1A.0 RESISTANT HYPERTENSION: Primary | ICD-10-CM

## 2024-12-11 ENCOUNTER — TRANSCRIBE ORDERS (OUTPATIENT)
Facility: HOSPITAL | Age: 59
End: 2024-12-11

## 2024-12-11 ENCOUNTER — HOSPITAL ENCOUNTER (OUTPATIENT)
Facility: HOSPITAL | Age: 59
Discharge: HOME | End: 2024-12-14
Payer: MEDICAID

## 2024-12-11 DIAGNOSIS — R06.02 SHORTNESS OF BREATH: Primary | ICD-10-CM

## 2024-12-11 DIAGNOSIS — R06.02 SHORTNESS OF BREATH: ICD-10-CM

## 2024-12-11 PROCEDURE — 71046 X-RAY EXAM CHEST 2 VIEWS: CPT

## 2024-12-12 ENCOUNTER — APPOINTMENT (OUTPATIENT)
Facility: HOSPITAL | Age: 59
End: 2024-12-12
Payer: MEDICAID

## 2024-12-12 ENCOUNTER — HOSPITAL ENCOUNTER (EMERGENCY)
Facility: HOSPITAL | Age: 59
Discharge: HOME | End: 2024-12-12
Attending: EMERGENCY MEDICINE
Payer: MEDICAID

## 2024-12-12 VITALS
BODY MASS INDEX: 26.69 KG/M2 | HEART RATE: 82 BPM | TEMPERATURE: 98.5 F | OXYGEN SATURATION: 98 % | DIASTOLIC BLOOD PRESSURE: 89 MMHG | WEIGHT: 208 LBS | HEIGHT: 74 IN | RESPIRATION RATE: 20 BRPM | SYSTOLIC BLOOD PRESSURE: 125 MMHG

## 2024-12-12 DIAGNOSIS — R05.1 ACUTE COUGH: ICD-10-CM

## 2024-12-12 DIAGNOSIS — J44.1 COPD EXACERBATION (HCC): Primary | ICD-10-CM

## 2024-12-12 DIAGNOSIS — R07.89 CHEST WALL PAIN: ICD-10-CM

## 2024-12-12 LAB
ALBUMIN SERPL-MCNC: 3.8 G/DL (ref 3.5–5)
ALBUMIN/GLOB SERPL: 1.2 (ref 1.1–2.2)
ALP SERPL-CCNC: 66 U/L (ref 45–117)
ALT SERPL-CCNC: 24 U/L (ref 12–78)
ANION GAP SERPL CALC-SCNC: 12 MMOL/L (ref 2–12)
AST SERPL-CCNC: 15 U/L (ref 15–37)
BASOPHILS # BLD: 0 K/UL (ref 0–0.1)
BASOPHILS NFR BLD: 1 % (ref 0–1)
BILIRUB SERPL-MCNC: 0.5 MG/DL (ref 0.2–1)
BUN SERPL-MCNC: 14 MG/DL (ref 6–20)
BUN/CREAT SERPL: 9 (ref 12–20)
CALCIUM SERPL-MCNC: 9.2 MG/DL (ref 8.5–10.1)
CHLORIDE SERPL-SCNC: 104 MMOL/L (ref 97–108)
CO2 SERPL-SCNC: 25 MMOL/L (ref 21–32)
CREAT SERPL-MCNC: 1.6 MG/DL (ref 0.7–1.3)
D DIMER PPP FEU-MCNC: 0.27 MG/L FEU (ref 0–0.65)
DIFFERENTIAL METHOD BLD: ABNORMAL
EKG ATRIAL RATE: 72 BPM
EKG DIAGNOSIS: NORMAL
EKG P AXIS: 41 DEGREES
EKG P-R INTERVAL: 144 MS
EKG Q-T INTERVAL: 410 MS
EKG QRS DURATION: 74 MS
EKG QTC CALCULATION (BAZETT): 448 MS
EKG R AXIS: 3 DEGREES
EKG T AXIS: 11 DEGREES
EKG VENTRICULAR RATE: 72 BPM
EOSINOPHIL # BLD: 0.1 K/UL (ref 0–0.4)
EOSINOPHIL NFR BLD: 2 % (ref 0–7)
ERYTHROCYTE [DISTWIDTH] IN BLOOD BY AUTOMATED COUNT: 11.9 % (ref 11.5–14.5)
FLUAV RNA SPEC QL NAA+PROBE: NOT DETECTED
FLUBV RNA SPEC QL NAA+PROBE: NOT DETECTED
GLOBULIN SER CALC-MCNC: 3.1 G/DL (ref 2–4)
GLUCOSE SERPL-MCNC: 100 MG/DL (ref 65–100)
HCT VFR BLD AUTO: 42.2 % (ref 36.6–50.3)
HGB BLD-MCNC: 14.6 G/DL (ref 12.1–17)
IMM GRANULOCYTES # BLD AUTO: 0 K/UL (ref 0–0.04)
IMM GRANULOCYTES NFR BLD AUTO: 1 % (ref 0–0.5)
LYMPHOCYTES # BLD: 2.6 K/UL (ref 0.8–3.5)
LYMPHOCYTES NFR BLD: 41 % (ref 12–49)
MCH RBC QN AUTO: 32.5 PG (ref 26–34)
MCHC RBC AUTO-ENTMCNC: 34.6 G/DL (ref 30–36.5)
MCV RBC AUTO: 94 FL (ref 80–99)
MONOCYTES # BLD: 0.7 K/UL (ref 0–1)
MONOCYTES NFR BLD: 11 % (ref 5–13)
NEUTS SEG # BLD: 2.9 K/UL (ref 1.8–8)
NEUTS SEG NFR BLD: 44 % (ref 32–75)
NRBC # BLD: 0 K/UL (ref 0–0.01)
NRBC BLD-RTO: 0 PER 100 WBC
NT PRO BNP: 83 PG/ML (ref 0–125)
PLATELET # BLD AUTO: 210 K/UL (ref 150–400)
PMV BLD AUTO: 9.7 FL (ref 8.9–12.9)
POTASSIUM SERPL-SCNC: 4.1 MMOL/L (ref 3.5–5.1)
PROT SERPL-MCNC: 6.9 G/DL (ref 6.4–8.2)
RBC # BLD AUTO: 4.49 M/UL (ref 4.1–5.7)
SARS-COV-2 RNA RESP QL NAA+PROBE: NOT DETECTED
SODIUM SERPL-SCNC: 141 MMOL/L (ref 136–145)
SOURCE: NORMAL
TROPONIN I SERPL HS-MCNC: 10 NG/L (ref 0–76)
WBC # BLD AUTO: 6.4 K/UL (ref 4.1–11.1)

## 2024-12-12 PROCEDURE — 36415 COLL VENOUS BLD VENIPUNCTURE: CPT

## 2024-12-12 PROCEDURE — 94640 AIRWAY INHALATION TREATMENT: CPT

## 2024-12-12 PROCEDURE — 84484 ASSAY OF TROPONIN QUANT: CPT

## 2024-12-12 PROCEDURE — 96374 THER/PROPH/DIAG INJ IV PUSH: CPT

## 2024-12-12 PROCEDURE — 93005 ELECTROCARDIOGRAM TRACING: CPT | Performed by: EMERGENCY MEDICINE

## 2024-12-12 PROCEDURE — 6370000000 HC RX 637 (ALT 250 FOR IP): Performed by: EMERGENCY MEDICINE

## 2024-12-12 PROCEDURE — 85379 FIBRIN DEGRADATION QUANT: CPT

## 2024-12-12 PROCEDURE — 71275 CT ANGIOGRAPHY CHEST: CPT

## 2024-12-12 PROCEDURE — 80053 COMPREHEN METABOLIC PANEL: CPT

## 2024-12-12 PROCEDURE — 71045 X-RAY EXAM CHEST 1 VIEW: CPT

## 2024-12-12 PROCEDURE — 6360000004 HC RX CONTRAST MEDICATION: Performed by: EMERGENCY MEDICINE

## 2024-12-12 PROCEDURE — 87636 SARSCOV2 & INF A&B AMP PRB: CPT

## 2024-12-12 PROCEDURE — 99285 EMERGENCY DEPT VISIT HI MDM: CPT

## 2024-12-12 PROCEDURE — 83880 ASSAY OF NATRIURETIC PEPTIDE: CPT

## 2024-12-12 PROCEDURE — 6360000002 HC RX W HCPCS: Performed by: EMERGENCY MEDICINE

## 2024-12-12 PROCEDURE — 85025 COMPLETE CBC W/AUTO DIFF WBC: CPT

## 2024-12-12 RX ORDER — ALBUTEROL SULFATE 90 UG/1
2 INHALANT RESPIRATORY (INHALATION) 4 TIMES DAILY PRN
Qty: 18 G | Refills: 0 | Status: SHIPPED | OUTPATIENT
Start: 2024-12-12

## 2024-12-12 RX ORDER — IOPAMIDOL 755 MG/ML
100 INJECTION, SOLUTION INTRAVASCULAR
Status: COMPLETED | OUTPATIENT
Start: 2024-12-12 | End: 2024-12-12

## 2024-12-12 RX ORDER — HYDROCODONE BITARTRATE AND HOMATROPINE METHYLBROMIDE ORAL SOLUTION 5; 1.5 MG/5ML; MG/5ML
2.5 LIQUID ORAL 4 TIMES DAILY PRN
Qty: 30 ML | Refills: 0 | Status: SHIPPED | OUTPATIENT
Start: 2024-12-12 | End: 2024-12-15

## 2024-12-12 RX ORDER — BENZONATATE 100 MG/1
100 CAPSULE ORAL
Status: COMPLETED | OUTPATIENT
Start: 2024-12-12 | End: 2024-12-12

## 2024-12-12 RX ORDER — KETOROLAC TROMETHAMINE 15 MG/ML
15 INJECTION, SOLUTION INTRAMUSCULAR; INTRAVENOUS
Status: COMPLETED | OUTPATIENT
Start: 2024-12-12 | End: 2024-12-12

## 2024-12-12 RX ORDER — DOXYCYCLINE HYCLATE 100 MG
100 TABLET ORAL 2 TIMES DAILY
Qty: 14 TABLET | Refills: 0 | Status: SHIPPED | OUTPATIENT
Start: 2024-12-12 | End: 2024-12-19

## 2024-12-12 RX ORDER — PREDNISONE 10 MG/1
TABLET ORAL
Qty: 1 EACH | Refills: 0 | Status: SHIPPED | OUTPATIENT
Start: 2024-12-12

## 2024-12-12 RX ORDER — ACETAMINOPHEN 325 MG/1
650 TABLET ORAL
Status: COMPLETED | OUTPATIENT
Start: 2024-12-12 | End: 2024-12-12

## 2024-12-12 RX ORDER — IPRATROPIUM BROMIDE AND ALBUTEROL SULFATE 2.5; .5 MG/3ML; MG/3ML
1 SOLUTION RESPIRATORY (INHALATION)
Status: COMPLETED | OUTPATIENT
Start: 2024-12-12 | End: 2024-12-12

## 2024-12-12 RX ADMIN — IOPAMIDOL 100 ML: 755 INJECTION, SOLUTION INTRAVENOUS at 10:59

## 2024-12-12 RX ADMIN — BENZONATATE 100 MG: 100 CAPSULE ORAL at 10:24

## 2024-12-12 RX ADMIN — IPRATROPIUM BROMIDE AND ALBUTEROL SULFATE 1 DOSE: .5; 2.5 SOLUTION RESPIRATORY (INHALATION) at 09:32

## 2024-12-12 RX ADMIN — KETOROLAC TROMETHAMINE 15 MG: 15 INJECTION, SOLUTION INTRAMUSCULAR; INTRAVENOUS at 09:59

## 2024-12-12 RX ADMIN — ACETAMINOPHEN 650 MG: 325 TABLET ORAL at 10:24

## 2024-12-12 ASSESSMENT — PAIN - FUNCTIONAL ASSESSMENT
PAIN_FUNCTIONAL_ASSESSMENT: 0-10
PAIN_FUNCTIONAL_ASSESSMENT: PREVENTS OR INTERFERES SOME ACTIVE ACTIVITIES AND ADLS
PAIN_FUNCTIONAL_ASSESSMENT: 0-10

## 2024-12-12 ASSESSMENT — PAIN DESCRIPTION - LOCATION
LOCATION: GENERALIZED
LOCATION: HEAD

## 2024-12-12 ASSESSMENT — PAIN SCALES - GENERAL
PAINLEVEL_OUTOF10: 0
PAINLEVEL_OUTOF10: 6

## 2024-12-12 ASSESSMENT — PAIN DESCRIPTION - DESCRIPTORS
DESCRIPTORS: ACHING
DESCRIPTORS: ACHING

## 2024-12-12 NOTE — ED NOTES
Pt reports tussinex and tessalon perles has worked for him for his cough, I will update the provider  
Pt returned from ct scan at this time  
Resp notified of treatment  
Detail Level: Detailed
Quality 226: Preventive Care And Screening: Tobacco Use: Screening And Cessation Intervention: Patient screened for tobacco use and is an ex/non-smoker
Quality 431: Preventive Care And Screening: Unhealthy Alcohol Use - Screening: Patient not identified as an unhealthy alcohol user when screened for unhealthy alcohol use using a systematic screening method

## 2024-12-13 NOTE — ED PROVIDER NOTES
PROVENTIL;VENTOLIN;PROAIR  Inhale 1 puff into the lungs every 6 hours as needed for Wheezing  What changed: Another medication with the same name was added. Make sure you understand how and when to take each.     * albuterol sulfate  (90 Base) MCG/ACT inhaler  Commonly known as: Ventolin HFA  Inhale 2 puffs into the lungs 4 times daily as needed for Wheezing  What changed: You were already taking a medication with the same name, and this prescription was added. Make sure you understand how and when to take each.           * This list has 3 medication(s) that are the same as other medications prescribed for you. Read the directions carefully, and ask your doctor or other care provider to review them with you.                ASK your doctor about these medications      famotidine 20 MG tablet  Commonly known as: PEPCID  Take 1 tablet by mouth 2 times daily     ipratropium 17 MCG/ACT inhaler  Commonly known as: Atrovent HFA  Inhale 1 puff into the lungs 3 times daily     ketorolac 10 MG tablet  Commonly known as: TORADOL  Take 1 tablet by mouth every 6 hours as needed for Pain     traZODone 150 MG tablet  Commonly known as: DESYREL  Take 2 tablets by mouth nightly     * varenicline 0.5 MG tablet  Commonly known as: CHANTIX  TAKE 0.5MG BY MOUTH DAILY FOR 3 DAYS FOLLOWED BY 0.5MG TWICE DAILY FOR 4 DAYS FOLLOWED BY 1MG TWICE DAILY     * varenicline 1 MG tablet  Commonly known as: CHANTIX  TAKE 1 TABLET BY MOUTH TWICE A DAY           * This list has 2 medication(s) that are the same as other medications prescribed for you. Read the directions carefully, and ask your doctor or other care provider to review them with you.                   Where to Get Your Medications        These medications were sent to [facility] #[id number] [place], [place] - [address] [facility] - P [phone] - F [phone]  [address] [facility] [address]      Phone: [phone]   albuterol sulfate  (90

## 2024-12-19 ENCOUNTER — APPOINTMENT (OUTPATIENT)
Facility: HOSPITAL | Age: 59
End: 2024-12-19
Payer: MEDICAID

## 2024-12-19 ENCOUNTER — HOSPITAL ENCOUNTER (EMERGENCY)
Facility: HOSPITAL | Age: 59
Discharge: HOME | End: 2024-12-19
Attending: EMERGENCY MEDICINE
Payer: MEDICAID

## 2024-12-19 VITALS
BODY MASS INDEX: 26.95 KG/M2 | DIASTOLIC BLOOD PRESSURE: 91 MMHG | OXYGEN SATURATION: 100 % | SYSTOLIC BLOOD PRESSURE: 137 MMHG | TEMPERATURE: 98 F | HEIGHT: 74 IN | RESPIRATION RATE: 18 BRPM | WEIGHT: 210 LBS | HEART RATE: 87 BPM

## 2024-12-19 DIAGNOSIS — S09.90XA CLOSED HEAD INJURY, INITIAL ENCOUNTER: ICD-10-CM

## 2024-12-19 DIAGNOSIS — S16.1XXA STRAIN OF NECK MUSCLE, INITIAL ENCOUNTER: ICD-10-CM
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
## 2024-12-19 PROCEDURE — 70450 CT HEAD/BRAIN W/O DYE: CPT

## 2024-12-19 PROCEDURE — 99284 EMERGENCY DEPT VISIT MOD MDM: CPT

## 2024-12-19 PROCEDURE — 72125 CT NECK SPINE W/O DYE: CPT

## 2024-12-19 RX ORDER — OXYCODONE HYDROCHLORIDE 5 MG/1
5 TABLET ORAL EVERY 6 HOURS PRN
Qty: 12 TABLET | Refills: 0 | Status: SHIPPED | OUTPATIENT
Start: 2024-12-19 | End: 2024-12-22

## 2024-12-19 RX ORDER — METHOCARBAMOL 750 MG/1
750 TABLET, FILM COATED ORAL 3 TIMES DAILY PRN
Qty: 15 TABLET | Refills: 0 | Status: SHIPPED | OUTPATIENT
Start: 2024-12-19 | End: 2024-12-24

## 2024-12-19 ASSESSMENT — PAIN DESCRIPTION - DESCRIPTORS: DESCRIPTORS: TENDER

## 2024-12-19 ASSESSMENT — PAIN SCALES - GENERAL: PAINLEVEL_OUTOF10: 9

## 2024-12-19 ASSESSMENT — PAIN - FUNCTIONAL ASSESSMENT: PAIN_FUNCTIONAL_ASSESSMENT: 0-10

## 2024-12-19 ASSESSMENT — PAIN DESCRIPTION - LOCATION: LOCATION: NECK
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
## 2025-02-05 ENCOUNTER — APPOINTMENT (OUTPATIENT)
Facility: HOSPITAL | Age: 60
End: 2025-02-05
Payer: MEDICAID

## 2025-02-05 ENCOUNTER — HOSPITAL ENCOUNTER (EMERGENCY)
Facility: HOSPITAL | Age: 60
Discharge: HOME | End: 2025-02-05
Attending: EMERGENCY MEDICINE
Payer: MEDICAID

## 2025-02-05 VITALS
DIASTOLIC BLOOD PRESSURE: 99 MMHG | OXYGEN SATURATION: 100 % | HEIGHT: 74 IN | SYSTOLIC BLOOD PRESSURE: 188 MMHG | WEIGHT: 205 LBS | BODY MASS INDEX: 26.31 KG/M2 | RESPIRATION RATE: 16 BRPM | HEART RATE: 67 BPM | TEMPERATURE: 98.2 F

## 2025-02-05 DIAGNOSIS — R51.9 ACUTE NONINTRACTABLE HEADACHE, UNSPECIFIED HEADACHE TYPE: ICD-10-CM

## 2025-02-05 DIAGNOSIS — R53.1 GENERALIZED WEAKNESS: ICD-10-CM

## 2025-02-05 DIAGNOSIS — J06.9 ACUTE UPPER RESPIRATORY INFECTION: Primary | ICD-10-CM

## 2025-02-05 LAB
ALBUMIN SERPL-MCNC: 3.5 G/DL (ref 3.5–5)
ALBUMIN/GLOB SERPL: 1.1 (ref 1.1–2.2)
ALP SERPL-CCNC: 77 U/L (ref 45–117)
ALT SERPL-CCNC: 38 U/L (ref 12–78)
ANION GAP SERPL CALC-SCNC: 12 MMOL/L (ref 2–12)
AST SERPL-CCNC: 31 U/L (ref 15–37)
BASOPHILS # BLD: 0.03 K/UL (ref 0–0.1)
BASOPHILS NFR BLD: 0.6 % (ref 0–1)
BILIRUB SERPL-MCNC: 0.8 MG/DL (ref 0.2–1)
BUN SERPL-MCNC: 11 MG/DL (ref 6–20)
BUN/CREAT SERPL: 7 (ref 12–20)
CALCIUM SERPL-MCNC: 8.7 MG/DL (ref 8.5–10.1)
CHLORIDE SERPL-SCNC: 104 MMOL/L (ref 97–108)
CO2 SERPL-SCNC: 25 MMOL/L (ref 21–32)
CREAT SERPL-MCNC: 1.51 MG/DL (ref 0.7–1.3)
DIFFERENTIAL METHOD BLD: NORMAL
EOSINOPHIL # BLD: 0.27 K/UL (ref 0–0.4)
EOSINOPHIL NFR BLD: 5.1 % (ref 0–7)
ERYTHROCYTE [DISTWIDTH] IN BLOOD BY AUTOMATED COUNT: 11.7 % (ref 11.5–14.5)
FLUAV RNA SPEC QL NAA+PROBE: NOT DETECTED
FLUBV RNA SPEC QL NAA+PROBE: NOT DETECTED
GLOBULIN SER CALC-MCNC: 3.1 G/DL (ref 2–4)
GLUCOSE SERPL-MCNC: 106 MG/DL (ref 65–100)
HCT VFR BLD AUTO: 44.8 % (ref 36.6–50.3)
HGB BLD-MCNC: 15 G/DL (ref 12.1–17)
IMM GRANULOCYTES # BLD AUTO: 0.01 K/UL (ref 0–0.04)
IMM GRANULOCYTES NFR BLD AUTO: 0.2 % (ref 0–0.5)
LYMPHOCYTES # BLD: 2.12 K/UL (ref 0.8–3.5)
LYMPHOCYTES NFR BLD: 40.3 % (ref 12–49)
MAGNESIUM SERPL-MCNC: 1.7 MG/DL (ref 1.6–2.4)
MCH RBC QN AUTO: 32.3 PG (ref 26–34)
MCHC RBC AUTO-ENTMCNC: 33.5 G/DL (ref 30–36.5)
MCV RBC AUTO: 96.6 FL (ref 80–99)
MONOCYTES # BLD: 0.48 K/UL (ref 0–1)
MONOCYTES NFR BLD: 9.1 % (ref 5–13)
NEUTS SEG # BLD: 2.35 K/UL (ref 1.8–8)
NEUTS SEG NFR BLD: 44.7 % (ref 32–75)
NRBC # BLD: 0 K/UL (ref 0–0.01)
NRBC BLD-RTO: 0 PER 100 WBC
PLATELET # BLD AUTO: 251 K/UL (ref 150–400)
PMV BLD AUTO: 9.8 FL (ref 8.9–12.9)
POTASSIUM SERPL-SCNC: 3.8 MMOL/L (ref 3.5–5.1)
PROT SERPL-MCNC: 6.6 G/DL (ref 6.4–8.2)
RBC # BLD AUTO: 4.64 M/UL (ref 4.1–5.7)
SARS-COV-2 RNA RESP QL NAA+PROBE: NOT DETECTED
SODIUM SERPL-SCNC: 141 MMOL/L (ref 136–145)
SOURCE: NORMAL
TROPONIN I SERPL HS-MCNC: 5 NG/L (ref 0–76)
WBC # BLD AUTO: 5.3 K/UL (ref 4.1–11.1)

## 2025-02-05 PROCEDURE — 80053 COMPREHEN METABOLIC PANEL: CPT

## 2025-02-05 PROCEDURE — 2580000003 HC RX 258: Performed by: EMERGENCY MEDICINE

## 2025-02-05 PROCEDURE — 6370000000 HC RX 637 (ALT 250 FOR IP): Performed by: EMERGENCY MEDICINE

## 2025-02-05 PROCEDURE — 84484 ASSAY OF TROPONIN QUANT: CPT

## 2025-02-05 PROCEDURE — 87636 SARSCOV2 & INF A&B AMP PRB: CPT

## 2025-02-05 PROCEDURE — 6360000002 HC RX W HCPCS: Performed by: EMERGENCY MEDICINE

## 2025-02-05 PROCEDURE — 96375 TX/PRO/DX INJ NEW DRUG ADDON: CPT

## 2025-02-05 PROCEDURE — 85025 COMPLETE CBC W/AUTO DIFF WBC: CPT

## 2025-02-05 PROCEDURE — 96374 THER/PROPH/DIAG INJ IV PUSH: CPT

## 2025-02-05 PROCEDURE — 71045 X-RAY EXAM CHEST 1 VIEW: CPT

## 2025-02-05 PROCEDURE — 99285 EMERGENCY DEPT VISIT HI MDM: CPT

## 2025-02-05 PROCEDURE — 83735 ASSAY OF MAGNESIUM: CPT

## 2025-02-05 PROCEDURE — 93005 ELECTROCARDIOGRAM TRACING: CPT | Performed by: EMERGENCY MEDICINE

## 2025-02-05 PROCEDURE — 36415 COLL VENOUS BLD VENIPUNCTURE: CPT

## 2025-02-05 RX ORDER — BUTALBITAL, ACETAMINOPHEN AND CAFFEINE 50; 325; 40 MG/1; MG/1; MG/1
1 TABLET ORAL EVERY 6 HOURS PRN
Qty: 20 TABLET | Refills: 0 | Status: SHIPPED | OUTPATIENT
Start: 2025-02-05

## 2025-02-05 RX ORDER — METOCLOPRAMIDE HYDROCHLORIDE 5 MG/ML
10 INJECTION INTRAMUSCULAR; INTRAVENOUS ONCE
Status: COMPLETED | OUTPATIENT
Start: 2025-02-05 | End: 2025-02-05

## 2025-02-05 RX ORDER — 0.9 % SODIUM CHLORIDE 0.9 %
1000 INTRAVENOUS SOLUTION INTRAVENOUS ONCE
Status: COMPLETED | OUTPATIENT
Start: 2025-02-05 | End: 2025-02-05

## 2025-02-05 RX ORDER — KETOROLAC TROMETHAMINE 15 MG/ML
15 INJECTION, SOLUTION INTRAMUSCULAR; INTRAVENOUS
Status: COMPLETED | OUTPATIENT
Start: 2025-02-05 | End: 2025-02-05

## 2025-02-05 RX ORDER — DIPHENHYDRAMINE HYDROCHLORIDE 50 MG/ML
25 INJECTION INTRAMUSCULAR; INTRAVENOUS
Status: COMPLETED | OUTPATIENT
Start: 2025-02-05 | End: 2025-02-05

## 2025-02-05 RX ORDER — BUTALBITAL, ACETAMINOPHEN AND CAFFEINE 50; 325; 40 MG/1; MG/1; MG/1
1 TABLET ORAL
Status: COMPLETED | OUTPATIENT
Start: 2025-02-05 | End: 2025-02-05

## 2025-02-05 RX ADMIN — SODIUM CHLORIDE 1000 ML: 9 INJECTION, SOLUTION INTRAVENOUS at 15:09

## 2025-02-05 RX ADMIN — KETOROLAC TROMETHAMINE 15 MG: 15 INJECTION, SOLUTION INTRAMUSCULAR; INTRAVENOUS at 15:03

## 2025-02-05 RX ADMIN — DIPHENHYDRAMINE HYDROCHLORIDE 25 MG: 50 INJECTION INTRAMUSCULAR; INTRAVENOUS at 16:16

## 2025-02-05 RX ADMIN — BUTALBITAL, ACETAMINOPHEN AND CAFFEINE 1 TABLET: 325; 50; 40 TABLET ORAL at 15:06

## 2025-02-05 RX ADMIN — METOCLOPRAMIDE 10 MG: 5 INJECTION, SOLUTION INTRAMUSCULAR; INTRAVENOUS at 16:19

## 2025-02-05 ASSESSMENT — PAIN DESCRIPTION - LOCATION: LOCATION: HEAD

## 2025-02-05 ASSESSMENT — PAIN SCALES - GENERAL: PAINLEVEL_OUTOF10: 10

## 2025-02-05 ASSESSMENT — PAIN - FUNCTIONAL ASSESSMENT: PAIN_FUNCTIONAL_ASSESSMENT: NONE - DENIES PAIN

## 2025-02-05 NOTE — ED NOTES
Discharge instructions provided and reviewed with pt. Opportunity provided for discussion and pt has no further questions at this time.

## 2025-02-05 NOTE — ED NOTES
Pt has used call bell, would like sandwich, states he hasn't eaten all day and he doesn't like taking his medicines on an empty stomach. ED Provider informed, primary RN informed.

## 2025-02-05 NOTE — ED TRIAGE NOTES
Increasing SOB , headache, leg cramps and falls x 5 days with \"right lung pain \" , afebrile ,RA sats 100 , RR22

## 2025-02-05 NOTE — ED PROVIDER NOTES
[facility]  EMERGENCY DEPARTMENT ENCOUNTER       Pt Name: Joon Josue Jr.  MRN: [id number]  Birthdate 1965  Date of evaluation: 2/5/2025  Provider: Rosendo Turner DO   PCP: Corinne Ballesteros MD  Note Started: 6:29 PM EST 2/5/25     CHIEF COMPLAINT       Chief Complaint   Patient presents with    Shortness of Breath        HISTORY OF PRESENT ILLNESS: 1 or more elements      History From: Patient, History limited by: none     Joon Josue Jr. is a 59 y.o. male presents to the emergency department by private vehicle for evaluation of cough shortness of breath chest pain muscle cramps.       Please See MDM for Additional Details of the HPI/PMH  Nursing Notes were all reviewed and agreed with or any disagreements were addressed in the HPI.     REVIEW OF SYSTEMS        Positives and Pertinent negatives as per HPI.    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/09/2016    VF arrest/lytics/[facility]flight [facility]/cath/no PCI    Asthma     Cervical radiculopathy     Chronic obstructive pulmonary disease (HCC)     Chronic pain     Coronary heart disease     Diverticulosis     GERD (gastroesophageal reflux disease)     Hypertension     Pneumonia     PTSD (post-traumatic stress disorder)     Shingles     Tubular adenoma        Past Surgical History:  Past Surgical History:   Procedure Laterality Date    JOINT REPLACEMENT Right     KNEE ARTHROSCOPY Right     ORTHODONTIC TREATMENT Left     elbow trauma    ORTHOPEDIC SURGERY      removed a mass from right palm oct 13 2017right wrist fxr repair 2016 dec    ORTHOPEDIC SURGERY Right     hand trigger contractures trauma    ROTATOR CUFF REPAIR         Family History:  Family History   Problem Relation Age of Onset    Cancer Father        Social History:  Social History     Tobacco Use    Smoking status: Every Day     Current packs/day: 1.25     Average packs/day: 1.3 packs/day for 42.1

## 2025-02-08 LAB
EKG ATRIAL RATE: 75 BPM
EKG DIAGNOSIS: NORMAL
EKG P AXIS: 54 DEGREES
EKG P-R INTERVAL: 136 MS
EKG Q-T INTERVAL: 394 MS
EKG QRS DURATION: 88 MS
EKG QTC CALCULATION (BAZETT): 439 MS
EKG R AXIS: 29 DEGREES
EKG T AXIS: 34 DEGREES
EKG VENTRICULAR RATE: 75 BPM

## 2025-03-04 ENCOUNTER — OFFICE VISIT (OUTPATIENT)
Age: 60
End: 2025-03-04
Payer: MEDICAID

## 2025-03-04 VITALS
HEIGHT: 74 IN | DIASTOLIC BLOOD PRESSURE: 88 MMHG | HEART RATE: 76 BPM | BODY MASS INDEX: 25.33 KG/M2 | WEIGHT: 197.4 LBS | RESPIRATION RATE: 18 BRPM | SYSTOLIC BLOOD PRESSURE: 132 MMHG | OXYGEN SATURATION: 98 %

## 2025-03-04 DIAGNOSIS — Z12.5 SCREENING FOR PROSTATE CANCER: ICD-10-CM

## 2025-03-04 DIAGNOSIS — N18.31 STAGE 3A CHRONIC KIDNEY DISEASE (HCC): ICD-10-CM

## 2025-03-04 DIAGNOSIS — Z86.73 HISTORY OF MULTIPLE CEREBROVASCULAR ACCIDENTS (CVAS): ICD-10-CM

## 2025-03-04 DIAGNOSIS — R51.9 CHRONIC INTRACTABLE HEADACHE, UNSPECIFIED HEADACHE TYPE: ICD-10-CM

## 2025-03-04 DIAGNOSIS — J43.2 CENTRILOBULAR EMPHYSEMA (HCC): ICD-10-CM

## 2025-03-04 DIAGNOSIS — I21.3 ST ELEVATION MYOCARDIAL INFARCTION (STEMI), UNSPECIFIED ARTERY (HCC): ICD-10-CM

## 2025-03-04 DIAGNOSIS — G89.29 CHRONIC INTRACTABLE HEADACHE, UNSPECIFIED HEADACHE TYPE: ICD-10-CM

## 2025-03-04 DIAGNOSIS — R10.30 LOWER ABDOMINAL PAIN: ICD-10-CM

## 2025-03-04 DIAGNOSIS — Z87.19 HISTORY OF RECTAL BLEEDING: ICD-10-CM

## 2025-03-04 DIAGNOSIS — Z76.89 ENCOUNTER TO ESTABLISH CARE: Primary | ICD-10-CM

## 2025-03-04 PROCEDURE — 3075F SYST BP GE 130 - 139MM HG: CPT | Performed by: STUDENT IN AN ORGANIZED HEALTH CARE EDUCATION/TRAINING PROGRAM

## 2025-03-04 PROCEDURE — 3079F DIAST BP 80-89 MM HG: CPT | Performed by: STUDENT IN AN ORGANIZED HEALTH CARE EDUCATION/TRAINING PROGRAM

## 2025-03-04 PROCEDURE — 99204 OFFICE O/P NEW MOD 45 MIN: CPT | Performed by: STUDENT IN AN ORGANIZED HEALTH CARE EDUCATION/TRAINING PROGRAM

## 2025-03-04 RX ORDER — LOSARTAN POTASSIUM 100 MG/1
100 TABLET ORAL DAILY
COMMUNITY

## 2025-03-04 RX ORDER — BUPROPION HYDROCHLORIDE 150 MG/1
150 TABLET ORAL DAILY
COMMUNITY
Start: 2024-11-01

## 2025-03-04 RX ORDER — CLOPIDOGREL BISULFATE 75 MG/1
75 TABLET ORAL DAILY
COMMUNITY
Start: 2024-10-25 | End: 2025-04-23

## 2025-03-04 RX ORDER — TOPIRAMATE 100 MG/1
100 TABLET, FILM COATED ORAL DAILY
COMMUNITY

## 2025-03-04 RX ORDER — AMLODIPINE BESYLATE 10 MG/1
10 TABLET ORAL DAILY
COMMUNITY

## 2025-03-04 RX ORDER — POLYETHYLENE GLYCOL 3350 17 G/17G
17 POWDER, FOR SOLUTION ORAL PRN
COMMUNITY

## 2025-03-04 RX ORDER — MULTIVITAMIN WITH IRON
1 TABLET ORAL DAILY
COMMUNITY

## 2025-03-04 RX ORDER — CLONIDINE HYDROCHLORIDE 0.1 MG/1
0.1 TABLET ORAL 2 TIMES DAILY PRN
COMMUNITY

## 2025-03-04 SDOH — ECONOMIC STABILITY: FOOD INSECURITY: WITHIN THE PAST 12 MONTHS, YOU WORRIED THAT YOUR FOOD WOULD RUN OUT BEFORE YOU GOT MONEY TO BUY MORE.: NEVER TRUE

## 2025-03-04 SDOH — ECONOMIC STABILITY: FOOD INSECURITY: WITHIN THE PAST 12 MONTHS, THE FOOD YOU BOUGHT JUST DIDN'T LAST AND YOU DIDN'T HAVE MONEY TO GET MORE.: NEVER TRUE

## 2025-03-04 ASSESSMENT — PATIENT HEALTH QUESTIONNAIRE - PHQ9
SUM OF ALL RESPONSES TO PHQ QUESTIONS 1-9: 0
2. FEELING DOWN, DEPRESSED OR HOPELESS: NOT AT ALL
SUM OF ALL RESPONSES TO PHQ QUESTIONS 1-9: 0
1. LITTLE INTEREST OR PLEASURE IN DOING THINGS: NOT AT ALL

## 2025-03-04 NOTE — PROGRESS NOTES
Chief Complaint   Patient presents with    Establish Care    Back Pain       Vitals:    03/04/25 1011   BP: 132/88   Pulse: 76   Resp: 18   SpO2: 98%   \"Have you been to the ER, urgent care clinic since your last visit?  Hospitalized since your last visit?\"    NO    “Have you seen or consulted any other health care providers outside our system since your last visit?”    NO           
through [facility] to establish more locally    Centrilobular emphysema (HCC)  Recommended smoking cessation.  He is working on cutting back.  Continue albuterol.  Discussed lung cancer screening at follow-up visit    Stage 3a chronic kidney disease (HCC)  Has had fluctuating renal function.  Plan for repeat labs he would like to return fasting.  Discussed limiting NSAIDs increasing fluid intake.  Blood pressure well-controlled.  No medication changes today.         Verbal and written instructions (see AVS) provided.  Patient expresses understanding of diagnosis and treatment plan.      No follow-up provider specified.    This note was created with the assistance of voice to text technology. Please excuse any potential spelling or grammatical errors.    Debby Ocampo MD

## 2025-03-05 ENCOUNTER — TELEPHONE (OUTPATIENT)
Age: 60
End: 2025-03-05

## 2025-03-05 ENCOUNTER — CLINICAL DOCUMENTATION (OUTPATIENT)
Age: 60
End: 2025-03-05

## 2025-03-05 PROBLEM — Z86.73 HISTORY OF MULTIPLE CEREBROVASCULAR ACCIDENTS (CVAS): Status: ACTIVE | Noted: 2025-03-05

## 2025-03-05 RX ORDER — BUTALBITAL, ACETAMINOPHEN AND CAFFEINE 50; 325; 40 MG/1; MG/1; MG/1
1 TABLET ORAL EVERY 6 HOURS PRN
Qty: 10 TABLET | Refills: 0 | Status: SHIPPED | OUTPATIENT
Start: 2025-03-05

## 2025-03-05 NOTE — TELEPHONE ENCOUNTER
I had sent in a small supply of Fioricet for his headaches.  He was not sure which medications he was taking yesterday.  We tried to verify this with his pharmacy.  What does he need refills of can we confirm the medication and dosage?    If he is not able to get into see Dr. Donnelly for colonoscopy sooner he can try GI.  He was also given a referral to their office

## 2025-03-05 NOTE — TELEPHONE ENCOUNTER
Patient was seen yesterday and didn't get his medication refills. He also stated he can't see Dr. Donnelly until April

## 2025-03-07 NOTE — TELEPHONE ENCOUNTER
Pt stated he had refills on all of his medication and doesn't need anything at this time.  Also reminded him to make his appointments for his referrals. He stated that the medication is not helping for his headaches.

## 2025-03-12 ENCOUNTER — HOSPITAL ENCOUNTER (OUTPATIENT)
Facility: HOSPITAL | Age: 60
Discharge: HOME | End: 2025-03-15
Attending: STUDENT IN AN ORGANIZED HEALTH CARE EDUCATION/TRAINING PROGRAM
Payer: MEDICAID

## 2025-03-12 ENCOUNTER — CLINICAL DOCUMENTATION (OUTPATIENT)
Age: 60
End: 2025-03-12

## 2025-03-12 ENCOUNTER — HOSPITAL ENCOUNTER (OUTPATIENT)
Facility: HOSPITAL | Age: 60
Discharge: HOME | End: 2025-03-15
Payer: MEDICAID

## 2025-03-12 DIAGNOSIS — I65.23 INTRACRANIAL CAROTID STENOSIS, BILATERAL: ICD-10-CM

## 2025-03-12 DIAGNOSIS — I10 ESSENTIAL HYPERTENSION WITH GOAL BLOOD PRESSURE LESS THAN 130/80: ICD-10-CM

## 2025-03-12 DIAGNOSIS — R10.30 LOWER ABDOMINAL PAIN: ICD-10-CM

## 2025-03-12 DIAGNOSIS — E78.5 DYSLIPIDEMIA, GOAL LDL BELOW 70: ICD-10-CM

## 2025-03-12 DIAGNOSIS — I66.21 OCCLUSION AND STENOSIS OF RIGHT POSTERIOR CEREBRAL ARTERY: ICD-10-CM

## 2025-03-12 DIAGNOSIS — G43.019 INTRACTABLE MIGRAINE WITHOUT AURA AND WITHOUT STATUS MIGRAINOSUS: ICD-10-CM

## 2025-03-12 DIAGNOSIS — Z86.73 HISTORY OF MULTIPLE STROKES: ICD-10-CM

## 2025-03-12 LAB
ECHO AO ROOT DIAM: 2.8 CM
ECHO AV PEAK GRADIENT: 6 MMHG
ECHO AV PEAK VELOCITY: 1.2 M/S
ECHO EST RA PRESSURE: 3 MMHG
ECHO LA DIAMETER: 3.5 CM
ECHO LA TO AORTIC ROOT RATIO: 1.25
ECHO LA VOL A-L A2C: 40 ML (ref 18–58)
ECHO LA VOL A-L A4C: 43 ML (ref 18–58)
ECHO LA VOL MOD A2C: 37 ML (ref 18–58)
ECHO LA VOL MOD A4C: 41 ML (ref 18–58)
ECHO LA VOLUME AREA LENGTH: 44 ML
ECHO LV E' LATERAL VELOCITY: 11.6 CM/S
ECHO LV E' SEPTAL VELOCITY: 6.67 CM/S
ECHO LV EF PHYSICIAN: 65 %
ECHO LV FRACTIONAL SHORTENING: 29 % (ref 28–44)
ECHO LV INTERNAL DIMENSION DIASTOLIC: 4.1 CM (ref 4.2–5.9)
ECHO LV INTERNAL DIMENSION SYSTOLIC: 2.9 CM
ECHO LV IVSD: 1.1 CM (ref 0.6–1)
ECHO LV MASS 2D: 171.7 G (ref 88–224)
ECHO LV POSTERIOR WALL DIASTOLIC: 1.3 CM (ref 0.6–1)
ECHO LV RELATIVE WALL THICKNESS RATIO: 0.63
ECHO LVOT AREA: 4.5 CM2
ECHO LVOT DIAM: 2.4 CM
ECHO MV A VELOCITY: 0.88 M/S
ECHO MV E DECELERATION TIME (DT): 332.7 MS
ECHO MV E VELOCITY: 0.95 M/S
ECHO MV E/A RATIO: 1.08
ECHO MV E/E' LATERAL: 8.19
ECHO MV E/E' RATIO (AVERAGED): 11.22
ECHO MV E/E' SEPTAL: 14.24
ECHO PULMONARY ARTERY SYSTOLIC PRESSURE (PASP): 21 MMHG
ECHO PV MAX VELOCITY: 0.8 M/S
ECHO PV PEAK GRADIENT: 3 MMHG
ECHO RA AREA 4C: 13.7 CM2
ECHO RIGHT VENTRICULAR SYSTOLIC PRESSURE (RVSP): 16 MMHG
ECHO RV BASAL DIMENSION: 2.6 CM
ECHO RV TAPSE: 2.2 CM (ref 1.7–?)
ECHO TV REGURGITANT MAX VELOCITY: 1.79 M/S
ECHO TV REGURGITANT PEAK GRADIENT: 13 MMHG

## 2025-03-12 PROCEDURE — 93306 TTE W/DOPPLER COMPLETE: CPT

## 2025-03-12 PROCEDURE — 74176 CT ABD & PELVIS W/O CONTRAST: CPT

## 2025-03-13 ENCOUNTER — TELEPHONE (OUTPATIENT)
Age: 60
End: 2025-03-13

## 2025-03-13 ENCOUNTER — RESULTS FOLLOW-UP (OUTPATIENT)
Facility: HOSPITAL | Age: 60
End: 2025-03-13

## 2025-03-13 NOTE — TELEPHONE ENCOUNTER
PT called and said that his butalbital-acetaminophen-caffeine (FIORICET, ESGIC) -40 MG per tablet [[id number]] is not working for his headaches. He wants to know what else he can do. Next appt is 4/4.

## 2025-03-13 NOTE — TELEPHONE ENCOUNTER
I discussed the results with the patient.  See result note.  Normal EF and diastolic function.  CT without acute findings.  Discussed management of constipation.  Discussed updating lab as he has not had a recent renal function with JV on last labs and high NSAID u he states he will come in Monday or Tuesday for this.se.  May be able to update CT with contrast if stable to improved renal function.-GI have been hesitant to do this with his current renal function  He needs a number for the colonoscopy  .  He reports severe headaches and a syncopal event.  Recommended ER evaluation

## 2025-03-14 NOTE — TELEPHONE ENCOUNTER
I have reached out to his neurology office regarding medication management for his head pain. Awaiting call back

## 2025-03-14 NOTE — RESULT ENCOUNTER NOTE
Called pt and he states he is out of town and will call and make appointment as soon he gets back Tuesday or Wednesday.

## 2025-03-25 ENCOUNTER — OFFICE VISIT (OUTPATIENT)
Age: 60
End: 2025-03-25
Payer: MEDICAID

## 2025-03-25 VITALS
HEART RATE: 79 BPM | TEMPERATURE: 96.1 F | DIASTOLIC BLOOD PRESSURE: 81 MMHG | SYSTOLIC BLOOD PRESSURE: 127 MMHG | HEIGHT: 74 IN | RESPIRATION RATE: 18 BRPM | WEIGHT: 192 LBS | OXYGEN SATURATION: 79 % | BODY MASS INDEX: 24.64 KG/M2

## 2025-03-25 DIAGNOSIS — R10.31 RLQ ABDOMINAL PAIN: Primary | ICD-10-CM

## 2025-03-25 DIAGNOSIS — Z86.0101 PERSONAL HISTORY OF ADENOMATOUS AND SERRATED COLON POLYPS: ICD-10-CM

## 2025-03-25 DIAGNOSIS — Q28.2 CEREBRAL ARTERIOVENOUS MALFORMATION (AVM): ICD-10-CM

## 2025-03-25 DIAGNOSIS — R63.4 WEIGHT LOSS: ICD-10-CM

## 2025-03-25 DIAGNOSIS — Z95.818 IMPLANTABLE LOOP RECORDER PRESENT: ICD-10-CM

## 2025-03-25 PROBLEM — I25.2 HISTORY OF ST ELEVATION MYOCARDIAL INFARCTION (STEMI): Status: RESOLVED | Noted: 2019-10-15 | Resolved: 2025-03-25

## 2025-03-25 PROBLEM — Z72.0 TOBACCO ABUSE: Status: ACTIVE | Noted: 2022-09-30

## 2025-03-25 PROBLEM — I47.20 VT (VENTRICULAR TACHYCARDIA) (HCC): Status: RESOLVED | Noted: 2023-12-12 | Resolved: 2025-03-25

## 2025-03-25 PROBLEM — I47.19 PAT (PAROXYSMAL ATRIAL TACHYCARDIA): Status: ACTIVE | Noted: 2025-03-25

## 2025-03-25 PROCEDURE — 99204 OFFICE O/P NEW MOD 45 MIN: CPT | Performed by: SURGERY

## 2025-03-25 PROCEDURE — 3074F SYST BP LT 130 MM HG: CPT | Performed by: SURGERY

## 2025-03-25 PROCEDURE — 3079F DIAST BP 80-89 MM HG: CPT | Performed by: SURGERY

## 2025-03-25 ASSESSMENT — PATIENT HEALTH QUESTIONNAIRE - PHQ9
SUM OF ALL RESPONSES TO PHQ QUESTIONS 1-9: 0
1. LITTLE INTEREST OR PLEASURE IN DOING THINGS: NOT AT ALL
SUM OF ALL RESPONSES TO PHQ QUESTIONS 1-9: 0
SUM OF ALL RESPONSES TO PHQ QUESTIONS 1-9: 0
2. FEELING DOWN, DEPRESSED OR HOPELESS: NOT AT ALL
SUM OF ALL RESPONSES TO PHQ QUESTIONS 1-9: 0

## 2025-03-25 ASSESSMENT — ENCOUNTER SYMPTOMS: HEMATOCHEZIA: 1

## 2025-03-25 NOTE — ASSESSMENT & PLAN NOTE
It appears that he is scheduled to see cardiology locally.  Will request cardiac preoperative eval and permission to hold the Plavix and aspirin if possible.

## 2025-03-25 NOTE — PROGRESS NOTES
Joon Josue Jr. is a 59 y.o. male who presents today with the following:  Chief Complaint   Patient presents with    Rectal Bleeding     Rectal pain today       Rectal Bleeding         59-year-old male who presents as a referral from Dr. Ocampo for consideration of colonoscopy.  His last colonoscopy was in 2018 where currently at least a single adenomatous polyp was found.  He states over the last 5 to 6 months he is developed right lower quadrant abdominal pain that radiates towards his rectum.  He also apparently had significant constipation and this was associated with rectal bleeding.  He has been on MiraLAX as needed and the rectal bleeding has resolved but he still has abdominal pain that may occur 3-4 times a day in the last 30 minutes to an hour and a half.  He describes as a sharp stabbing pain.  He also believes he is lost 15 pounds over the last month.  He has no family history of colon cancer.  He now feels that his bowels are regular.  He denies any true narrowing of his stool.  He states that when he had some type of blood test when he was at [facility] they told him they had a concern about his colon.    He has been seen at [facility] for a cerebral AVM.  He has had 4 strokes in the past and after his last stroke in May they apparently identified the AVM and he had surgery on this.  He has had no stroke since then.  He has had a loop recorder placed at [facility] to check for A-fib and arrhythmia and has not established with cardiology yet in our region.  In reviewing the chart it appears that he may have an appointment with  on April 2. He also has a history of an MI with V-fib and arrest in 2016.  He states he did not have to have stenting or CABG.  He does not have a pacemaker in place.    He is on Plavix and aspirin and is not sure but believes his neurologist is the one who has of all these medications.    He has a 30-pack-year history of tobacco use where he was averaging a pack a day but states over

## 2025-03-25 NOTE — PROGRESS NOTES
ASSESSMENT and PLAN  1. Coronary artery disease involving native coronary artery of native heart without angina pectoris  s/p anterior STEMI and VF arrest 1/2016.  Nonobstructive disease at cath 1/10/2016.  No ischemia on stress Cardiolite 6/28/2016.  Normal LV function on echoes since 2016, most recent 3/12/2025.  Free of angina.  Continue current medications and risk factor modification efforts.    2. Implantable loop recorder present  s/p ILR implant 11/18/2024 due to history of multiple CVAs.  Continue regular follow-up with her [facility].    3. PAT (paroxysmal atrial tachycardia)  Prolonged atrial tachycardia runs documented on ILR interrogation.  Asymptomatic and not currently on a beta-blocker.    4. Primary hypertension  Well-controlled.  Continue current medications.    5. Hypercholesterolemia  On high-dose atorvastatin.  LDL goal <70.    6. Tobacco abuse  Continue efforts towards complete tobacco cessation.    7. PACs (premature atrial contractions)  8.3% PAC burden on 2-week monitor 9/2023 and PACs noted on ILR interrogations.  Asymptomatic.  No treatment necessary specifically for the PACs    8.  Preop cardiovascular exam  From a cardiac standpoint, he can proceed with colonoscopy as scheduled.  I would consider him to be low risk for perioperative cardiovascular complications for the procedure planned.  From a cardiac standpoint, the aspirin and clopidogrel can be withheld prior to the colonoscopy however, he is on chronic DAPT for neurologic reasons.  Therefore, I recommend checking with his PCP or neurologist for their recommendations regarding DAPT interruption for colonoscopy.       Follow-up in 1 year.  The patient has been instructed and agrees to call our office with any issues or other concerns related to their cardiac condition(s) and/or complaint(s).      CHIEF COMPLAINT  Here to reestablish cardiac care    HPI:    Joon Josue Jr. is a 59 y.o. male here to reestablish cardiac care.  He was

## 2025-03-25 NOTE — PROGRESS NOTES
Identified pt with two pt identifiers (name and ). Reviewed chart in preparation for visit and have obtained necessary documentation.    Joon Josue Jr. is a 59 y.o. male Rectal Bleeding (Rectal pain today)  .    Vitals:    25 1138   BP: 127/81   BP Site: Left Upper Arm   Patient Position: Sitting   BP Cuff Size: Medium Adult   Pulse: 79   Resp: 18   Temp: (!) 96.1 °F (35.6 °C)   TempSrc: Oral   SpO2: (!) 79%   Weight: 87.1 kg (192 lb)   Height: 1.88 m (6' 2\")          1. Have you been to the ER, urgent care clinic since your last visit?  Hospitalized since your last visit?  no     2. Have you seen or consulted any other health care providers outside of the [facility] since your last visit?  Include any pap smears or colon screening.  No    Temp is 98.7

## 2025-03-28 ENCOUNTER — PREP FOR PROCEDURE (OUTPATIENT)
Age: 60
End: 2025-03-28

## 2025-03-28 ENCOUNTER — TELEPHONE (OUTPATIENT)
Age: 60
End: 2025-03-28

## 2025-03-28 DIAGNOSIS — Z86.0101 PERSONAL HISTORY OF ADENOMATOUS AND SERRATED COLON POLYPS: ICD-10-CM

## 2025-03-28 DIAGNOSIS — R10.31 RLQ ABDOMINAL PAIN: ICD-10-CM

## 2025-03-28 DIAGNOSIS — R63.4 WEIGHT LOSS: ICD-10-CM

## 2025-03-28 NOTE — TELEPHONE ENCOUNTER
Called patient and gave him the surgery date, post-op date and went over his prep.  I also infomed him that we would need to get a clearance letter to see if it's okay for him to stop the Plavix and Aspirin 7 days prior to procedure.    SURGERY 05/08/2025    POST-OP 05/27/2025 @ 2:15

## 2025-04-01 ENCOUNTER — CLINICAL DOCUMENTATION (OUTPATIENT)
Age: 60
End: 2025-04-01

## 2025-04-01 NOTE — PROGRESS NOTES
Clearance Letter was faxed on:  04/01/2025    To:  Dr. Brewer    Telephone number:  [phone]    Fax number:  [phone]    Procedure:   Colonoscopy                               Procedure Date:  05*08*25

## 2025-04-02 ENCOUNTER — OFFICE VISIT (OUTPATIENT)
Age: 60
End: 2025-04-02
Payer: MEDICAID

## 2025-04-02 VITALS
TEMPERATURE: 98.4 F | DIASTOLIC BLOOD PRESSURE: 80 MMHG | OXYGEN SATURATION: 97 % | WEIGHT: 186 LBS | HEART RATE: 88 BPM | BODY MASS INDEX: 23.87 KG/M2 | SYSTOLIC BLOOD PRESSURE: 112 MMHG | HEIGHT: 74 IN

## 2025-04-02 DIAGNOSIS — I10 PRIMARY HYPERTENSION: ICD-10-CM

## 2025-04-02 DIAGNOSIS — I49.1 PAC (PREMATURE ATRIAL CONTRACTION): ICD-10-CM

## 2025-04-02 DIAGNOSIS — Z01.810 PREOP CARDIOVASCULAR EXAM: ICD-10-CM

## 2025-04-02 DIAGNOSIS — Z72.0 TOBACCO ABUSE: ICD-10-CM

## 2025-04-02 DIAGNOSIS — Z95.818 IMPLANTABLE LOOP RECORDER PRESENT: ICD-10-CM

## 2025-04-02 DIAGNOSIS — I47.19 PAT (PAROXYSMAL ATRIAL TACHYCARDIA): ICD-10-CM

## 2025-04-02 DIAGNOSIS — E78.00 HYPERCHOLESTEROLEMIA: ICD-10-CM

## 2025-04-02 DIAGNOSIS — I25.10 CORONARY ARTERY DISEASE INVOLVING NATIVE CORONARY ARTERY OF NATIVE HEART WITHOUT ANGINA PECTORIS: Primary | ICD-10-CM

## 2025-04-02 PROCEDURE — 3079F DIAST BP 80-89 MM HG: CPT | Performed by: INTERNAL MEDICINE

## 2025-04-02 PROCEDURE — 93000 ELECTROCARDIOGRAM COMPLETE: CPT | Performed by: INTERNAL MEDICINE

## 2025-04-02 PROCEDURE — 99204 OFFICE O/P NEW MOD 45 MIN: CPT | Performed by: INTERNAL MEDICINE

## 2025-04-02 PROCEDURE — 3074F SYST BP LT 130 MM HG: CPT | Performed by: INTERNAL MEDICINE

## 2025-04-02 RX ORDER — ASPIRIN 81 MG/1
81 TABLET ORAL DAILY
COMMUNITY

## 2025-04-02 RX ORDER — ATORVASTATIN CALCIUM 80 MG/1
80 TABLET, FILM COATED ORAL DAILY
COMMUNITY

## 2025-04-02 NOTE — PROGRESS NOTES
Identified pt with two pt identifiers(name and ). Reviewed record in preparation for visit and have obtained necessary documentation.  Chief Complaint   Patient presents with    New Patient    Coronary Artery Disease    Other     PAT  PAC    Cholesterol Problem    Hypertension      /80 (BP Site: Left Upper Arm, Patient Position: Sitting, BP Cuff Size: Medium Adult)   Pulse 88   Temp 98.4 °F (36.9 °C) (Temporal)   Ht 1.88 m (6' 2\")   Wt 84.4 kg (186 lb)   SpO2 97%   BMI 23.88 kg/m²       Medications reviewed/approved by provider.      Health Maintenance Review: Patient reminded of \"due or due soon\" health maintenance. I have asked the patient to contact his/her primary care provider (PCP) for follow-up on his/her health maintenance.    Coordination of Care Questionnaire:  :   1) Have you been to an emergency room, urgent care, or hospitalized since your last visit?  If yes, where when, and reason for visit? no       2. Have seen or consulted any other health care provider since your last visit?   If yes, where when, and reason for visit?  no      Patient is accompanied by self I have received verbal consent from Joon Josue Jr. to discuss any/all medical information while they are present in the room.

## 2025-04-03 ENCOUNTER — APPOINTMENT (OUTPATIENT)
Facility: HOSPITAL | Age: 60
End: 2025-04-03
Payer: MEDICAID

## 2025-04-03 ENCOUNTER — TELEPHONE (OUTPATIENT)
Age: 60
End: 2025-04-03

## 2025-04-03 ENCOUNTER — HOSPITAL ENCOUNTER (EMERGENCY)
Facility: HOSPITAL | Age: 60
Discharge: LEFT BEFORE DISCHARGE | End: 2025-04-03
Attending: EMERGENCY MEDICINE
Payer: MEDICAID

## 2025-04-03 VITALS
OXYGEN SATURATION: 100 % | BODY MASS INDEX: 26.18 KG/M2 | SYSTOLIC BLOOD PRESSURE: 117 MMHG | RESPIRATION RATE: 20 BRPM | WEIGHT: 204 LBS | HEART RATE: 89 BPM | DIASTOLIC BLOOD PRESSURE: 89 MMHG | TEMPERATURE: 98.2 F | HEIGHT: 74 IN

## 2025-04-03 DIAGNOSIS — R10.30 LOWER ABDOMINAL PAIN: Primary | ICD-10-CM

## 2025-04-03 LAB
ALBUMIN SERPL-MCNC: 3.5 G/DL (ref 3.5–5)
ALBUMIN/GLOB SERPL: 1.2 (ref 1.1–2.2)
ALP SERPL-CCNC: 63 U/L (ref 45–117)
ALT SERPL-CCNC: 23 U/L (ref 12–78)
ANION GAP SERPL CALC-SCNC: 14 MMOL/L (ref 2–12)
AST SERPL-CCNC: 20 U/L (ref 15–37)
BASOPHILS # BLD: 0.04 K/UL (ref 0–0.1)
BASOPHILS NFR BLD: 0.8 % (ref 0–1)
BILIRUB SERPL-MCNC: 0.5 MG/DL (ref 0.2–1)
BUN SERPL-MCNC: 12 MG/DL (ref 6–20)
BUN/CREAT SERPL: 10 (ref 12–20)
CALCIUM SERPL-MCNC: 8.6 MG/DL (ref 8.5–10.1)
CHLORIDE SERPL-SCNC: 101 MMOL/L (ref 97–108)
CO2 SERPL-SCNC: 21 MMOL/L (ref 21–32)
CREAT SERPL-MCNC: 1.26 MG/DL (ref 0.7–1.3)
DIFFERENTIAL METHOD BLD: NORMAL
EOSINOPHIL # BLD: 0.1 K/UL (ref 0–0.4)
EOSINOPHIL NFR BLD: 1.9 % (ref 0–7)
ERYTHROCYTE [DISTWIDTH] IN BLOOD BY AUTOMATED COUNT: 11.9 % (ref 11.5–14.5)
GLOBULIN SER CALC-MCNC: 3 G/DL (ref 2–4)
GLUCOSE SERPL-MCNC: 106 MG/DL (ref 65–100)
HCT VFR BLD AUTO: 41 % (ref 36.6–50.3)
HGB BLD-MCNC: 14.4 G/DL (ref 12.1–17)
IMM GRANULOCYTES # BLD AUTO: 0.01 K/UL (ref 0–0.04)
IMM GRANULOCYTES NFR BLD AUTO: 0.2 % (ref 0–0.5)
INR PPP: 1 (ref 0.9–1.1)
LIPASE SERPL-CCNC: 51 U/L (ref 13–75)
LYMPHOCYTES # BLD: 2.13 K/UL (ref 0.8–3.5)
LYMPHOCYTES NFR BLD: 40.4 % (ref 12–49)
MCH RBC QN AUTO: 33.1 PG (ref 26–34)
MCHC RBC AUTO-ENTMCNC: 35.1 G/DL (ref 30–36.5)
MCV RBC AUTO: 94.3 FL (ref 80–99)
MONOCYTES # BLD: 0.62 K/UL (ref 0–1)
MONOCYTES NFR BLD: 11.8 % (ref 5–13)
NEUTS SEG # BLD: 2.37 K/UL (ref 1.8–8)
NEUTS SEG NFR BLD: 44.9 % (ref 32–75)
NRBC # BLD: 0 K/UL (ref 0–0.01)
NRBC BLD-RTO: 0 PER 100 WBC
PLATELET # BLD AUTO: 226 K/UL (ref 150–400)
PMV BLD AUTO: 9.6 FL (ref 8.9–12.9)
POTASSIUM SERPL-SCNC: 3.4 MMOL/L (ref 3.5–5.1)
PROT SERPL-MCNC: 6.5 G/DL (ref 6.4–8.2)
PROTHROMBIN TIME: 10.5 SEC (ref 9.2–11.2)
RBC # BLD AUTO: 4.35 M/UL (ref 4.1–5.7)
SODIUM SERPL-SCNC: 136 MMOL/L (ref 136–145)
WBC # BLD AUTO: 5.3 K/UL (ref 4.1–11.1)

## 2025-04-03 PROCEDURE — 80053 COMPREHEN METABOLIC PANEL: CPT

## 2025-04-03 PROCEDURE — 99285 EMERGENCY DEPT VISIT HI MDM: CPT

## 2025-04-03 PROCEDURE — 85025 COMPLETE CBC W/AUTO DIFF WBC: CPT

## 2025-04-03 PROCEDURE — 85610 PROTHROMBIN TIME: CPT

## 2025-04-03 PROCEDURE — 36415 COLL VENOUS BLD VENIPUNCTURE: CPT

## 2025-04-03 PROCEDURE — 83690 ASSAY OF LIPASE: CPT

## 2025-04-03 PROCEDURE — 74177 CT ABD & PELVIS W/CONTRAST: CPT

## 2025-04-03 PROCEDURE — 6360000004 HC RX CONTRAST MEDICATION: Performed by: EMERGENCY MEDICINE

## 2025-04-03 RX ORDER — IOPAMIDOL 755 MG/ML
100 INJECTION, SOLUTION INTRAVASCULAR
Status: COMPLETED | OUTPATIENT
Start: 2025-04-03 | End: 2025-04-03

## 2025-04-03 RX ADMIN — IOPAMIDOL 100 ML: 755 INJECTION, SOLUTION INTRAVENOUS at 14:51

## 2025-04-03 ASSESSMENT — PAIN SCALES - GENERAL: PAINLEVEL_OUTOF10: 9

## 2025-04-03 ASSESSMENT — PAIN - FUNCTIONAL ASSESSMENT: PAIN_FUNCTIONAL_ASSESSMENT: 0-10

## 2025-04-03 NOTE — ED NOTES
Pt verbally aggressive with MD stating that it is \"ridiculous that an Emergency room takes this long\". This writer apologized to pt.

## 2025-04-03 NOTE — TELEPHONE ENCOUNTER
Mr. Josue called office stating that he was having abdominal pains.  Rating pain # 9 on pain scale of 1-10 and he told me earlier he was nauseated.  Asking for some pain medication to help with the pain.      I told patient to call his PCP.  Mr. Josue told me he did call his PCP and he was told to call our office.    Mr. Josue  was told to go to the ER with a pain rating # 9 and with nausea.

## 2025-04-03 NOTE — ED TRIAGE NOTES
Pt arrives POV with c/o colon problems. States that he has upcoming colon sx for polyps and biopsies and he notes nausea, pain, and some blood in his stool last night and today.

## 2025-04-04 NOTE — ED PROVIDER NOTES
[facility] EMERGENCY DEPARTMENT  EMERGENCY DEPARTMENT ENCOUNTER       Pt Name: Joon Josue Jr.  MRN: [id number]  Birthdate 1965  Date of evaluation: 4/3/2025  Provider: Ani Macdonald MD   PCP: Debby Ocampo MD  Note Started: 7:06 AM EDT 4/4/25     CHIEF COMPLAINT       Chief Complaint   Patient presents with    Abdominal Pain        HISTORY OF PRESENT ILLNESS: 1 or more elements      History From: patient, History limited by: none     Joon Josue Jr. is a 59 y.o. male presents to ED for lower abdominal pain and blood in stool       Please See MDM for Additional Details of the HPI/PMH  Nursing Notes were all reviewed and agreed with or any disagreements were addressed in the HPI.     REVIEW OF SYSTEMS        Positives and Pertinent negatives as per HPI.    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/09/2016    VF arrest/lytics/[facility]/cath/no PCI    Asthma     Cervical radiculopathy     Chronic obstructive pulmonary disease (HCC)     Chronic pain     Coronary heart disease     Diverticulosis     GERD (gastroesophageal reflux disease)     Hypertension     Pneumonia     PTSD (post-traumatic stress disorder)     Shingles     Tubular adenoma        Past Surgical History:  Past Surgical History:   Procedure Laterality Date    JOINT REPLACEMENT Right     KNEE ARTHROSCOPY Right     ORTHODONTIC TREATMENT Left     elbow trauma    ORTHOPEDIC SURGERY      removed a mass from right palm oct 13 2017right wrist fxr repair 2016 dec    ORTHOPEDIC SURGERY Right     hand trigger contractures trauma    ROTATOR CUFF REPAIR         Family History:  Family History   Problem Relation Age of Onset    Cancer Father        Social History:  Social History     Tobacco Use    Smoking status: Every Day     Current packs/day: 1.25     Average packs/day: 1.3 packs/day for 42.3 years (52.8 ttl pk-yrs)     Types: Cigarettes     Start date:

## 2025-04-11 ENCOUNTER — CLINICAL DOCUMENTATION (OUTPATIENT)
Age: 60
End: 2025-04-11

## 2025-04-11 ENCOUNTER — OFFICE VISIT (OUTPATIENT)
Age: 60
End: 2025-04-11
Payer: MEDICAID

## 2025-04-11 VITALS
BODY MASS INDEX: 24.38 KG/M2 | WEIGHT: 190 LBS | RESPIRATION RATE: 18 BRPM | HEIGHT: 74 IN | HEART RATE: 81 BPM | DIASTOLIC BLOOD PRESSURE: 82 MMHG | SYSTOLIC BLOOD PRESSURE: 119 MMHG | OXYGEN SATURATION: 98 % | TEMPERATURE: 97.8 F

## 2025-04-11 DIAGNOSIS — G89.29 CHRONIC ABDOMINAL PAIN: Primary | ICD-10-CM

## 2025-04-11 DIAGNOSIS — I25.10 CORONARY ARTERY DISEASE INVOLVING NATIVE CORONARY ARTERY OF NATIVE HEART WITHOUT ANGINA PECTORIS: ICD-10-CM

## 2025-04-11 DIAGNOSIS — G89.29 CHRONIC INTRACTABLE HEADACHE, UNSPECIFIED HEADACHE TYPE: ICD-10-CM

## 2025-04-11 DIAGNOSIS — G44.021 INTRACTABLE CHRONIC CLUSTER HEADACHE: ICD-10-CM

## 2025-04-11 DIAGNOSIS — I63.9 ACUTE STROKE DUE TO ISCHEMIA (HCC): ICD-10-CM

## 2025-04-11 DIAGNOSIS — E87.6 HYPOKALEMIA: ICD-10-CM

## 2025-04-11 DIAGNOSIS — R51.9 CHRONIC INTRACTABLE HEADACHE, UNSPECIFIED HEADACHE TYPE: ICD-10-CM

## 2025-04-11 DIAGNOSIS — R10.9 CHRONIC ABDOMINAL PAIN: Primary | ICD-10-CM

## 2025-04-11 PROCEDURE — 3074F SYST BP LT 130 MM HG: CPT | Performed by: STUDENT IN AN ORGANIZED HEALTH CARE EDUCATION/TRAINING PROGRAM

## 2025-04-11 PROCEDURE — 3079F DIAST BP 80-89 MM HG: CPT | Performed by: STUDENT IN AN ORGANIZED HEALTH CARE EDUCATION/TRAINING PROGRAM

## 2025-04-11 PROCEDURE — 99214 OFFICE O/P EST MOD 30 MIN: CPT | Performed by: STUDENT IN AN ORGANIZED HEALTH CARE EDUCATION/TRAINING PROGRAM

## 2025-04-11 RX ORDER — PREGABALIN 50 MG/1
CAPSULE ORAL
Qty: 33 CAPSULE | Refills: 0 | Status: SHIPPED | OUTPATIENT
Start: 2025-04-11 | End: 2025-05-14

## 2025-04-11 RX ORDER — BUTALBITAL, ACETAMINOPHEN AND CAFFEINE 50; 325; 40 MG/1; MG/1; MG/1
1 TABLET ORAL EVERY 6 HOURS PRN
Qty: 15 TABLET | Refills: 2 | Status: SHIPPED | OUTPATIENT
Start: 2025-04-11

## 2025-04-11 RX ORDER — KETOROLAC TROMETHAMINE 10 MG/1
10 TABLET, FILM COATED ORAL DAILY PRN
Qty: 30 TABLET | Refills: 1 | Status: SHIPPED | OUTPATIENT
Start: 2025-04-11 | End: 2026-04-11

## 2025-04-11 RX ORDER — ONDANSETRON 4 MG/1
4 TABLET, ORALLY DISINTEGRATING ORAL 3 TIMES DAILY PRN
Qty: 21 TABLET | Refills: 0 | Status: SHIPPED | OUTPATIENT
Start: 2025-04-11

## 2025-04-11 ASSESSMENT — PATIENT HEALTH QUESTIONNAIRE - PHQ9
SUM OF ALL RESPONSES TO PHQ QUESTIONS 1-9: 0
2. FEELING DOWN, DEPRESSED OR HOPELESS: NOT AT ALL
6. FEELING BAD ABOUT YOURSELF - OR THAT YOU ARE A FAILURE OR HAVE LET YOURSELF OR YOUR FAMILY DOWN: NOT AT ALL
1. LITTLE INTEREST OR PLEASURE IN DOING THINGS: NOT AT ALL
SUM OF ALL RESPONSES TO PHQ QUESTIONS 1-9: 0
8. MOVING OR SPEAKING SO SLOWLY THAT OTHER PEOPLE COULD HAVE NOTICED. OR THE OPPOSITE, BEING SO FIGETY OR RESTLESS THAT YOU HAVE BEEN MOVING AROUND A LOT MORE THAN USUAL: NOT AT ALL
5. POOR APPETITE OR OVEREATING: NOT AT ALL
9. THOUGHTS THAT YOU WOULD BE BETTER OFF DEAD, OR OF HURTING YOURSELF: NOT AT ALL
10. IF YOU CHECKED OFF ANY PROBLEMS, HOW DIFFICULT HAVE THESE PROBLEMS MADE IT FOR YOU TO DO YOUR WORK, TAKE CARE OF THINGS AT HOME, OR GET ALONG WITH OTHER PEOPLE: NOT DIFFICULT AT ALL
7. TROUBLE CONCENTRATING ON THINGS, SUCH AS READING THE NEWSPAPER OR WATCHING TELEVISION: NOT AT ALL
3. TROUBLE FALLING OR STAYING ASLEEP: NOT AT ALL
4. FEELING TIRED OR HAVING LITTLE ENERGY: NOT AT ALL

## 2025-04-11 NOTE — PROGRESS NOTES
Clearance Letter was faxed on:  04/01/2025    To:  Dr. Brewer    Telephone number:  [phone]    Fax number:  [phone]    Procedure:   Colonoscopy              Procedure Date:  05/08/2025      Clearance Letter was faxed on:  04/03/2025    To:  Dr. Ocampo    Telephone number:  [phone]    Fax number:  [phone]    Procedure:  Colonoscopy                  Procedure Date:  05/08/2025

## 2025-04-11 NOTE — PROGRESS NOTES
\"Have you been to the ER, urgent care clinic since your last visit?  Hospitalized since your last visit?\"    NO    “Have you seen or consulted any other health care providers outside our system since your last visit?”    NO           
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    Cerebral arteriovenous malformation (AVM)    PAC (premature atrial contraction)    AVM (arteriovenous malformation)    History of multiple cerebrovascular accidents (CVAs)    Implantable loop recorder present    PAT (paroxysmal atrial tachycardia)    RLQ abdominal pain    Personal history of adenomatous and serrated colon polyps    Weight loss       Past Medical History:   Diagnosis Date    Acute MI, anterior wall (HCC) 01/09/2016    VF arrest/lytics/[facility]flight [facility]/cath/no PCI    Asthma     Cervical radiculopathy     Chronic obstructive pulmonary disease (HCC)     Chronic pain     Coronary heart disease     Diverticulosis     GERD (gastroesophageal reflux disease)     Hypertension     Pneumonia     PTSD (post-traumatic stress disorder)     Shingles     Tubular adenoma          Current Outpatient Medications:     ondansetron (ZOFRAN-ODT) 4 MG disintegrating tablet, Take 1 tablet by mouth 3 times daily as needed for Nausea or Vomiting, Disp: 21 tablet, Rfl: 0    butalbital-acetaminophen-caffeine (FIORICET, ESGIC) -40 MG per tablet, Take 1 tablet by mouth every 6 hours as needed for Headaches, Disp: 15 tablet, Rfl: 2    pregabalin (LYRICA) 50 MG capsule, Take 1 capsule by mouth daily for 3 days, THEN 1 capsule daily for 30 days. Start with once daily for 3 days can increase to twice daily if needed. Max Daily Amount: 50 mg., Disp: 33 capsule, Rfl: 0    ketorolac (TORADOL) 10 MG tablet, Take 1 tablet by mouth daily as needed for Pain, Disp: 30 tablet, Rfl: 1    aspirin 81 MG EC tablet, Take 1 tablet by mouth daily, Disp: , Rfl:     atorvastatin (LIPITOR) 80 MG tablet, Take 1 tablet by mouth daily, Disp: , Rfl:     Multiple

## 2025-04-16 ENCOUNTER — CLINICAL DOCUMENTATION (OUTPATIENT)
Age: 60
End: 2025-04-16

## 2025-04-16 ENCOUNTER — TELEPHONE (OUTPATIENT)
Age: 60
End: 2025-04-16

## 2025-04-16 DIAGNOSIS — G44.021 INTRACTABLE CHRONIC CLUSTER HEADACHE: ICD-10-CM

## 2025-04-16 NOTE — PROGRESS NOTES
\"Have you been to the ER, urgent care clinic since your last visit?  Hospitalized since your last visit?\"    {YES/NO:[id number]}    “Have you seen or consulted any other health care providers outside our system since your last visit?”    {YES/NO:[id number]}        {Click Here for Release of Records Request :[id number]}

## 2025-04-16 NOTE — TELEPHONE ENCOUNTER
Can we please call patient's neurologist again.  I had reached out to them on the day of his last visit however have not heard back.    Dr Cherry [phone] (Work)    He is scheduled to have a diagnostic colonoscopy on 5/8.  GI is requesting holding his aspirin for 7 days and his Plavix 5 days preoperatively.  I want to clear this with his neurologist given his history of stoke.    Thank you

## 2025-04-16 NOTE — TELEPHONE ENCOUNTER
Patient called in the office and stated he was having severe abdominal pain and the meds given by Dr Ocampo was not working. I told him DR Ocampo was not in the office and if he was in that much pain needs to be evaluated in ER. He stated he wanted Dr Angeles to call him tomorrow to discuss and he did not want to keep going back and worth to ER

## 2025-04-17 ENCOUNTER — TELEPHONE (OUTPATIENT)
Age: 60
End: 2025-04-17

## 2025-04-17 NOTE — TELEPHONE ENCOUNTER
Fortino Donnelly MD  [address] [place] [id number] [phone] phone  [phone] fax

## 2025-04-17 NOTE — TELEPHONE ENCOUNTER
Dilip Taylor MD   [address] [facility] [id number]   [id number]   [place] [address]   Phone: tel:[phone]   fax:[phone]        Left message with clinical staff member KARL Miranda.  He is scheduled to have a diagnostic colonoscopy on 5/8.  GI is requesting holding his aspirin for 7 days and his Plavix 5 days preoperatively. Dr. Ocampo wants to clear this with his neurologist given his history of stoke. KARL Miranda stated she would send the message high priority.

## 2025-04-17 NOTE — TELEPHONE ENCOUNTER
Spoke to neurologist, Dr Taylor   he recommends that the patient remain on his aspirin.    It Is okay to hold his Plavix 5 days before his colonoscopy as requested   Can we please let patient know and and reach out to Dr Donnelly as well.  Thank you! - Debby

## 2025-04-17 NOTE — TELEPHONE ENCOUNTER
Called [facility] office and spoke to JASON Myers.  She stated she would need order from cardiologist.  Gave her Dr. Taylor's information.  She stated she will contact them.

## 2025-04-17 NOTE — TELEPHONE ENCOUNTER
I have called patient x2 today   Unable to reach him- busy tone   I am sorry to hear he is not having significant pain.    Unfortunately I am not sure where to go from here from his abdominal pain.  They did not see anything in his CT scan or blood work to point to a specific cause of his pain.  He is having any new nausea or vomiting I am happy to call in medication to help with this. Any increased gas or spasms?  If any urinary symptoms could drop a urine sample to r/o infection   However this pain is worsening or having new symptoms he needs to be reevaluated.    I also spoke with his neurologist.  He has an upcoming virtual visit to discuss medication for headache as well.  He had recommended again the occipital block for his headaches.  If you would like a new referral to pain management I am happy to place this.

## 2025-04-17 NOTE — TELEPHONE ENCOUNTER
Patient identified by Name and Date of birth.      Patient states the pain is in his lower abdomin and colon.  He states he is not having pain when urinating.  He also states that he always urinary frequency and does not believes he has an infection.  Patient states he has the Zofran you gave him last week.  Also he was unable to get the Ketoralac because of the cost. Patient states he would take a referral to pain management.  He stated he is in so much pain, he thinks it may be coming from his polyps that he has in his colon.   He states he is in so much pain.  Pain level 8.  Patient advised to go to ER. States he doesn't drive and they would take him to [place]. He would like to know if there is anything that could replace the Ketoralac.

## 2025-04-17 NOTE — TELEPHONE ENCOUNTER
Called patient to let him know his appointment was on May 8th.  I did confirm this with [facility] office. Nurse told me he had just called and confirmed with her.

## 2025-04-18 RX ORDER — KETOROLAC TROMETHAMINE 10 MG/1
10 TABLET, FILM COATED ORAL DAILY PRN
Qty: 30 TABLET | Refills: 1 | Status: SHIPPED | OUTPATIENT
Start: 2025-04-18 | End: 2026-04-18

## 2025-04-18 NOTE — TELEPHONE ENCOUNTER
If the ketorolac tends to work better than medications such as ibuprofen, Aleve or other NSAIDs in the same family I can try sending it to [facility] pharmacy.  With the [facility] card this should be $11.    I can try sending it there for him. Can also use [facility] on phone     If he has not been taking it and would like a Toradol shot we could try this if we have nursing staff to administer.

## 2025-04-21 ENCOUNTER — CLINICAL SUPPORT (OUTPATIENT)
Age: 60
End: 2025-04-21
Payer: MEDICAID

## 2025-04-21 VITALS — TEMPERATURE: 97.4 F

## 2025-04-21 DIAGNOSIS — G44.021 INTRACTABLE CHRONIC CLUSTER HEADACHE: Primary | ICD-10-CM

## 2025-04-21 PROCEDURE — 96372 THER/PROPH/DIAG INJ SC/IM: CPT | Performed by: STUDENT IN AN ORGANIZED HEALTH CARE EDUCATION/TRAINING PROGRAM

## 2025-04-21 RX ADMIN — KETOROLAC TROMETHAMINE 30 MG: 30 INJECTION, SOLUTION INTRAMUSCULAR; INTRAVENOUS at 14:04

## 2025-04-21 NOTE — PROGRESS NOTES
30mg of toradol administered in left arm.  Patient tolerated medication well.  AVS provided to patient with medication information.  Patient states understanding.

## 2025-04-21 NOTE — PATIENT INSTRUCTIONS
Patient Education        Ketorolac Injection  (delphine toe role' ak)  Brand Name(s): Toradol®¶; also available generically  IMPORTANT WARNING:  Ketorolac injection is used for the short-term relief of moderately severe pain in people who are at least 17 years of age. Ketorolac injection should not be used for longer than 5 days, for mild pain, or for pain from chronic (long-term) conditions. You will receive your first doses of ketorolac by intravenous (into a vein) or intramuscular (into a muscle) injection in a hospital or medical office. After that, your doctor may choose to continue your treatment with oral ketorolac. You must stop taking oral ketorolac and using ketorolac injection on the fifth day after you received your first dose of ketorolac injection. Talk to your doctor if you still have pain after 5 days or if your pain is not controlled with this medication. Ketorolac may cause serious side effects.  People who are treated with nonsteroidal anti-inflammatory drugs (NSAIDs) (other than aspirin) such as ketorolac may have a higher risk of having a heart attack or a stroke than people who are not treated with these medications. These events may happen without warning and may cause death. This risk may be higher for people who are treated with NSAIDs for a long time. Tell your doctor if you or anyone in your family has or has ever had heart disease, a heart attack, or a stroke or 'ministroke;' and if you have or have ever had high blood pressure. Get emergency medical help right away if you experience any of the following symptoms: chest pain, shortness of breath, weakness in one part or side of the body, or slurred speech.  Receiving ketorolac injection increases the risk that you will experience severe or uncontrolled bleeding. Tell your doctor if you have or have ever had a bleeding or clotting problem. Your doctor will probably not give you ketorolac injection.  If you are having surgery, including dental

## 2025-04-23 ENCOUNTER — TELEPHONE (OUTPATIENT)
Age: 60
End: 2025-04-23

## 2025-04-23 NOTE — TELEPHONE ENCOUNTER
Received Medical Clearance letter. Letter stated patient must continue taking his Aspirin.      Can stop Plavix 5 days prior to scheduled Colonoscopy (05/08/2025)    Patient was told not to take his Plavix on Saturday May 3rd and resume Plavix after his scheduled Colonoscopy.

## 2025-04-24 NOTE — TELEPHONE ENCOUNTER
I am sorry that he is still having pain.  I do not have a clear cause of his abdominal pain yet and therefore I am not sure what to offer for additional pain control.  If he is having severe symptoms I do recommend that he be evaluated in the emergency department.  I had sent his oral Toradol to an alternative pharmacy which should be less expensive with [facility].  I would expect this to help more with his head pain.  I also recommend he not take this often as it can be hard on the kidneys and the lining of the stomach.  If he is having any new symptoms such as spasms?  Can we also give him the number for GI he have an appointment scheduled if colonoscopy does not show a cause of his symptoms.  He was previously seen through [facility] I also referred him through [facility].  Either would be fine to follow-up.    Thank you

## 2025-04-28 ENCOUNTER — HOSPITAL ENCOUNTER (EMERGENCY)
Facility: HOSPITAL | Age: 60
Discharge: HOME | End: 2025-04-28
Attending: EMERGENCY MEDICINE
Payer: MEDICAID

## 2025-04-28 ENCOUNTER — APPOINTMENT (OUTPATIENT)
Facility: HOSPITAL | Age: 60
End: 2025-04-28
Payer: MEDICAID

## 2025-04-28 VITALS
TEMPERATURE: 97.6 F | RESPIRATION RATE: 18 BRPM | WEIGHT: 190 LBS | OXYGEN SATURATION: 99 % | DIASTOLIC BLOOD PRESSURE: 88 MMHG | SYSTOLIC BLOOD PRESSURE: 127 MMHG | BODY MASS INDEX: 24.39 KG/M2 | HEART RATE: 69 BPM

## 2025-04-28 DIAGNOSIS — S50.01XA CONTUSION OF RIGHT ELBOW, INITIAL ENCOUNTER: Primary | ICD-10-CM

## 2025-04-28 PROCEDURE — 99283 EMERGENCY DEPT VISIT LOW MDM: CPT

## 2025-04-28 PROCEDURE — 6370000000 HC RX 637 (ALT 250 FOR IP): Performed by: EMERGENCY MEDICINE

## 2025-04-28 PROCEDURE — 73080 X-RAY EXAM OF ELBOW: CPT

## 2025-04-28 RX ORDER — OXYCODONE HYDROCHLORIDE 5 MG/1
5 TABLET ORAL EVERY 6 HOURS PRN
Qty: 8 TABLET | Refills: 0 | Status: SHIPPED | OUTPATIENT
Start: 2025-04-28 | End: 2025-05-01

## 2025-04-28 RX ORDER — OXYCODONE HYDROCHLORIDE 5 MG/1
5 TABLET ORAL
Refills: 0 | Status: COMPLETED | OUTPATIENT
Start: 2025-04-28 | End: 2025-04-28

## 2025-04-28 RX ADMIN — OXYCODONE 5 MG: 5 TABLET ORAL at 09:34

## 2025-04-28 ASSESSMENT — PAIN DESCRIPTION - LOCATION: LOCATION: ELBOW

## 2025-04-28 ASSESSMENT — PAIN DESCRIPTION - ORIENTATION: ORIENTATION: RIGHT

## 2025-04-28 ASSESSMENT — PAIN - FUNCTIONAL ASSESSMENT: PAIN_FUNCTIONAL_ASSESSMENT: 0-10

## 2025-04-28 ASSESSMENT — PAIN SCALES - GENERAL
PAINLEVEL_OUTOF10: 9
PAINLEVEL_OUTOF10: 1

## 2025-04-28 NOTE — ED PROVIDER NOTES
100 MG tablet  Commonly known as: TOPAMAX     traZODone 150 MG tablet  Commonly known as: DESYREL  Take 2 tablets by mouth nightly               Where to Get Your Medications        These medications were sent to [facility]/pharmacy #[id number] - [place], [place] - [address] [facility] - P [phone] - F [phone]  [address] [facility] [address]      Phone: [phone]   oxyCODONE 5 MG immediate release tablet           DISCONTINUED MEDICATIONS:  Discharge Medication List as of 4/28/2025  9:52 AM          I am the Primary Clinician of Record.   Gonsalo Martinez DO (electronically signed)    (Please note that parts of this dictation were completed with voice recognition software. Quite often unanticipated grammatical, syntax, homophones, and other interpretive errors are inadvertently transcribed by the computer software. Please disregards these errors. Please excuse any errors that have escaped final proofreading.)         Gonsalo Martinez DO  04/28/25 0290

## 2025-04-28 NOTE — ED TRIAGE NOTES
Pt arrived with c/o right elbow pain, states he had a mechanical fall Saturday night and hit his elbow on the sink.

## 2025-04-30 ENCOUNTER — TELEPHONE (OUTPATIENT)
Age: 60
End: 2025-04-30

## 2025-04-30 NOTE — TELEPHONE ENCOUNTER
Reminded patient of his upcoming Colonoscopy May 8, 2023.  Reminded patient to stop his Plavix on May 3 (Saturday) and resume medication after his colonoscopy.

## 2025-05-02 ENCOUNTER — OFFICE VISIT (OUTPATIENT)
Age: 60
End: 2025-05-02
Payer: MEDICAID

## 2025-05-02 VITALS
HEART RATE: 78 BPM | HEIGHT: 74 IN | TEMPERATURE: 98.1 F | BODY MASS INDEX: 24.38 KG/M2 | DIASTOLIC BLOOD PRESSURE: 78 MMHG | SYSTOLIC BLOOD PRESSURE: 131 MMHG | RESPIRATION RATE: 16 BRPM | WEIGHT: 190 LBS | OXYGEN SATURATION: 100 %

## 2025-05-02 DIAGNOSIS — Z86.73 HISTORY OF MULTIPLE CEREBROVASCULAR ACCIDENTS (CVAS): ICD-10-CM

## 2025-05-02 DIAGNOSIS — I25.10 CORONARY ARTERY DISEASE INVOLVING NATIVE CORONARY ARTERY OF NATIVE HEART WITHOUT ANGINA PECTORIS: ICD-10-CM

## 2025-05-02 DIAGNOSIS — M70.21 OLECRANON BURSITIS OF RIGHT ELBOW: Primary | ICD-10-CM

## 2025-05-02 DIAGNOSIS — G89.29 CHRONIC ABDOMINAL PAIN: ICD-10-CM

## 2025-05-02 DIAGNOSIS — R10.9 CHRONIC ABDOMINAL PAIN: ICD-10-CM

## 2025-05-02 DIAGNOSIS — R55 SYNCOPE, UNSPECIFIED SYNCOPE TYPE: ICD-10-CM

## 2025-05-02 PROCEDURE — 3078F DIAST BP <80 MM HG: CPT | Performed by: STUDENT IN AN ORGANIZED HEALTH CARE EDUCATION/TRAINING PROGRAM

## 2025-05-02 PROCEDURE — 3075F SYST BP GE 130 - 139MM HG: CPT | Performed by: STUDENT IN AN ORGANIZED HEALTH CARE EDUCATION/TRAINING PROGRAM

## 2025-05-02 PROCEDURE — 99214 OFFICE O/P EST MOD 30 MIN: CPT | Performed by: STUDENT IN AN ORGANIZED HEALTH CARE EDUCATION/TRAINING PROGRAM

## 2025-05-02 RX ORDER — OXYCODONE HYDROCHLORIDE 5 MG/1
5 TABLET ORAL
COMMUNITY
Start: 2025-04-28 | End: 2025-05-02 | Stop reason: ALTCHOICE

## 2025-05-02 RX ORDER — OXYCODONE HYDROCHLORIDE 5 MG/1
5 TABLET ORAL EVERY 8 HOURS PRN
Qty: 5 TABLET | Refills: 0 | Status: SHIPPED | OUTPATIENT
Start: 2025-05-02 | End: 2025-05-05

## 2025-05-02 ASSESSMENT — PATIENT HEALTH QUESTIONNAIRE - PHQ9
2. FEELING DOWN, DEPRESSED OR HOPELESS: NOT AT ALL
SUM OF ALL RESPONSES TO PHQ QUESTIONS 1-9: 0
1. LITTLE INTEREST OR PLEASURE IN DOING THINGS: NOT AT ALL

## 2025-05-02 NOTE — PROGRESS NOTES
Chief Complaint   Patient presents with    Fall    Elbow Problem     Burst bursa sac - right arm       Vitals:    05/02/25 0746   BP: 131/78   Pulse: 78   Resp: 16   Temp: 98.1 °F (36.7 °C)   SpO2: 100%     Have you been to the ER, urgent care clinic since your last visit?  Hospitalized since your last visit?   YES - When: approximately 4 days ago.  Where and Why: fall burst bursa sac.    Have you seen or consulted any other health care providers outside our system since your last visit?   NO             
extended release tablet, Take 1 tablet by mouth daily, Disp: , Rfl:     cloNIDine (CATAPRES) 0.1 MG tablet, Take 1 tablet by mouth 2 times daily as needed, Disp: , Rfl:     losartan (COZAAR) 100 MG tablet, Take 1 tablet by mouth daily, Disp: , Rfl:     methylcellulose (CITRUCEL) oral powder, Take 2 g by mouth as needed, Disp: , Rfl:     polyethylene glycol (GLYCOLAX) 17 GM/SCOOP powder, Take 17 g by mouth as needed, Disp: , Rfl:     topiramate (TOPAMAX) 100 MG tablet, Take 1 tablet by mouth daily, Disp: , Rfl:     ipratropium (ATROVENT HFA) 17 MCG/ACT inhaler, Inhale 1 puff into the lungs 3 times daily, Disp: 1 each, Rfl: 5    albuterol sulfate HFA (PROVENTIL;VENTOLIN;PROAIR) 108 (90 Base) MCG/ACT inhaler, Inhale 1 puff into the lungs every 6 hours as needed for Wheezing, Disp: 18 g, Rfl: 5    traZODone (DESYREL) 150 MG tablet, Take 2 tablets by mouth nightly, Disp: 60 tablet, Rfl: 5    Social History     Tobacco Use   Smoking Status Every Day    Current packs/day: 1.25    Average packs/day: 1.3 packs/day for 42.3 years (52.9 ttl pk-yrs)    Types: Cigarettes    Start date: 01/1983   Smokeless Tobacco Never       Review of Systems  ROS:  Gen: denies fever, chills, or fatigue  Resp: denies dyspnea, cough, or wheezing  CV: denies chest pain, pressure, or palpitations  GI:: denies abdominal pain, nausea, vomiting, diarrhea, or constipation  Neuro: denies numbness/tingling or dizziness          Objective:     /78 (BP Site: Left Upper Arm)   Pulse 78   Temp 98.1 °F (36.7 °C) (Oral)   Resp 16   Ht 1.88 m (6' 2\")   Wt 86.2 kg (190 lb)   SpO2 100%   BMI 24.39 kg/m²   Body mass index is 24.39 kg/m².    Physical Exam   General: Alert and oriented. No acute distress.  Well nourished.  HEENT :  Eyes: Sclera white, conjunctiva clear.   Mouth: Pharynx non-erythematous, without exudate   Neck: Supple with FROM. No thyroid-megaly  Lungs: no increased wob, All lobes clear to auscultation bilaterally   Heart :RRR, no

## 2025-05-04 ENCOUNTER — APPOINTMENT (OUTPATIENT)
Facility: HOSPITAL | Age: 60
End: 2025-05-04
Payer: MEDICAID

## 2025-05-04 ENCOUNTER — HOSPITAL ENCOUNTER (EMERGENCY)
Facility: HOSPITAL | Age: 60
Discharge: HOME | End: 2025-05-04
Attending: EMERGENCY MEDICINE
Payer: MEDICAID

## 2025-05-04 VITALS
WEIGHT: 210 LBS | HEART RATE: 77 BPM | DIASTOLIC BLOOD PRESSURE: 69 MMHG | HEIGHT: 74 IN | RESPIRATION RATE: 16 BRPM | SYSTOLIC BLOOD PRESSURE: 115 MMHG | OXYGEN SATURATION: 96 % | TEMPERATURE: 98.3 F | BODY MASS INDEX: 26.95 KG/M2

## 2025-05-04 DIAGNOSIS — S52.124A CLOSED NONDISPLACED FRACTURE OF HEAD OF RIGHT RADIUS, INITIAL ENCOUNTER: Primary | ICD-10-CM

## 2025-05-04 PROCEDURE — 73080 X-RAY EXAM OF ELBOW: CPT

## 2025-05-04 PROCEDURE — 99284 EMERGENCY DEPT VISIT MOD MDM: CPT

## 2025-05-04 PROCEDURE — 73200 CT UPPER EXTREMITY W/O DYE: CPT

## 2025-05-04 ASSESSMENT — PAIN DESCRIPTION - LOCATION
LOCATION: ELBOW
LOCATION: ELBOW

## 2025-05-04 ASSESSMENT — PAIN SCALES - GENERAL
PAINLEVEL_OUTOF10: 5
PAINLEVEL_OUTOF10: 9

## 2025-05-04 ASSESSMENT — PAIN - FUNCTIONAL ASSESSMENT
PAIN_FUNCTIONAL_ASSESSMENT: 0-10
PAIN_FUNCTIONAL_ASSESSMENT: 0-10

## 2025-05-04 ASSESSMENT — PAIN DESCRIPTION - ORIENTATION
ORIENTATION: RIGHT
ORIENTATION: RIGHT

## 2025-05-04 ASSESSMENT — PAIN DESCRIPTION - DESCRIPTORS: DESCRIPTORS: DISCOMFORT

## 2025-05-04 NOTE — DISCHARGE INSTRUCTIONS
Please wear the sling 24 hours a day to keep your arm immobilized.  Follow-up with an orthopedic doctor for further evaluation of your fracture.  Come back to the emergency department immediately for any new or worsening symptoms.

## 2025-05-04 NOTE — ED PROVIDER NOTES
[facility] EMERGENCY DEPARTMENT  EMERGENCY DEPARTMENT ENCOUNTER       Pt Name: Joon Josue Jr.  MRN: [id number]  Birthdate 1965  Date of evaluation: 5/4/2025  Provider: Ani Macdonald MD   PCP: Debby Ocampo MD  Note Started: 2:46 PM EDT 5/4/25     CHIEF COMPLAINT       Chief Complaint   Patient presents with    Elbow Pain        HISTORY OF PRESENT ILLNESS: 1 or more elements      History From: Patient, History limited by: none     Joon Josue Jr. is a 59 y.o. male presents to ED complaining of right elbow pain.  Patient reports he had a fall earlier in the week, banged his right elbow on the counter.  Reports it is now swollen.       Please See MDM for Additional Details of the HPI/PMH  Nursing Notes were all reviewed and agreed with or any disagreements were addressed in the HPI.     REVIEW OF SYSTEMS        Positives and Pertinent negatives as per HPI.    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/09/2016    VF arrest/lytics/[facility]/cath/no PCI    Asthma     Cervical radiculopathy     Chronic obstructive pulmonary disease (HCC)     Chronic pain     Coronary heart disease     Diverticulosis     GERD (gastroesophageal reflux disease)     Hypertension     Pneumonia     PTSD (post-traumatic stress disorder)     Shingles     Tubular adenoma        Past Surgical History:  Past Surgical History:   Procedure Laterality Date    JOINT REPLACEMENT Right     KNEE ARTHROSCOPY Right     ORTHODONTIC TREATMENT Left     elbow trauma    ORTHOPEDIC SURGERY      removed a mass from right palm oct 13 2017right wrist fxr repair 2016 dec    ORTHOPEDIC SURGERY Right     hand trigger contractures trauma    ROTATOR CUFF REPAIR         Family History:  Family History   Problem Relation Age of Onset    Cancer Father        Social History:  Social History     Tobacco Use    Smoking status: Every Day     Current packs/day: 1.25

## 2025-05-04 NOTE — ED TRIAGE NOTES
Right elbow 4/26 with it increasing after fall this week. Poor historian d/t hx of brain surgery .

## 2025-05-05 ENCOUNTER — PREP FOR PROCEDURE (OUTPATIENT)
Age: 60
End: 2025-05-05

## 2025-05-05 ENCOUNTER — TELEPHONE (OUTPATIENT)
Age: 60
End: 2025-05-05

## 2025-05-05 NOTE — TELEPHONE ENCOUNTER
Pt called to advise that his surgery has been cancelled.  He is very upset.  I told him to call Dr. Donnelly's office and speak to the .

## 2025-05-05 NOTE — TELEPHONE ENCOUNTER
Mr. Josue was scheduled for colonoscopy on May 8.  Unfortunately yesterday he was diagnosed with a nondisplaced fractured right elbow after ED visit for increased swelling and pain at the right elbow.  He has not been evaluated by orthopedic surgery at this time.  Based on the fact that he is stephany be positioned and his right arm will be extended during the procedure I have recommended that his case be canceled until he is at least evaluated by orthopedic surgery and determined that he can safely proceed forward with the procedure.    Understandably he was upset with this since he had planned on having his colonoscopy however after the conversation he understood.  In an attempt to help address his abdominal complaints in a timely fashion I have told him that once he receives the okay to proceed with colonoscopy from an orthopedic surgeon to immediately notify so that we can reach out to them and confirm that it is safe to proceed and we will arrange to have his colonoscopy done hopefully within 2 weeks of that notification.    He understood and agreed with the plan.

## 2025-05-05 NOTE — TELEPHONE ENCOUNTER
Patient has broken his elbow. Per Xenia and  we need to cancel this procedure. Patient is aware and will call us back to reschedule the colonoscopy.

## 2025-05-05 NOTE — TELEPHONE ENCOUNTER
Dr. Wilkes, orthopedic has called the office. Patient has been cleared for the colonoscopy.  is aware and patient will be called to get him back on the schedule.

## 2025-05-06 ENCOUNTER — RESULTS FOLLOW-UP (OUTPATIENT)
Facility: HOSPITAL | Age: 60
End: 2025-05-06

## 2025-05-06 RX ORDER — CLOPIDOGREL BISULFATE 75 MG/1
75 TABLET ORAL DAILY
COMMUNITY

## 2025-05-06 RX ORDER — OXYCODONE HYDROCHLORIDE 5 MG/1
5 CAPSULE ORAL EVERY 8 HOURS
COMMUNITY

## 2025-05-06 NOTE — PERIOP NOTE
[facility]  SURGICAL PRE-ADMISSION INSTRUCTIONS    ARRIVAL  You will be called the day before your surgery with your expected arrival time.  Sign in at the  of the hospital.  You will be directed to the Surgical Waiting Room.  Please arrive at your scheduled appointment time.  You have been scheduled to arrive for your procedure one or two hours prior to the expected start time of your procedure.  Every effort will be made to minimize your wait but please be aware that unforeseen circumstances may affect our schedule.    EATING  DO NOT EAT OR DRINK ANYTHING AFTER MIDNIGHT ON THE EVENING BEFORE YOUR SURGERY OR ON THE DAY OF YOUR SURGERY except for your medications (as instructed) with a sip of water.  Do not use gum, mints or lozenges on the morning of your surgery.  Please do not smoke or chew tobacco before your surgery.    MEDICATIONS   Take the following medications on the morning of your surgery with the smallest amount of water possible : Amlodipine    STOP THESE MEDICATIONS AT THE TIMES LISTED BELOW  Blood thinner(s) : Plavix ;  Consult with medical doctor     DRIVING/TRANSPORATION  Have a responsible adult to drive you home from the hospital and to stay with you over night.  Please have them plan to remain in the hospital during your surgery.  Your surgery will not be done if you do not have a responsible adult to take you home and to stay with you.  If you have arranged for public transport, you must have a responsible adult to ride with you (who is not the ).  You may not drive for 24 hours after anesthesia.    PREPARATION  If you have a Living WiIl/Advance Directive, please bring a copy with you to scan into your chart.   Please DO NOT wear makeup or nail polish  Please leave valuables at home,  DO NOT wear jewelry.  Wear loose, comfortable clothing that is large enough to cover a bulky dressing.    SPECIAL INSTRUCTIONS:  Follow your surgeon's instructions

## 2025-05-07 ENCOUNTER — TELEPHONE (OUTPATIENT)
Age: 60
End: 2025-05-07

## 2025-05-07 RX ORDER — SODIUM CHLORIDE 9 MG/ML
INJECTION, SOLUTION INTRAVENOUS PRN
Status: CANCELLED | OUTPATIENT
Start: 2025-05-07

## 2025-05-07 RX ORDER — SODIUM CHLORIDE 0.9 % (FLUSH) 0.9 %
5-40 SYRINGE (ML) INJECTION PRN
Status: CANCELLED | OUTPATIENT
Start: 2025-05-07

## 2025-05-07 RX ORDER — SODIUM CHLORIDE 0.9 % (FLUSH) 0.9 %
5-40 SYRINGE (ML) INJECTION EVERY 12 HOURS SCHEDULED
Status: CANCELLED | OUTPATIENT
Start: 2025-05-07

## 2025-05-07 RX ORDER — SODIUM CHLORIDE, SODIUM LACTATE, POTASSIUM CHLORIDE, CALCIUM CHLORIDE 600; 310; 30; 20 MG/100ML; MG/100ML; MG/100ML; MG/100ML
INJECTION, SOLUTION INTRAVENOUS CONTINUOUS
Status: CANCELLED | OUTPATIENT
Start: 2025-05-07

## 2025-05-07 NOTE — H&P
History and Physical      59-year-old male who presents as a referral from Dr. Ocampo for consideration of colonoscopy.  His last colonoscopy was in 2018 where currently at least a single adenomatous polyp was found.  He states over the last 5 to 6 months he is developed right lower quadrant abdominal pain that radiates towards his rectum.  He also apparently had significant constipation and this was associated with rectal bleeding.  He has been on MiraLAX as needed and the rectal bleeding has resolved but he still has abdominal pain that may occur 3-4 times a day in the last 30 minutes to an hour and a half.  He describes as a sharp stabbing pain.  He also believes he is lost 15 pounds over the last month.  He has no family history of colon cancer.  He now feels that his bowels are regular.  He denies any true narrowing of his stool.  He states that when he had some type of blood test when he was at [facility] they told him they had a concern about his colon.    He has been seen at [facility] for a cerebral AVM.  He has had 4 strokes in the past and after his last stroke in May they apparently identified the AVM and he had surgery on this.  He has had no stroke since then.  He has had a loop recorder placed at [facility] to check for A-fib and arrhythmia and has not established with cardiology yet in our region.  In reviewing the chart it appears that he may have an appointment with  on April 2. He also has a history of an MI with V-fib and arrest in 2016.  He states he did not have to have stenting or CABG.  He does not have a pacemaker in place.    He is on Plavix and aspirin and is not sure but believes his neurologist is the one who has of all these medications.    He has a 30-pack-year history of tobacco use where he was averaging a pack a day but states over the last 3 months he is trying to discontinue his smoking and is down to 3 to 4 cigarettes a day.  On the weekends he will have a few beers.    He had a CT scan of

## 2025-05-07 NOTE — H&P (VIEW-ONLY)
History and Physical      59-year-old male who presents as a referral from Dr. Ocampo for consideration of colonoscopy.  His last colonoscopy was in 2018 where currently at least a single adenomatous polyp was found.  He states over the last 5 to 6 months he is developed right lower quadrant abdominal pain that radiates towards his rectum.  He also apparently had significant constipation and this was associated with rectal bleeding.  He has been on MiraLAX as needed and the rectal bleeding has resolved but he still has abdominal pain that may occur 3-4 times a day in the last 30 minutes to an hour and a half.  He describes as a sharp stabbing pain.  He also believes he is lost 15 pounds over the last month.  He has no family history of colon cancer.  He now feels that his bowels are regular.  He denies any true narrowing of his stool.  He states that when he had some type of blood test when he was at [facility] they told him they had a concern about his colon.    He has been seen at [facility] for a cerebral AVM.  He has had 4 strokes in the past and after his last stroke in May they apparently identified the AVM and he had surgery on this.  He has had no stroke since then.  He has had a loop recorder placed at [facility] to check for A-fib and arrhythmia and has not established with cardiology yet in our region.  In reviewing the chart it appears that he may have an appointment with  on April 2. He also has a history of an MI with V-fib and arrest in 2016.  He states he did not have to have stenting or CABG.  He does not have a pacemaker in place.    He is on Plavix and aspirin and is not sure but believes his neurologist is the one who has of all these medications.    He has a 30-pack-year history of tobacco use where he was averaging a pack a day but states over the last 3 months he is trying to discontinue his smoking and is down to 3 to 4 cigarettes a day.  On the weekends he will have a few beers.    He had a CT scan of

## 2025-05-07 NOTE — TELEPHONE ENCOUNTER
Mr. Josue called this morning concerning his prep for tomorrow. He stated that he didn't get a green folder with the instructions and asked when is he supposed to start his  prep. I informed him that he is suppose to be on a clear liquid diet all day today, and should have already taken the 4 ducolax tablets, he said that he ate two pieces of nichols around 7:00am. I informed him of the items that he needed to get for his prep. He said that he didn't have those items and he couldn't afford to buy them. He said that he is on medicaid and wanted the Ducolax tablets, Miralax and the Gatorade to be called in to his pharmacy. I informed him that the doctor is in surgery this morning and he should have already taken the 4 Ducolax tablets. I also informed him that I would get in touch with the doctor to check and see if he could still have his procedure since he ate nichols and hasn't started his prep. Patient said that they wanted to cancel his procedure the other day and he was mad and he doesn't have money to get the prep items.  I told him I would go get the prep items and he could come by the office and get the green folder and the items. Patient took the 4 ducolax tablet around 9:45 am and I went over the instruction about the miralax and gatorade and informed him to start drinking that at 11:00am today and he could only have clear liquids the rest of the day and nothing to eat or drink after midnight tonight and tomorrow morning. I also showed him on his colonoscopy prep instruction where it stated Do not eat any solid foods, and showed him what he may drink, and the don't  not drink items. Patient said I can't eat anything all day? I said only the clear liquids and please do not eat and solid foods because if he does this procedure will have to be cancelled.  Mr. Josue thank me for getting the items and for going over the instructions.

## 2025-05-08 ENCOUNTER — ANESTHESIA (OUTPATIENT)
Facility: HOSPITAL | Age: 60
End: 2025-05-08
Payer: MEDICAID

## 2025-05-08 ENCOUNTER — ANESTHESIA EVENT (OUTPATIENT)
Facility: HOSPITAL | Age: 60
End: 2025-05-08
Payer: MEDICAID

## 2025-05-08 ENCOUNTER — HOSPITAL ENCOUNTER (OUTPATIENT)
Facility: HOSPITAL | Age: 60
Setting detail: OUTPATIENT SURGERY
Discharge: HOME | End: 2025-05-08
Attending: SURGERY | Admitting: SURGERY
Payer: MEDICAID

## 2025-05-08 VITALS
HEART RATE: 76 BPM | WEIGHT: 210 LBS | OXYGEN SATURATION: 96 % | TEMPERATURE: 97.6 F | DIASTOLIC BLOOD PRESSURE: 86 MMHG | SYSTOLIC BLOOD PRESSURE: 125 MMHG | BODY MASS INDEX: 26.95 KG/M2 | RESPIRATION RATE: 19 BRPM | HEIGHT: 74 IN

## 2025-05-08 PROCEDURE — 6360000002 HC RX W HCPCS: Performed by: ANESTHESIOLOGY

## 2025-05-08 PROCEDURE — 3600000012 HC SURGERY LEVEL 2 ADDTL 15MIN: Performed by: SURGERY

## 2025-05-08 PROCEDURE — 3700000001 HC ADD 15 MINUTES (ANESTHESIA): Performed by: SURGERY

## 2025-05-08 PROCEDURE — 3700000000 HC ANESTHESIA ATTENDED CARE: Performed by: SURGERY

## 2025-05-08 PROCEDURE — 7100000001 HC PACU RECOVERY - ADDTL 15 MIN: Performed by: SURGERY

## 2025-05-08 PROCEDURE — 2580000003 HC RX 258: Performed by: SURGERY

## 2025-05-08 PROCEDURE — 7100000000 HC PACU RECOVERY - FIRST 15 MIN: Performed by: SURGERY

## 2025-05-08 PROCEDURE — 3600000002 HC SURGERY LEVEL 2 BASE: Performed by: SURGERY

## 2025-05-08 PROCEDURE — 88305 TISSUE EXAM BY PATHOLOGIST: CPT

## 2025-05-08 PROCEDURE — 2709999900 HC NON-CHARGEABLE SUPPLY: Performed by: SURGERY

## 2025-05-08 PROCEDURE — 45380 COLONOSCOPY AND BIOPSY: CPT | Performed by: SURGERY

## 2025-05-08 RX ORDER — NALOXONE HYDROCHLORIDE 0.4 MG/ML
INJECTION, SOLUTION INTRAMUSCULAR; INTRAVENOUS; SUBCUTANEOUS PRN
Status: DISCONTINUED | OUTPATIENT
Start: 2025-05-08 | End: 2025-05-08 | Stop reason: HOSPADM

## 2025-05-08 RX ORDER — SODIUM CHLORIDE, SODIUM LACTATE, POTASSIUM CHLORIDE, CALCIUM CHLORIDE 600; 310; 30; 20 MG/100ML; MG/100ML; MG/100ML; MG/100ML
INJECTION, SOLUTION INTRAVENOUS CONTINUOUS
Status: DISCONTINUED | OUTPATIENT
Start: 2025-05-08 | End: 2025-05-08 | Stop reason: HOSPADM

## 2025-05-08 RX ORDER — SODIUM CHLORIDE 0.9 % (FLUSH) 0.9 %
5-40 SYRINGE (ML) INJECTION EVERY 12 HOURS SCHEDULED
Status: DISCONTINUED | OUTPATIENT
Start: 2025-05-08 | End: 2025-05-08 | Stop reason: HOSPADM

## 2025-05-08 RX ORDER — METOCLOPRAMIDE HYDROCHLORIDE 5 MG/ML
10 INJECTION INTRAMUSCULAR; INTRAVENOUS
Status: DISCONTINUED | OUTPATIENT
Start: 2025-05-08 | End: 2025-05-08 | Stop reason: HOSPADM

## 2025-05-08 RX ORDER — PROPOFOL 10 MG/ML
INJECTION, EMULSION INTRAVENOUS
Status: DISCONTINUED | OUTPATIENT
Start: 2025-05-08 | End: 2025-05-08 | Stop reason: SDUPTHER

## 2025-05-08 RX ORDER — GLYCOPYRROLATE 0.2 MG/ML
INJECTION INTRAMUSCULAR; INTRAVENOUS
Status: DISCONTINUED | OUTPATIENT
Start: 2025-05-08 | End: 2025-05-08 | Stop reason: SDUPTHER

## 2025-05-08 RX ORDER — HYDRALAZINE HYDROCHLORIDE 20 MG/ML
10 INJECTION INTRAMUSCULAR; INTRAVENOUS
Status: DISCONTINUED | OUTPATIENT
Start: 2025-05-08 | End: 2025-05-08 | Stop reason: HOSPADM

## 2025-05-08 RX ORDER — SODIUM CHLORIDE 9 MG/ML
INJECTION, SOLUTION INTRAVENOUS PRN
Status: DISCONTINUED | OUTPATIENT
Start: 2025-05-08 | End: 2025-05-08 | Stop reason: HOSPADM

## 2025-05-08 RX ORDER — LIDOCAINE HYDROCHLORIDE 20 MG/ML
INJECTION, SOLUTION EPIDURAL; INFILTRATION; INTRACAUDAL; PERINEURAL
Status: DISCONTINUED | OUTPATIENT
Start: 2025-05-08 | End: 2025-05-08 | Stop reason: SDUPTHER

## 2025-05-08 RX ORDER — LABETALOL HYDROCHLORIDE 5 MG/ML
10 INJECTION, SOLUTION INTRAVENOUS
Status: DISCONTINUED | OUTPATIENT
Start: 2025-05-08 | End: 2025-05-08 | Stop reason: HOSPADM

## 2025-05-08 RX ORDER — SODIUM CHLORIDE 0.9 % (FLUSH) 0.9 %
5-40 SYRINGE (ML) INJECTION PRN
Status: DISCONTINUED | OUTPATIENT
Start: 2025-05-08 | End: 2025-05-08 | Stop reason: HOSPADM

## 2025-05-08 RX ORDER — DIPHENHYDRAMINE HYDROCHLORIDE 50 MG/ML
12.5 INJECTION, SOLUTION INTRAMUSCULAR; INTRAVENOUS
Status: DISCONTINUED | OUTPATIENT
Start: 2025-05-08 | End: 2025-05-08 | Stop reason: HOSPADM

## 2025-05-08 RX ORDER — DEXAMETHASONE SODIUM PHOSPHATE 4 MG/ML
4 INJECTION, SOLUTION INTRA-ARTICULAR; INTRALESIONAL; INTRAMUSCULAR; INTRAVENOUS; SOFT TISSUE
Status: DISCONTINUED | OUTPATIENT
Start: 2025-05-08 | End: 2025-05-08 | Stop reason: HOSPADM

## 2025-05-08 RX ADMIN — LIDOCAINE HYDROCHLORIDE 50 MG: 20 INJECTION, SOLUTION EPIDURAL; INFILTRATION; INTRACAUDAL; PERINEURAL at 10:08

## 2025-05-08 RX ADMIN — PROPOFOL 50 MG: 10 INJECTION, EMULSION INTRAVENOUS at 10:23

## 2025-05-08 RX ADMIN — PROPOFOL 50 MG: 10 INJECTION, EMULSION INTRAVENOUS at 10:27

## 2025-05-08 RX ADMIN — GLYCOPYRROLATE 0.2 MG: 0.2 INJECTION INTRAMUSCULAR; INTRAVENOUS at 10:08

## 2025-05-08 RX ADMIN — PROPOFOL 50 MG: 10 INJECTION, EMULSION INTRAVENOUS at 10:19

## 2025-05-08 RX ADMIN — PROPOFOL 50 MG: 10 INJECTION, EMULSION INTRAVENOUS at 10:16

## 2025-05-08 RX ADMIN — SODIUM CHLORIDE, SODIUM LACTATE, POTASSIUM CHLORIDE, AND CALCIUM CHLORIDE: .6; .31; .03; .02 INJECTION, SOLUTION INTRAVENOUS at 10:08

## 2025-05-08 RX ADMIN — PROPOFOL 100 MG: 10 INJECTION, EMULSION INTRAVENOUS at 10:12

## 2025-05-08 ASSESSMENT — LIFESTYLE VARIABLES: SMOKING_STATUS: 1

## 2025-05-08 ASSESSMENT — PAIN - FUNCTIONAL ASSESSMENT: PAIN_FUNCTIONAL_ASSESSMENT: 0-10

## 2025-05-08 NOTE — INTERVAL H&P NOTE
Update History & Physical    The patient's History and Physical of May 7, 2025 was reviewed with the patient and I examined the patient. There was no change. The surgical site was confirmed by the patient and me.     Plan: The risks, benefits, expected outcome, and alternative to the recommended procedure have been discussed with the patient. Patient understands and wants to proceed with the procedure.     Electronically signed by Fortino Donnelly MD on 5/8/2025 at 10:05 AM

## 2025-05-08 NOTE — OP NOTE
[address]  [address]                            OPERATIVE REPORT      PATIENT NAME: VARGAS MELO               : 1965  MED REC NO: [id number]                       ROOM: OR  ACCOUNT NO: [id number]                       ADMIT DATE: 2025  PROVIDER: Fortino Donnelly MD    DATE OF SERVICE:  2025    PREOPERATIVE DIAGNOSES:  Right lower quadrant abdominal pain and personal history of colon polyps.    POSTOPERATIVE DIAGNOSES:  Right lower quadrant abdominal pain and personal history of colon polyps.    PROCEDURES PERFORMED:  Colonoscopy with cold forceps polypectomy    SURGEON:  Fortino Donnelly MD    ASSISTANT:  None.    ANESTHESIA:  MAC.    ESTIMATED BLOOD LOSS:  Minimal.    SPECIMENS REMOVED:  Transverse colon polyp.    INTRAOPERATIVE FINDINGS:       1. Diminutive polyp in the transverse colon.     2. Internal hemorrhoids.     COMPLICATIONS:  None.    IMPLANTS:  None.    INDICATIONS:  Right lower quadrant abdominal pain and history of adenomatous polyps.    DESCRIPTION OF PROCEDURE:  The patient was brought to the operative theater.  Monitoring devices and nasal cannula O2 placed per Anesthesia.  The patient was placed in a left-side-down decubitus position.  IV sedation was administered and a time-out was performed.  Digital rectal exam was performed, which was essentially normal.  A well-lubricated Olympus colonoscope was introduced into the patient's rectum and advanced to the cecum without any difficulty.  The cecum was identified by the ileocecal valve and appendiceal orifice.  The prep was adequate to proceed.  He did have some frothy bilious material and GI Ease was used to irrigate the colon.  No abnormalities were noted in the cecum or ascending colon.  The transverse colon showed a single diminutive polyp that was removed with a single bite of the cold biopsy forceps with specimen retrieved.  No abnormalities

## 2025-05-08 NOTE — ANESTHESIA PRE PROCEDURE
09:15 AM    LABGLOM 46 04/24/2023 01:50 PM    GLUCOSE 106 04/03/2025 02:07 PM    CALCIUM 8.6 04/03/2025 02:07 PM    BILITOT 0.5 04/03/2025 02:07 PM    ALKPHOS 63 04/03/2025 02:07 PM    ALKPHOS 64 12/07/2022 07:38 PM    AST 20 04/03/2025 02:07 PM    ALT 23 04/03/2025 02:07 PM       POC Tests: No results for input(s): \"POCGLU\", \"POCNA\", \"POCK\", \"POCCL\", \"POCBUN\", \"POCHEMO\", \"POCHCT\" in the last 72 hours.    Coags:   Lab Results   Component Value Date/Time    PROTIME 10.5 04/03/2025 02:07 PM    INR 1.0 04/03/2025 02:07 PM       HCG (If Applicable): No results found for: \"PREGTESTUR\", \"PREGSERUM\", \"HCG\", \"HCGQUANT\"     ABGs: No results found for: \"PHART\", \"PO2ART\", \"KPX0JRI\", \"IWI6NSE\", \"BEART\", \"P6OSSQMT\"     Type & Screen (If Applicable):  No results found for: \"ABORH\", \"LABANTI\"    Drug/Infectious Status (If Applicable):  No results found for: \"HIV\", \"HEPCAB\"    COVID-19 Screening (If Applicable):   Lab Results   Component Value Date/Time    COVID19 Not detected 02/05/2025 02:35 PM           Anesthesia Evaluation  Patient summary reviewed and Nursing notes reviewed  Airway: Mallampati: II  TM distance: >3 FB   Neck ROM: full  Mouth opening: > = 3 FB   Dental:    (+) partials  Comment: Poor dentitia  Partial upper    Pulmonary:normal exam  breath sounds clear to auscultation  (+)  COPD:          asthma: current smoker    (-) pneumonia          Patient smoked on day of surgery.                 Cardiovascular:    (+) hypertension:, past MI:, CAD:        Rhythm: regular  Rate: normal                    Neuro/Psych:   (+) CVA:, neuromuscular disease:, headaches: migraine headaches, tension headaches, psychiatric history:             ROS comment: Cervical radiculopathy GI/Hepatic/Renal:   (+) GERD:, bowel prep          Endo/Other: Negative Endo/Other ROS                    Abdominal: normal exam            Vascular:          Other Findings:       Anesthesia Plan      MAC     ASA 3       Induction:

## 2025-05-08 NOTE — BRIEF OP NOTE
Brief Postoperative Note      Patient: Joon Josue Jr.  YOB: 1965  MRN: [id number]    Date of Procedure: 5/8/2025    Pre-Op Diagnosis Codes:      * RLQ abdominal pain [R10.31]     * Personal history of adenomatous and serrated colon polyps [Z86.0101]     * Weight loss [R63.4]    Post-Op Diagnosis: Same       Procedure(s):  COLONOSCOPY WITH COLD FORCEP POLYPECTOMY    Surgeon(s):  Fortino Donnelly MD    Assistant:  * No surgical staff found *    Anesthesia: Monitor Anesthesia Care    Estimated Blood Loss (mL): Minimal    Complications: None    Specimens:   ID Type Source Tests Collected by Time Destination   1 : Transverse colon polyp Tissue Colon-Transverse SURGICAL PATHOLOGY Fortino Donnelly MD 5/8/2025 1025        Implants:  * No implants in log *      Drains: * No LDAs found *    Findings:  Infection Present At Time Of Surgery (PATOS) (choose all levels that have infection present):  No infection present  Other Findings:   1.  Diminutive polyp of the transverse colon  2.  Internal hemorrhoids    Electronically signed by Fortino Donnelly MD on 5/8/2025 at 10:38 AM

## 2025-05-19 DIAGNOSIS — G89.29 CHRONIC ABDOMINAL PAIN: ICD-10-CM

## 2025-05-19 DIAGNOSIS — R51.9 CHRONIC INTRACTABLE HEADACHE, UNSPECIFIED HEADACHE TYPE: ICD-10-CM

## 2025-05-19 DIAGNOSIS — R10.9 CHRONIC ABDOMINAL PAIN: ICD-10-CM

## 2025-05-19 DIAGNOSIS — G89.29 CHRONIC INTRACTABLE HEADACHE, UNSPECIFIED HEADACHE TYPE: ICD-10-CM

## 2025-05-19 NOTE — PROGRESS NOTES
9 right wrist needs subjective:     Chief Complaint   Patient presents with    Dizziness       Joon Josue Jr. is a 59 y.o. male who presents today for follow up     HPI    CAD:  HTN  History of prior STEMI in 2016 and V-fib arrest.  He is followed by cardiology and has established locally.   Denies any current chest pain palpitations or increased dyspnea.  He is status post loop recorder implantation following multiple CVAs.   He had been followed through [facility] however has established with Dr Osman roque. No changes were made at recent visit.  He was cleared from a cardiac standpoint for colonoscopy.  Recommended PCP/neurology clearance for discontinuation of DAPT periprocedurally       Hx CVA:  Post procedural HA   [facility]  He has a history of multiple CVAs and TBI's   He has been continued on aspirin and Plavix with blood pressure goal less 130 SBP     Found to have AVMs on brain MRI and is followed by neurosurgery underwent gamma knife surgery 5/2024 with continued chronic headaches radiating from the occiput forward.  Patient does not recall what he has tried for pain control in the past with neurology however on chart review appears he has tried Fioricet, amitriptyline, Depakote, Medrol Dosepak, gabapentin, NSAIDs and Tylenol.  He has also tried Nurtec, and Topamax for possible contribution from migraines or tension headache  Ultimately his headache is thought to be due to multifactorial per neurology secondary to medication overuse, prior CVAs, head trauma, stress and AVMs.       He had neurology follow up virtually scheduled however missed this and has not dahlia able to reschedule.     States that since gamma knife procedure he has been having episodes of lightheadedness and feeling of spinning that occur randomly through out the day.   This has gotten worse 6 months ago.  No associated chest pain or SOB. No palpations   He has had syncopal evaluation since patient

## 2025-05-20 ENCOUNTER — TELEPHONE (OUTPATIENT)
Age: 60
End: 2025-05-20

## 2025-05-20 ENCOUNTER — OFFICE VISIT (OUTPATIENT)
Age: 60
End: 2025-05-20
Payer: MEDICAID

## 2025-05-20 VITALS
TEMPERATURE: 97.2 F | OXYGEN SATURATION: 97 % | WEIGHT: 185 LBS | SYSTOLIC BLOOD PRESSURE: 117 MMHG | HEIGHT: 74 IN | HEART RATE: 71 BPM | RESPIRATION RATE: 18 BRPM | BODY MASS INDEX: 23.74 KG/M2 | DIASTOLIC BLOOD PRESSURE: 81 MMHG

## 2025-05-20 DIAGNOSIS — I10 PRIMARY HYPERTENSION: ICD-10-CM

## 2025-05-20 DIAGNOSIS — R55 SYNCOPE, UNSPECIFIED SYNCOPE TYPE: Primary | ICD-10-CM

## 2025-05-20 DIAGNOSIS — I65.29 STENOSIS OF CAROTID ARTERY, UNSPECIFIED LATERALITY: ICD-10-CM

## 2025-05-20 DIAGNOSIS — R42 VERTIGO: ICD-10-CM

## 2025-05-20 PROCEDURE — 3079F DIAST BP 80-89 MM HG: CPT | Performed by: STUDENT IN AN ORGANIZED HEALTH CARE EDUCATION/TRAINING PROGRAM

## 2025-05-20 PROCEDURE — 99215 OFFICE O/P EST HI 40 MIN: CPT | Performed by: STUDENT IN AN ORGANIZED HEALTH CARE EDUCATION/TRAINING PROGRAM

## 2025-05-20 PROCEDURE — 36415 COLL VENOUS BLD VENIPUNCTURE: CPT | Performed by: STUDENT IN AN ORGANIZED HEALTH CARE EDUCATION/TRAINING PROGRAM

## 2025-05-20 PROCEDURE — 3074F SYST BP LT 130 MM HG: CPT | Performed by: STUDENT IN AN ORGANIZED HEALTH CARE EDUCATION/TRAINING PROGRAM

## 2025-05-20 RX ORDER — LOSARTAN POTASSIUM 100 MG/1
100 TABLET ORAL DAILY
Qty: 30 TABLET | Refills: 2 | Status: SHIPPED | OUTPATIENT
Start: 2025-05-20

## 2025-05-20 RX ORDER — PREGABALIN 50 MG/1
50 CAPSULE ORAL 2 TIMES DAILY
Qty: 60 CAPSULE | Refills: 1 | Status: SHIPPED | OUTPATIENT
Start: 2025-05-20 | End: 2025-06-19

## 2025-05-20 RX ORDER — ONDANSETRON 4 MG/1
4 TABLET, ORALLY DISINTEGRATING ORAL 3 TIMES DAILY PRN
Qty: 21 TABLET | Refills: 0 | Status: SHIPPED | OUTPATIENT
Start: 2025-05-20

## 2025-05-20 ASSESSMENT — PATIENT HEALTH QUESTIONNAIRE - PHQ9
9. THOUGHTS THAT YOU WOULD BE BETTER OFF DEAD, OR OF HURTING YOURSELF: NOT AT ALL
SUM OF ALL RESPONSES TO PHQ QUESTIONS 1-9: 0
4. FEELING TIRED OR HAVING LITTLE ENERGY: NOT AT ALL
3. TROUBLE FALLING OR STAYING ASLEEP: NOT AT ALL
10. IF YOU CHECKED OFF ANY PROBLEMS, HOW DIFFICULT HAVE THESE PROBLEMS MADE IT FOR YOU TO DO YOUR WORK, TAKE CARE OF THINGS AT HOME, OR GET ALONG WITH OTHER PEOPLE: NOT DIFFICULT AT ALL
1. LITTLE INTEREST OR PLEASURE IN DOING THINGS: NOT AT ALL
7. TROUBLE CONCENTRATING ON THINGS, SUCH AS READING THE NEWSPAPER OR WATCHING TELEVISION: NOT AT ALL
SUM OF ALL RESPONSES TO PHQ QUESTIONS 1-9: 0
SUM OF ALL RESPONSES TO PHQ QUESTIONS 1-9: 0
5. POOR APPETITE OR OVEREATING: NOT AT ALL
6. FEELING BAD ABOUT YOURSELF - OR THAT YOU ARE A FAILURE OR HAVE LET YOURSELF OR YOUR FAMILY DOWN: NOT AT ALL
SUM OF ALL RESPONSES TO PHQ QUESTIONS 1-9: 0
2. FEELING DOWN, DEPRESSED OR HOPELESS: NOT AT ALL
8. MOVING OR SPEAKING SO SLOWLY THAT OTHER PEOPLE COULD HAVE NOTICED. OR THE OPPOSITE, BEING SO FIGETY OR RESTLESS THAT YOU HAVE BEEN MOVING AROUND A LOT MORE THAN USUAL: NOT AT ALL

## 2025-05-20 NOTE — PROGRESS NOTES
Pt said he had to walk out and come back later to have labs and EKG done he did want to stay even after I encouraged him to stay so he can be evaluated

## 2025-05-20 NOTE — PROGRESS NOTES
Have you been to the ER, urgent care clinic since your last visit?  Hospitalized since your last visit?   NO    Have you seen or consulted any other health care providers outside our system since your last visit?   YES - When: approximately 5 days ago.  Where and Why: DR Donnelly.

## 2025-05-20 NOTE — TELEPHONE ENCOUNTER
Can you please call patient, it looks like his blood pressure dropped when he went from laying down to standing up.  Can we confirm what blood pressure medicines he is actually taking.  The patient was unsure if his list was correct when he was here.  Has he  been taking his clonidine?

## 2025-05-21 NOTE — TELEPHONE ENCOUNTER
Estelle spoke with the patient and told him he should have been seen and had EKG instead of walking out he said he felt like the doctor did not listen to him and he would decide what he wants to do

## 2025-05-27 ENCOUNTER — OFFICE VISIT (OUTPATIENT)
Age: 60
End: 2025-05-27
Payer: MEDICAID

## 2025-05-27 VITALS
HEART RATE: 68 BPM | RESPIRATION RATE: 20 BRPM | WEIGHT: 187 LBS | HEIGHT: 74 IN | OXYGEN SATURATION: 95 % | TEMPERATURE: 98.8 F | BODY MASS INDEX: 24 KG/M2 | DIASTOLIC BLOOD PRESSURE: 78 MMHG | SYSTOLIC BLOOD PRESSURE: 121 MMHG

## 2025-05-27 DIAGNOSIS — Z86.0101 PERSONAL HISTORY OF ADENOMATOUS AND SERRATED COLON POLYPS: Primary | ICD-10-CM

## 2025-05-27 PROCEDURE — 3074F SYST BP LT 130 MM HG: CPT | Performed by: SURGERY

## 2025-05-27 PROCEDURE — 3078F DIAST BP <80 MM HG: CPT | Performed by: SURGERY

## 2025-05-27 PROCEDURE — 99213 OFFICE O/P EST LOW 20 MIN: CPT | Performed by: SURGERY

## 2025-05-27 ASSESSMENT — PATIENT HEALTH QUESTIONNAIRE - PHQ9
SUM OF ALL RESPONSES TO PHQ QUESTIONS 1-9: 0
2. FEELING DOWN, DEPRESSED OR HOPELESS: NOT AT ALL
SUM OF ALL RESPONSES TO PHQ QUESTIONS 1-9: 0
SUM OF ALL RESPONSES TO PHQ QUESTIONS 1-9: 0
1. LITTLE INTEREST OR PLEASURE IN DOING THINGS: NOT AT ALL
SUM OF ALL RESPONSES TO PHQ QUESTIONS 1-9: 0

## 2025-05-27 NOTE — PROGRESS NOTES
Joon Josue Jr. is a 59 y.o. male who presents today with the following:  Chief Complaint   Patient presents with    Post-Op Check     Colonoscopy result       HPI    Mr. Josue presents in follow-up for his colonoscopy that was performed back on May 8.  He states that he is still had occasional right lower quadrant pain that is doubled him over.  He is also had syncopal episodes for which he is being evaluated with a loop recorder via [facility] [place].  A single diminutive tubular adenoma was found in the transverse colon and internal hemorrhoids.  No significant abnormalities were identified in the cecum or ascending colon and no etiology of his pain was identified.  These findings were shared with him today.    Past Medical History:   Diagnosis Date    Acute MI, anterior wall (HCC) 01/09/2016    VF arrest/lytics/[facility]/cath/no PCI    Asthma     Cervical radiculopathy     Chronic obstructive pulmonary disease (HCC)     Chronic pain     Coronary heart disease     Diverticulosis     GERD (gastroesophageal reflux disease)     Hypertension     Pneumonia     PTSD (post-traumatic stress disorder)     Shingles     Tubular adenoma        Past Surgical History:   Procedure Laterality Date    COLONOSCOPY N/A 5/8/2025    COLONOSCOPY WITH COLD FORCEP POLYPECTOMY performed by Fortino Donnelly MD at [facility] MAIN OR    JOINT REPLACEMENT Right     Total Knee Replacement    KNEE ARTHROSCOPY Right     ORTHODONTIC TREATMENT Left     elbow trauma    ORTHOPEDIC SURGERY      removed a mass from right palm oct 13 2017right wrist fxr repair 2016 dec    ORTHOPEDIC SURGERY Right     hand trigger contractures trauma    ROTATOR CUFF REPAIR Right        Social History     Socioeconomic History    Marital status: Single     Spouse name: Not on file    Number of children: Not on file    Years of education: Not on file    Highest education level: Not on file   Occupational History    Occupation: retired     Comment: disabled   Tobacco Use    Smoking

## 2025-05-27 NOTE — ASSESSMENT & PLAN NOTE
Right lower quadrant pain of undetermined etiology.  No source for his pain was identified at time of colonoscopy.  Incidental finding of a diminutive tubular adenoma which shared with him for which I recommend a repeat colonoscopy in 5 years.    Consideration should be given to other potential sources such as neuropathic pain as potential etiology.

## 2025-05-27 NOTE — PROGRESS NOTES
Identified pt with two pt identifiers (name and ). Reviewed chart in preparation for visit and have obtained necessary documentation.    Joon Josue Jr. is a 59 y.o. male Post-Op Check (Colonoscopy result)  .    Vitals:    25 1417   BP: 121/78   BP Site: Right Upper Arm   Patient Position: Sitting   BP Cuff Size: Medium Adult   Pulse: 68   Resp: 20   Temp: 98.8 °F (37.1 °C)   TempSrc: Oral   SpO2: 95%   Weight: 84.8 kg (187 lb)   Height: 1.88 m (6' 2\")          1. Have you been to the ER, urgent care clinic since your last visit?  Hospitalized since your last visit?  no     2. Have you seen or consulted any other health care providers outside of the [facility] since your last visit?  Include any pap smears or colon screening.  no

## 2025-06-12 RX ORDER — TRAZODONE HYDROCHLORIDE 150 MG/1
150 TABLET ORAL NIGHTLY
Qty: 30 TABLET | Refills: 5 | Status: SHIPPED | OUTPATIENT
Start: 2025-06-12

## 2025-06-12 NOTE — TELEPHONE ENCOUNTER
Medication Refill Request    Vargas Josue Jr. is requesting a refill of the following medication(s):   Trazodone  Please send refill to:     [facility]/pharmacy #[id number] - [place], [place] - [address] [facility] [phone] - F [phone]  [address] [facility] [address]  Phone: [phone] Fax: [phone]

## 2025-08-11 RX ORDER — LOSARTAN POTASSIUM 100 MG/1
100 TABLET ORAL DAILY
Qty: 30 TABLET | Refills: 3 | Status: SHIPPED | OUTPATIENT
Start: 2025-08-11

## 2025-09-02 ENCOUNTER — TELEPHONE (OUTPATIENT)
Age: 60
End: 2025-09-02

## 2025-09-03 RX ORDER — TRAZODONE HYDROCHLORIDE 150 MG/1
150 TABLET ORAL NIGHTLY
Qty: 30 TABLET | Refills: 5 | Status: SHIPPED | OUTPATIENT
Start: 2025-09-03

## (undated) DEVICE — SKIN MARKER,REGULAR TIP WITH RULER: Brand: DEVON

## (undated) DEVICE — INFECTION CONTROL KIT SYS

## (undated) DEVICE — SUTURE VCRL SZ 2-0 L36IN ABSRB VLT L36MM CT-1 1/2 CIR J345H

## (undated) DEVICE — STERILE POLYISOPRENE POWDER-FREE SURGICAL GLOVES: Brand: PROTEXIS

## (undated) DEVICE — SOLUTION IRRIG 1000ML STRL H2O USP PLAS POUR BTL

## (undated) DEVICE — DRAPE SHT 3 QTR PROXIMA 53X77 --

## (undated) DEVICE — STOCKINETTE,IMPERVIOUS,12X48,STERILE: Brand: MEDLINE

## (undated) DEVICE — FORCEPS BX L240CM JAW DIA2.2MM RAD JAW 4 HOT DISP

## (undated) DEVICE — HANDPIECE SET WITH HIGH FLOW TIP AND SUCTION TUBE: Brand: INTERPULSE

## (undated) DEVICE — SOLUTION IRRIG 1000ML 0.9% SOD CHL USP POUR PLAS BTL

## (undated) DEVICE — 1230 DOUBLE MAGNET NEEDLE COUNTER,BLACK: Brand: DEVON

## (undated) DEVICE — ZIMMER® STERILE DISPOSABLE TOURNIQUET CUFF WITH PROTECTIVE SLEEVE AND PLC, DUAL PORT, SINGLE BLADDER, 34 IN. (86 CM)

## (undated) DEVICE — BANDAGE,GAUZE,CONFORMING,6"X80",STRL,LF: Brand: MEDLINE

## (undated) DEVICE — BANDAGE COMPR W6INXL5YD NONSTERILE TAN BRTH SELF ADH WRP W/

## (undated) DEVICE — SOLUTION IRRIG 3000ML 0.9% SOD CHL ARTHROMATIC PLAS CONT

## (undated) DEVICE — WRAP COMPR KNEE PREM W GEL BG

## (undated) DEVICE — APPLICATOR SCRB 26ML TEAL STRL -- CHLORAPREP 26ML

## (undated) DEVICE — DRAPE,EXTREMITY,89X128,STERILE: Brand: MEDLINE

## (undated) DEVICE — BOWL BNE CEM MIX SPAT CURET SMARTMIX CTS

## (undated) DEVICE — TIP CLEANER: Brand: VALLEYLAB

## (undated) DEVICE — SYRINGE 20ML LL S/C 50

## (undated) DEVICE — PENCIL ES L3M BTTN SWCH S STL HEX LOK BLDE ELECTRD HOLSTER

## (undated) DEVICE — DRSG AQUACEL SURG 3.5 X 10IN -- CONVERT TO ITEM 370183

## (undated) DEVICE — COVER,TABLE,44X76,STERILE: Brand: MEDLINE

## (undated) DEVICE — CONCISE CEMENT SCULPS KIT: Brand: CONCISE

## (undated) DEVICE — HOOD: Brand: FLYTE

## (undated) DEVICE — PADDING CAST W6INXL4YD RAYON UNDERCAST SOF-ROL

## (undated) DEVICE — TUBING, SUCTION, 1/4" X 10', STRAIGHT: Brand: MEDLINE

## (undated) DEVICE — SAW BLADE

## (undated) DEVICE — INTENDED FOR TISSUE SEPARATION, AND OTHER PROCEDURES THAT REQUIRE A SHARP SURGICAL BLADE TO PUNCTURE OR CUT.: Brand: BARD-PARKER SAFETY BLADES SIZE 10, STERILE

## (undated) DEVICE — BLANKET WRM AD PREM MISTRAL-AIR

## (undated) DEVICE — SPONGE: LAP 18X18 W  200/CS: Brand: MEDICAL ACTION INDUSTRIES

## (undated) DEVICE — PAD,ABDOMINAL,8"X10",ST,LF: Brand: MEDLINE

## (undated) DEVICE — GOWN,ISO,KNITCUFF, PREMIUM BLUE, LV3,XL: Brand: MEDLINE INDUSTRIES, INC.

## (undated) DEVICE — BLUNT CANNULA: Brand: MONOJECT

## (undated) DEVICE — SOLUTION IRRIG 1000ML H2O PIC PLAS SHATTERPROOF CONTAINER

## (undated) DEVICE — LINER,SEMI-RIGID,3000CC,50EA/CS: Brand: MEDLINE

## (undated) DEVICE — 3M™ STERI-DRAPE™ U-DRAPE 1015: Brand: STERI-DRAPE™

## (undated) DEVICE — SUTURE VCRL 0 L27IN ABSRB VLT CT L40MM 1/2 CIR TAPERPOINT J352H

## (undated) DEVICE — AMD ANTIMICROBIAL BANDAGE ROLL,6 PLY: Brand: KERLIX

## (undated) DEVICE — STAPLER SKIN H3.9MM WIRE DIA0.58MM CRWN 6.9MM 35 STPL ROT

## (undated) DEVICE — SYR LR LCK 1ML GRAD NSAF 30ML --

## (undated) DEVICE — KIT ENDOSCOPIC  PROC VIA

## (undated) DEVICE — 3M™ IOBAN™ 2 ANTIMICROBIAL INCISE DRAPE 6651EZ: Brand: IOBAN™ 2

## (undated) DEVICE — REM POLYHESIVE ADULT PATIENT RETURN ELECTRODE: Brand: VALLEYLAB

## (undated) DEVICE — GOWN,REINFORCED,POLY,AURORA,XLARGE,STRL: Brand: MEDLINE

## (undated) DEVICE — PACK,SET UP,NO DRAPES: Brand: MEDLINE

## (undated) DEVICE — YANKAUER,TAPERED BULBOUS TIP,W/O VENT: Brand: MEDLINE